# Patient Record
Sex: MALE | Race: WHITE | NOT HISPANIC OR LATINO | Employment: OTHER | URBAN - NONMETROPOLITAN AREA
[De-identification: names, ages, dates, MRNs, and addresses within clinical notes are randomized per-mention and may not be internally consistent; named-entity substitution may affect disease eponyms.]

---

## 2018-07-03 ENCOUNTER — HOSPITAL ENCOUNTER (OUTPATIENT)
Dept: ULTRASOUND IMAGING | Facility: HOSPITAL | Age: 83
Discharge: HOME | End: 2018-07-03
Admitting: NURSE PRACTITIONER

## 2018-07-03 ENCOUNTER — TRANSCRIBE ORDERS (OUTPATIENT)
Dept: GENERAL RADIOLOGY | Facility: HOSPITAL | Age: 83
End: 2018-07-03

## 2018-07-03 DIAGNOSIS — R10.11 RIGHT UPPER QUADRANT PAIN: Primary | ICD-10-CM

## 2018-07-03 DIAGNOSIS — R10.11 RIGHT UPPER QUADRANT PAIN: ICD-10-CM

## 2018-07-03 PROCEDURE — 76705 ECHO EXAM OF ABDOMEN: CPT

## 2018-07-10 ENCOUNTER — TRANSCRIBE ORDERS (OUTPATIENT)
Dept: ADMINISTRATIVE | Facility: HOSPITAL | Age: 83
End: 2018-07-10

## 2018-07-10 ENCOUNTER — TRANSCRIBE ORDERS (OUTPATIENT)
Dept: LAB | Facility: HOSPITAL | Age: 83
End: 2018-07-10

## 2018-07-10 ENCOUNTER — LAB (OUTPATIENT)
Dept: LAB | Facility: HOSPITAL | Age: 83
End: 2018-07-10

## 2018-07-10 DIAGNOSIS — R10.13 EPIGASTRIC PAIN: Primary | ICD-10-CM

## 2018-07-10 DIAGNOSIS — R10.13 EPIGASTRIC PAIN: ICD-10-CM

## 2018-07-10 DIAGNOSIS — R10.13 ABDOMINAL PAIN, EPIGASTRIC: Primary | ICD-10-CM

## 2018-07-10 LAB
ALBUMIN SERPL-MCNC: 4.2 G/DL (ref 3.5–5)
ALBUMIN/GLOB SERPL: 1.2 G/DL (ref 1.1–2.5)
ALP SERPL-CCNC: 37 U/L (ref 24–120)
ALT SERPL W P-5'-P-CCNC: 30 U/L (ref 0–54)
AMYLASE SERPL-CCNC: 85 U/L (ref 30–110)
ANION GAP SERPL CALCULATED.3IONS-SCNC: 10 MMOL/L (ref 4–13)
AST SERPL-CCNC: 34 U/L (ref 7–45)
AUTO MIXED CELLS #: 1.6 10*3/MM3 (ref 0.1–2.6)
AUTO MIXED CELLS %: 16.1 % (ref 0.1–24)
BILIRUB SERPL-MCNC: 0.6 MG/DL (ref 0.1–1)
BILIRUB UR QL STRIP: NEGATIVE
BUN BLD-MCNC: 20 MG/DL (ref 5–21)
BUN/CREAT SERPL: 15
CALCIUM SPEC-SCNC: 9.4 MG/DL (ref 8.4–10.4)
CHLORIDE SERPL-SCNC: 107 MMOL/L (ref 98–110)
CLARITY UR: CLEAR
CO2 SERPL-SCNC: 30 MMOL/L (ref 24–31)
COLOR UR: YELLOW
CREAT BLD-MCNC: 1.33 MG/DL (ref 0.5–1.4)
ERYTHROCYTE [DISTWIDTH] IN BLOOD BY AUTOMATED COUNT: 13.2 % (ref 12–15)
GFR SERPL CREATININE-BSD FRML MDRD: 51 ML/MIN/1.73
GLOBULIN UR ELPH-MCNC: 3.6 GM/DL
GLUCOSE BLD-MCNC: 112 MG/DL (ref 70–100)
GLUCOSE UR STRIP-MCNC: NEGATIVE MG/DL
HCT VFR BLD AUTO: 44.6 % (ref 40–52)
HGB BLD-MCNC: 14.7 G/DL (ref 14–18)
HGB UR QL STRIP.AUTO: NEGATIVE
KETONES UR QL STRIP: NEGATIVE
LEUKOCYTE ESTERASE UR QL STRIP.AUTO: NEGATIVE
LIPASE SERPL-CCNC: 87 U/L (ref 23–203)
LYMPHOCYTES # BLD AUTO: 1.9 10*3/MM3 (ref 0.8–7)
LYMPHOCYTES NFR BLD AUTO: 19 % (ref 15–45)
MCH RBC QN AUTO: 29.5 PG (ref 28–32)
MCHC RBC AUTO-ENTMCNC: 33 G/DL (ref 33–36)
MCV RBC AUTO: 89.6 FL (ref 82–95)
NEUTROPHILS # BLD AUTO: 6.3 10*3/MM3 (ref 1.5–8.3)
NEUTROPHILS NFR BLD AUTO: 64.9 % (ref 39–78)
NITRITE UR QL STRIP: NEGATIVE
PH UR STRIP.AUTO: 6 [PH] (ref 5–8)
PLATELET # BLD AUTO: 337 10*3/MM3 (ref 130–400)
PMV BLD AUTO: 9.1 FL (ref 6–12)
POTASSIUM BLD-SCNC: 5.2 MMOL/L (ref 3.5–5.3)
PROT SERPL-MCNC: 7.8 G/DL (ref 6.3–8.7)
PROT UR QL STRIP: NEGATIVE
RBC # BLD AUTO: 4.98 10*6/MM3 (ref 4.2–5.4)
SODIUM BLD-SCNC: 147 MMOL/L (ref 135–145)
SP GR UR STRIP: 1.01 (ref 1–1.03)
UROBILINOGEN UR QL STRIP: NORMAL
WBC NRBC COR # BLD: 9.8 10*3/MM3 (ref 4.8–10.8)

## 2018-07-10 PROCEDURE — 83690 ASSAY OF LIPASE: CPT

## 2018-07-10 PROCEDURE — 81003 URINALYSIS AUTO W/O SCOPE: CPT

## 2018-07-10 PROCEDURE — 82150 ASSAY OF AMYLASE: CPT

## 2018-07-10 PROCEDURE — 85025 COMPLETE CBC W/AUTO DIFF WBC: CPT

## 2018-07-10 PROCEDURE — 80053 COMPREHEN METABOLIC PANEL: CPT

## 2018-07-10 PROCEDURE — 36415 COLL VENOUS BLD VENIPUNCTURE: CPT

## 2018-07-11 ENCOUNTER — HOSPITAL ENCOUNTER (OUTPATIENT)
Dept: NUCLEAR MEDICINE | Facility: HOSPITAL | Age: 83
Discharge: HOME | End: 2018-07-11

## 2018-07-11 DIAGNOSIS — R10.13 ABDOMINAL PAIN, EPIGASTRIC: ICD-10-CM

## 2018-07-11 PROCEDURE — 78226 HEPATOBILIARY SYSTEM IMAGING: CPT

## 2018-07-11 PROCEDURE — A9537 TC99M MEBROFENIN: HCPCS | Performed by: NURSE PRACTITIONER

## 2018-07-11 PROCEDURE — 0 TECHNETIUM TC 99M MEBROFENIN KIT: Performed by: NURSE PRACTITIONER

## 2018-07-11 RX ORDER — KIT FOR THE PREPARATION OF TECHNETIUM TC 99M MEBROFENIN 45 MG/10ML
1 INJECTION, POWDER, LYOPHILIZED, FOR SOLUTION INTRAVENOUS
Status: COMPLETED | OUTPATIENT
Start: 2018-07-11 | End: 2018-07-11

## 2018-07-11 RX ADMIN — MEBROFENIN 1 DOSE: 45 INJECTION, POWDER, LYOPHILIZED, FOR SOLUTION INTRAVENOUS at 15:30

## 2018-07-25 ENCOUNTER — TRANSCRIBE ORDERS (OUTPATIENT)
Dept: ADMINISTRATIVE | Facility: HOSPITAL | Age: 83
End: 2018-07-25

## 2018-07-25 ENCOUNTER — HOSPITAL ENCOUNTER (OUTPATIENT)
Dept: CT IMAGING | Facility: HOSPITAL | Age: 83
Discharge: HOME | End: 2018-07-25
Admitting: NURSE PRACTITIONER

## 2018-07-25 DIAGNOSIS — R10.10 UPPER ABDOMINAL PAIN: Primary | ICD-10-CM

## 2018-07-25 DIAGNOSIS — R10.10 UPPER ABDOMINAL PAIN: ICD-10-CM

## 2018-07-25 PROCEDURE — 74176 CT ABD & PELVIS W/O CONTRAST: CPT

## 2018-07-27 ENCOUNTER — TRANSCRIBE ORDERS (OUTPATIENT)
Dept: ADMINISTRATIVE | Facility: HOSPITAL | Age: 83
End: 2018-07-27

## 2018-07-27 DIAGNOSIS — I70.0 ATHEROSCLEROSIS OF AORTA (HCC): Primary | ICD-10-CM

## 2018-07-28 ENCOUNTER — APPOINTMENT (OUTPATIENT)
Dept: GENERAL RADIOLOGY | Facility: HOSPITAL | Age: 83
End: 2018-07-28

## 2018-07-30 ENCOUNTER — HOSPITAL ENCOUNTER (OUTPATIENT)
Dept: CT IMAGING | Facility: HOSPITAL | Age: 83
Discharge: HOME | End: 2018-07-30
Admitting: NURSE PRACTITIONER

## 2018-07-30 DIAGNOSIS — I70.0 ATHEROSCLEROSIS OF AORTA (HCC): ICD-10-CM

## 2018-07-30 LAB — CREAT BLDA-MCNC: 1.2 MG/DL (ref 0.6–1.3)

## 2018-07-30 PROCEDURE — 0 IOPAMIDOL PER 1 ML: Performed by: NURSE PRACTITIONER

## 2018-07-30 PROCEDURE — 82565 ASSAY OF CREATININE: CPT

## 2018-07-30 PROCEDURE — 74174 CTA ABD&PLVS W/CONTRAST: CPT

## 2018-07-30 RX ADMIN — IOPAMIDOL 150 ML: 755 INJECTION, SOLUTION INTRAVENOUS at 08:59

## 2018-07-31 ENCOUNTER — TRANSCRIBE ORDERS (OUTPATIENT)
Dept: ADMINISTRATIVE | Facility: HOSPITAL | Age: 83
End: 2018-07-31

## 2018-07-31 DIAGNOSIS — Z01.818 PREOPERATIVE CLEARANCE: ICD-10-CM

## 2018-07-31 DIAGNOSIS — R07.9 CHEST PAIN, UNSPECIFIED TYPE: ICD-10-CM

## 2018-07-31 DIAGNOSIS — I70.1 ATHEROSCLEROSIS OF RENAL ARTERY (HCC): Primary | ICD-10-CM

## 2018-07-31 DIAGNOSIS — R10.9 GASTRIC PAIN: ICD-10-CM

## 2018-08-09 ENCOUNTER — HOSPITAL ENCOUNTER (OUTPATIENT)
Dept: CARDIOLOGY | Facility: HOSPITAL | Age: 83
Discharge: HOME | End: 2018-08-09
Attending: SPECIALIST | Admitting: SPECIALIST

## 2018-08-09 ENCOUNTER — HOSPITAL ENCOUNTER (OUTPATIENT)
Dept: ULTRASOUND IMAGING | Facility: HOSPITAL | Age: 83
Discharge: HOME | End: 2018-08-09
Attending: SPECIALIST

## 2018-08-09 VITALS
SYSTOLIC BLOOD PRESSURE: 141 MMHG | WEIGHT: 175 LBS | DIASTOLIC BLOOD PRESSURE: 99 MMHG | BODY MASS INDEX: 26.52 KG/M2 | HEIGHT: 68 IN | HEART RATE: 69 BPM

## 2018-08-09 DIAGNOSIS — Z01.818 PREOPERATIVE CLEARANCE: ICD-10-CM

## 2018-08-09 DIAGNOSIS — R10.9 GASTRIC PAIN: ICD-10-CM

## 2018-08-09 DIAGNOSIS — R07.9 CHEST PAIN, UNSPECIFIED TYPE: ICD-10-CM

## 2018-08-09 DIAGNOSIS — I70.1 ATHEROSCLEROSIS OF RENAL ARTERY (HCC): ICD-10-CM

## 2018-08-09 PROCEDURE — 93975 VASCULAR STUDY: CPT

## 2018-08-09 PROCEDURE — 76775 US EXAM ABDO BACK WALL LIM: CPT

## 2018-08-09 PROCEDURE — 93018 CV STRESS TEST I&R ONLY: CPT | Performed by: INTERNAL MEDICINE

## 2018-08-09 PROCEDURE — 93017 CV STRESS TEST TRACING ONLY: CPT

## 2018-08-10 LAB
BH CV STRESS BP STAGE 1: NORMAL
BH CV STRESS BP STAGE 2: NORMAL
BH CV STRESS BP STAGE 3: NORMAL
BH CV STRESS DURATION MIN STAGE 1: 3
BH CV STRESS DURATION MIN STAGE 2: 3
BH CV STRESS DURATION MIN STAGE 3: 3
BH CV STRESS DURATION SEC STAGE 1: 0
BH CV STRESS DURATION SEC STAGE 2: 0
BH CV STRESS DURATION SEC STAGE 3: 0
BH CV STRESS GRADE STAGE 1: 0
BH CV STRESS GRADE STAGE 2: 5
BH CV STRESS GRADE STAGE 3: 10
BH CV STRESS HR STAGE 1: 89
BH CV STRESS HR STAGE 2: 100
BH CV STRESS HR STAGE 3: 114
BH CV STRESS METS STAGE 1: 2.3
BH CV STRESS METS STAGE 2: 3.5
BH CV STRESS METS STAGE 3: 4.6
BH CV STRESS PROTOCOL 1: NORMAL
BH CV STRESS RECOVERY BP: NORMAL MMHG
BH CV STRESS RECOVERY HR: 80 BPM
BH CV STRESS SPEED STAGE 1: 1.7
BH CV STRESS SPEED STAGE 2: 1.7
BH CV STRESS SPEED STAGE 3: 1.7
BH CV STRESS STAGE 1: 1
BH CV STRESS STAGE 2: 2
BH CV STRESS STAGE 3: 3
MAXIMAL PREDICTED HEART RATE: 135 BPM
PERCENT MAX PREDICTED HR: 84.44 %
STRESS BASELINE BP: NORMAL MMHG
STRESS BASELINE HR: 85 BPM
STRESS PERCENT HR: 99 %
STRESS POST ESTIMATED WORKLOAD: 4.6 METS
STRESS POST EXERCISE DUR MIN: 9 MIN
STRESS POST EXERCISE DUR SEC: 0 SEC
STRESS POST PEAK BP: NORMAL MMHG
STRESS POST PEAK HR: 114 BPM
STRESS TARGET HR: 115 BPM

## 2018-08-21 NOTE — PROGRESS NOTES
2018      Jose David Pimentel MD  [address] [facility] [place] [address]    Kj Cerrato  1932    Chief Complaint   Patient presents with   • Peripheral Vascular Disease     New referral.History of renal artery stenosis and carotid disease.Denies new stroke symptoms.Residual speech problem from old CVA.Has not followed up with vascular surgeon in recent years.       Dear Jose David Pimentel MD:      HPI  I had the pleasure of seeing your patient Kj Cerrato in the office today.  Thank you kindly for this consultation.  As you recall, Kj Cerrato is a 85 y.o.  male who you are currently following for routine health maintenance.  He is having complaints of epigastric pain that is only relieved when he lays flat.  He did have a CTA of the abdomen and pelvis which I did review in office.  He was referred here for aortic calcifications and stenosis.   He states the pain has resolved.  He denies any uncontrolled high blood pressure.  He denies any claudication to his lower extremities.  He does have a history of stroke that has left him with speech difficulty.  He had a right carotid endarterectomy in .  He did have noninvasive testing which I did review in office.     Past Medical History:   Diagnosis Date   • Atherosclerosis    • BPH (benign prostatic hyperplasia)    • Carotid artery disease (CMS/HCC)    • High cholesterol    • Hypertension    • Kidney cysts    • Kidney stone    • Renal artery stenosis (CMS/HCC)    • Renal artery stenosis (CMS/HCC) 2018   • Renal insufficiency    • Solitary lung nodule    • Stroke (CMS/HCC)     Residual speech problems.       Past Surgical History:   Procedure Laterality Date   • CAROTID ENDARTERECTOMY Right     Per Dr Idris Dotson   • PROSTATE SURGERY         Family History   Problem Relation Age of Onset   • Other Mother          after cranial surgery   • Cancer Father        Social History     Social History   • Marital status:      Spouse name: N/A   •  "Number of children: N/A   • Years of education: N/A     Occupational History   • Not on file.     Social History Main Topics   • Smoking status: Former Smoker     Quit date: 1970   • Smokeless tobacco: Never Used   • Alcohol use No   • Drug use: No   • Sexual activity: Defer     Other Topics Concern   • Not on file     Social History Narrative   • No narrative on file       No Known Allergies    Prior to Admission medications    Medication Sig Start Date End Date Taking? Authorizing Provider   aspirin (ASPIR) 81 MG EC tablet Aspir-81    Mayra Gonzalez MD   atenolol (TENORMIN) 50 MG tablet atenolol 50 mg tablet   Take 1 tablet every day by oral route.    Mayra Gonzalez MD   clopidogrel (PLAVIX) 75 MG tablet clopidogrel 75 mg tablet   Take 1 tablet every day by oral route.    Mayra Gonzalez MD   finasteride (PROSCAR) 5 MG tablet finasteride 5 mg tablet   Take 1 tablet every day by oral route.    Mayra Gonzalez MD   pantoprazole (PROTONIX) 40 MG EC tablet Protonix 40 mg tablet,delayed release   Take 1 tablet every day by oral route at bedtime.    Mayra Gonzalez MD   pravastatin (PRAVACHOL) 40 MG tablet pravastatin 40 mg tablet   Take 1 tablet every day by oral route.    Mayra Gonzalez MD       Review of Systems   Constitutional: Negative.    HENT: Negative.    Eyes: Negative.    Respiratory: Negative.    Cardiovascular: Negative.    Gastrointestinal: Negative.    Endocrine: Negative.    Genitourinary: Negative.    Musculoskeletal: Negative.    Skin: Negative.    Allergic/Immunologic: Negative.    Neurological: Negative.    Hematological: Negative.    Psychiatric/Behavioral: Negative.    All other systems reviewed and are negative.      /82   Pulse 61   Ht 172.7 cm (68\")   Wt 80.7 kg (178 lb)   BMI 27.06 kg/m²   Physical Exam   Constitutional: He is oriented to person, place, and time. He appears well-developed and well-nourished.   HENT:   Head: Normocephalic and " atraumatic.   Eyes: Pupils are equal, round, and reactive to light. No scleral icterus.   Neck: Normal range of motion. Neck supple. No thyromegaly present.   Cardiovascular: Normal rate, regular rhythm, normal heart sounds and intact distal pulses.    Pulmonary/Chest: Effort normal and breath sounds normal.   Abdominal: Soft. Bowel sounds are normal.   Musculoskeletal: Normal range of motion.   Neurological: He is alert and oriented to person, place, and time.   Skin: Skin is warm and dry.   Psychiatric: He has a normal mood and affect. His behavior is normal. Judgment and thought content normal.   Nursing note and vitals reviewed.      Ct Abdomen Pelvis Without Contrast    Result Date: 7/25/2018  Narrative: EXAMINATION:  CT ABDOMEN WO CONTRAST-  7/25/2018 9:53 AM CDT  HISTORY: R10.10-Upper abdominal pain, unspecified  TECHNIQUE: Spiral CT was performed of the abdomen without contrast. Multiplanar images were reconstructed.  DLP: 427 mGy-cm. Automated dosage control was utilized.  COMPARISON: No comparison study.  LUNG BASES: Minimal dependent atelectasis. 3-4 mm subpleural left lower lobe nodule on images 5 and 6.  LIVER AND SPLEEN: Normal unenhanced appearance of the liver. Normal unenhanced appearance of the spleen. No dense gallstones.  PANCREAS: Normal unenhanced appearance of the pancreas.  KIDNEYS AND ADRENALS: The adrenal glands are normal. There are multiple corticomedullary junction renal stones bilaterally. There is a 5 mm hyperdense area in the midpole region of the left kidney anteriorly. This could be a hyperdense cyst. There is a 9 mm low-density lesion in the mid to lower pole left kidney posteriorly that is probably a small cyst. These lesions are not fully characterized without contrast. The renal collecting systems and visualized ureters are nondilated.  BOWEL: Moderate stool in the visualized colon. Visualized small bowel loops are normal caliber. The stomach is unremarkable.  OTHER: There is  dense atheromatous disease of the aortoiliac vessels. There is dense calcification at the origin of both renal arteries. Renal artery stenosis is not excluded. There is also plaque at the origins of the mesenteric vessels.      Impression: 1. There is a 3-4 mm subpleural left lower lobe nodule and images 5 and 6 of series 3. This is likely a small granuloma. Small neoplasm is felt to be much less likely. 2. Multiple corticomedullary junction renal stones bilaterally. Currently no evidence of any obstructive uropathy. 3. There is a 5 mm hyperdense lesion in the midpole left kidney anteriorly. This is probably a hyperdense cyst. There is a 9 mm low-density lesion in the mid to lower pole left kidney posteriorly, likely a small cyst. These lesions are not fully characterized without contrast. 4. Dense atheromatous disease of the aortoiliac vessels with dense calcification at the origins of both renal arteries. Renal artery stenosis cannot be ruled out. There is lesser calcified plaque at the origins of the mesenteric vessels. This report was finalized on 07/25/2018 10:17 by Dr. Sid Francois MD.    Ct Angiogram Abdomen Pelvis With & Without Contrast    Result Date: 7/30/2018  Narrative: EXAM: CT ANGIOGRAM ABDOMEN PELVIS W WO CONTRAST- - 7/30/2018 8:16 AM CDT  HISTORY: ATHEROSCLEROSIS OF AORTA; I70.0-Atherosclerosis of aorta   COMPARISON: 7/25/2018.  DOSE LENGTH PRODUCT: 229 mGy cm. Automated exposure control was also utilized to decrease patient radiation dose.  TECHNIQUE: Enhanced  axial images of the abdomen  obtained after administration of 150 ml of Isovue-370. No reported adverse events. Coronal and sagittal reformats available. 3D postprocessing, including MIPs, performed and images saved to PACS.  FINDINGS: VISUALIZED CHEST: No pleural or pericardial effusion. The previously described left lower lobe 5 mm pulmonary nodule appears hyperdense on this exam, likely calcified granuloma.  Phase of contrast limits  evaluation of solid organs. LIVER: No arterially enhancing liver lesion.  BILIARY: No calcified gallstone. No intrahepatic or extrahepatic bile duct dilation.  PANCREAS: Normal pancreas contour and enhancement.  SPLEEN: Normal size and contour.  ADRENAL: Normal appearance of the bilateral adrenal glands.  GENITOURINARY: No hydronephrosis. Limited exam of renal parenchyma due to arterial phase of imaging. The previously described left subcentimeter renal lesions are actually incompletely evaluated on today's exam due to phase of contrast. Renal vascular calcifications could represent vascular calcifications are urolithiasis. Urinary bladder is within normal limits. Normal size prostate.  PERITONEUM: No free air or ascites.  GI TRACT: Normal configuration of the stomach and duodenum.  No abnormally dilated loops of bowel or bowel wall thickening.  Colonic diverticula. Normal appendix on axial images 139-127.  VESSELS: Aorta at diaphragm: 2.5 cm Abdominal aorta at celiac axis origin: 2.6 cm Abdominal aorta at the renal arteries: 2.2 cm Infrarenal abdominal aorta: 2.6 cm  Aorta with calcified and noncalcified atherosclerosis. Celiac, superior mesenteric, bilateral single renal inferior mesenteric arteries are patent. Mild-to-moderate stenosis at the celiac origin. Mild to moderate right renal artery stenosis for a length of 1.7 cm. Moderate to severe left renal artery stenosis for a length of 1.6 cm.  RETROPERITONEUM: No mass, lymphadenopathy or hemorrhage.  BONES: No acute or aggressive bony lesion.  SOFT TISSUES: Normal appearance of the overlying abdominal soft tissues.        Impression: 1. Aorta with calcified and noncalcified atherosclerosis. Infrarenal aorta measures up to 2.6 cm. 2. Moderate to severe left renal artery stenosis for a length of 1.6 cm. Mild to moderate right renal artery and celiac stenosis. 3. Bilateral renal calcifications, may represent a combination of nephrolithiasis and renal vascular  calcifications. 4. Incompletely evaluated subcentimeter left renal lesions due to phase of contrast.  This examination was initially reviewed and dictated by Dr. Bella Sampson.  Exam additionally reviewed by Dr. Heath Webber. Agree with the stated findings and impression. This report was finalized on 07/30/2018 11:26 by Dr Heath Webber, .    Us Renal Artery Complete    Result Date: 8/9/2018  Narrative: History: 85-year-old with left renal artery stenosis.  Reference: CTA abdomen/pelvis July 30, 2018.  FINDINGS: Grayscale imaging of both kidneys and duplex imaging of the renal arteries.  Incidental fatty liver. Both kidneys normal in size, contour, and cortical echogenicity. Few tiny renal cysts. Nephrolithiasis better demonstrated by CT. No solid renal mass or obstructive uropathy.  PSV of the abdominal aorta at the level of the renal artery origins is 80 cm/s. There is increased PSV of the proximal left renal artery at 186 cm/s (renal artery/abdominal aorta velocity ratio 2.4). This underestimates the known moderate/severe proximal left renal artery stenosis on recent CTA.  Proximal right renal artery  cm/s. Distal PSV in the main right renal artery 53 cm/s.  Mildly elevated resistive indices of the main renal artery origins, 0.8       Impression: This exam underestimates the moderate/severe left renal artery stenosis on recent CTA. This report was finalized on 08/09/2018 09:12 by Dr Maykel Chan, .    Us Renal Bilateral    Result Date: 8/9/2018  Narrative: History: 85-year-old with left renal artery stenosis.  Reference: CTA abdomen/pelvis July 30, 2018.  FINDINGS: Grayscale imaging of both kidneys and duplex imaging of the renal arteries.  Incidental fatty liver. Both kidneys normal in size, contour, and cortical echogenicity. Few tiny renal cysts. Nephrolithiasis better demonstrated by CT. No solid renal mass or obstructive uropathy.  PSV of the abdominal aorta at the level of the renal artery origins is  80 cm/s. There is increased PSV of the proximal left renal artery at 186 cm/s (renal artery/abdominal aorta velocity ratio 2.4). This underestimates the known moderate/severe proximal left renal artery stenosis on recent CTA.  Proximal right renal artery  cm/s. Distal PSV in the main right renal artery 53 cm/s.  Mildly elevated resistive indices of the main renal artery origins, 0.8       Impression: This exam underestimates the moderate/severe left renal artery stenosis on recent CTA. This report was finalized on 08/09/2018 09:12 by Dr Maykel Chan, .      Patient Active Problem List   Diagnosis   • Renal artery stenosis (CMS/HCC)   • Aortoiliac occlusive disease (CMS/HCC)   • Bilateral carotid artery stenosis         ICD-10-CM ICD-9-CM   1. Aortoiliac occlusive disease (CMS/HCC) I74.09 444.09   2. Bilateral carotid artery stenosis I65.23 433.10     433.30   3. Renal artery stenosis (CMS/HCC) I70.1 440.1       Lab Frequency Next Occurrence   NM HIDA Scan With Pharmacological Intervention Once 07/15/2018       Plan: After thoroughly evaluating Kj Cerrato, I believe the best course of action is to remain conservative from a vascular surgery standpoint.  I carefully reviewed the CTA of his abdomen and pelvis which does show aortoiliac occlusive disease but this does not require any intervention at this time.  He does have evidence of renal artery stenosis but they are not preocclusive and he does not have severely uncontrolled hypertension.  I will see him back in 6 months time with a repeat carotid duplex for continued surveillance.  He does have a history of carotid surgery in the past.  The patient can continue taking his current medication regimen as previously planned.  This was all discussed in full with complete understanding.    Thank you for allowing me to participate in the care of your patient.  Please do not hesitate with any questions or concerns.  I will keep you aware of any further encounters with  Kj Cerrato.        Sincerely yours,         Dexter Cross, Suleman Almonte MD

## 2018-08-22 ENCOUNTER — TELEPHONE (OUTPATIENT)
Dept: VASCULAR SURGERY | Facility: CLINIC | Age: 83
End: 2018-08-22

## 2018-08-22 NOTE — TELEPHONE ENCOUNTER
Spoke with Mr Cerrato reminding him of his appointment for Thursday, August 23rd, 2018 at 10 am with Dr Cross. Mr Cerrato confirmed he would be here.

## 2018-08-23 ENCOUNTER — OFFICE VISIT (OUTPATIENT)
Dept: VASCULAR SURGERY | Facility: CLINIC | Age: 83
End: 2018-08-23

## 2018-08-23 VITALS
HEIGHT: 68 IN | DIASTOLIC BLOOD PRESSURE: 82 MMHG | HEART RATE: 61 BPM | WEIGHT: 178 LBS | SYSTOLIC BLOOD PRESSURE: 144 MMHG | BODY MASS INDEX: 26.98 KG/M2

## 2018-08-23 DIAGNOSIS — I74.09 AORTOILIAC OCCLUSIVE DISEASE (HCC): Primary | ICD-10-CM

## 2018-08-23 DIAGNOSIS — I65.23 BILATERAL CAROTID ARTERY STENOSIS: ICD-10-CM

## 2018-08-23 DIAGNOSIS — I70.1 RENAL ARTERY STENOSIS (HCC): ICD-10-CM

## 2018-08-23 PROCEDURE — 99204 OFFICE O/P NEW MOD 45 MIN: CPT | Performed by: SURGERY

## 2018-08-23 RX ORDER — LANOLIN ALCOHOL/MO/W.PET/CERES
1000 CREAM (GRAM) TOPICAL DAILY
Status: ON HOLD | COMMUNITY
End: 2022-02-10

## 2018-08-23 RX ORDER — MELATONIN
1000 DAILY
Status: ON HOLD | COMMUNITY
End: 2022-02-10

## 2018-08-23 RX ORDER — GABAPENTIN 300 MG/1
300 CAPSULE ORAL
Refills: 3 | COMMUNITY
Start: 2018-07-13

## 2018-09-24 ENCOUNTER — TRANSCRIBE ORDERS (OUTPATIENT)
Dept: ADMINISTRATIVE | Facility: HOSPITAL | Age: 83
End: 2018-09-24

## 2018-09-24 ENCOUNTER — APPOINTMENT (OUTPATIENT)
Dept: LAB | Facility: HOSPITAL | Age: 83
End: 2018-09-24
Attending: INTERNAL MEDICINE

## 2018-09-24 DIAGNOSIS — N18.30 CHRONIC KIDNEY DISEASE, STAGE III (MODERATE) (HCC): Primary | ICD-10-CM

## 2018-09-24 LAB
ALBUMIN SERPL-MCNC: 4.2 G/DL (ref 3.5–5)
ANION GAP SERPL CALCULATED.3IONS-SCNC: 12 MMOL/L (ref 4–13)
BACTERIA UR QL AUTO: ABNORMAL /HPF
BILIRUB UR QL STRIP: NEGATIVE
BUN BLD-MCNC: 16 MG/DL (ref 5–21)
BUN/CREAT SERPL: 14.4 (ref 7–25)
CALCIUM SPEC-SCNC: 9.3 MG/DL (ref 8.4–10.4)
CHLORIDE SERPL-SCNC: 104 MMOL/L (ref 98–110)
CLARITY UR: CLEAR
CO2 SERPL-SCNC: 28 MMOL/L (ref 24–31)
COLOR UR: YELLOW
CREAT BLD-MCNC: 1.11 MG/DL (ref 0.5–1.4)
CREAT UR-MCNC: 110.2 MG/DL
DEPRECATED RDW RBC AUTO: 44 FL (ref 40–54)
ERYTHROCYTE [DISTWIDTH] IN BLOOD BY AUTOMATED COUNT: 13.2 % (ref 12–15)
GFR SERPL CREATININE-BSD FRML MDRD: 63 ML/MIN/1.73
GLUCOSE BLD-MCNC: 126 MG/DL (ref 70–100)
GLUCOSE UR STRIP-MCNC: NEGATIVE MG/DL
HCT VFR BLD AUTO: 44.7 % (ref 40–52)
HGB BLD-MCNC: 14.4 G/DL (ref 14–18)
HGB UR QL STRIP.AUTO: NEGATIVE
HYALINE CASTS UR QL AUTO: ABNORMAL /LPF
KETONES UR QL STRIP: NEGATIVE
LEUKOCYTE ESTERASE UR QL STRIP.AUTO: ABNORMAL
MCH RBC QN AUTO: 29.6 PG (ref 28–32)
MCHC RBC AUTO-ENTMCNC: 32.2 G/DL (ref 33–36)
MCV RBC AUTO: 91.8 FL (ref 82–95)
NITRITE UR QL STRIP: NEGATIVE
PH UR STRIP.AUTO: 6 [PH] (ref 5–8)
PHOSPHATE SERPL-MCNC: 3.4 MG/DL (ref 2.5–4.5)
PLATELET # BLD AUTO: 332 10*3/MM3 (ref 130–400)
PMV BLD AUTO: 10 FL (ref 6–12)
POTASSIUM BLD-SCNC: 4.5 MMOL/L (ref 3.5–5.3)
PROT UR QL STRIP: NEGATIVE
PROT UR-MCNC: 8 MG/DL (ref 0–13.5)
PROT/CREAT UR: 72.6 MG/G CREA
PTH-INTACT SERPL-MCNC: 42.2 PG/ML (ref 7.5–53.5)
RBC # BLD AUTO: 4.87 10*6/MM3 (ref 4.8–5.9)
RBC # UR: ABNORMAL /HPF
REF LAB TEST METHOD: ABNORMAL
SODIUM BLD-SCNC: 144 MMOL/L (ref 135–145)
SP GR UR STRIP: 1.02 (ref 1–1.03)
SQUAMOUS #/AREA URNS HPF: ABNORMAL /HPF
URATE SERPL-MCNC: 7.6 MG/DL (ref 3.5–8.5)
UROBILINOGEN UR QL STRIP: ABNORMAL
WBC NRBC COR # BLD: 7.36 10*3/MM3 (ref 4.8–10.8)
WBC UR QL AUTO: ABNORMAL /HPF

## 2018-09-24 PROCEDURE — 84155 ASSAY OF PROTEIN SERUM: CPT | Performed by: INTERNAL MEDICINE

## 2018-09-24 PROCEDURE — 81001 URINALYSIS AUTO W/SCOPE: CPT | Performed by: INTERNAL MEDICINE

## 2018-09-24 PROCEDURE — 82570 ASSAY OF URINE CREATININE: CPT | Performed by: INTERNAL MEDICINE

## 2018-09-24 PROCEDURE — 36415 COLL VENOUS BLD VENIPUNCTURE: CPT | Performed by: INTERNAL MEDICINE

## 2018-09-24 PROCEDURE — 84156 ASSAY OF PROTEIN URINE: CPT | Performed by: INTERNAL MEDICINE

## 2018-09-24 PROCEDURE — 84165 PROTEIN E-PHORESIS SERUM: CPT | Performed by: INTERNAL MEDICINE

## 2018-09-24 PROCEDURE — 85027 COMPLETE CBC AUTOMATED: CPT | Performed by: INTERNAL MEDICINE

## 2018-09-24 PROCEDURE — 83970 ASSAY OF PARATHORMONE: CPT | Performed by: INTERNAL MEDICINE

## 2018-09-24 PROCEDURE — 84550 ASSAY OF BLOOD/URIC ACID: CPT | Performed by: INTERNAL MEDICINE

## 2018-09-24 PROCEDURE — 80069 RENAL FUNCTION PANEL: CPT | Performed by: INTERNAL MEDICINE

## 2018-09-25 LAB
ALBUMIN SERPL-MCNC: 3.5 G/DL (ref 2.9–4.4)
ALBUMIN/GLOB SERPL: 1.1 {RATIO} (ref 0.7–1.7)
ALPHA1 GLOB FLD ELPH-MCNC: 0.2 G/DL (ref 0–0.4)
ALPHA2 GLOB SERPL ELPH-MCNC: 0.8 G/DL (ref 0.4–1)
B-GLOBULIN SERPL ELPH-MCNC: 1.1 G/DL (ref 0.7–1.3)
GAMMA GLOB SERPL ELPH-MCNC: 1.2 G/DL (ref 0.4–1.8)
GLOBULIN SER CALC-MCNC: 3.3 G/DL (ref 2.2–3.9)
Lab: NORMAL
M-SPIKE: NORMAL G/DL
PROT PATTERN SERPL ELPH-IMP: NORMAL
PROT SERPL-MCNC: 6.8 G/DL (ref 6–8.5)

## 2022-02-08 ENCOUNTER — ANESTHESIA (OUTPATIENT)
Dept: PERIOP | Facility: HOSPITAL | Age: 87
End: 2022-02-08

## 2022-02-08 ENCOUNTER — ANESTHESIA EVENT (OUTPATIENT)
Dept: PERIOP | Facility: HOSPITAL | Age: 87
End: 2022-02-08

## 2022-02-08 ENCOUNTER — HOSPITAL ENCOUNTER (INPATIENT)
Facility: HOSPITAL | Age: 87
LOS: 8 days | Discharge: HOME | End: 2022-02-16
Attending: STUDENT IN AN ORGANIZED HEALTH CARE EDUCATION/TRAINING PROGRAM | Admitting: SPECIALIST

## 2022-02-08 ENCOUNTER — APPOINTMENT (OUTPATIENT)
Dept: CT IMAGING | Facility: HOSPITAL | Age: 87
End: 2022-02-08

## 2022-02-08 DIAGNOSIS — R13.10 DYSPHAGIA, UNSPECIFIED TYPE: ICD-10-CM

## 2022-02-08 DIAGNOSIS — K35.80 ACUTE APPENDICITIS, UNSPECIFIED ACUTE APPENDICITIS TYPE: Primary | ICD-10-CM

## 2022-02-08 DIAGNOSIS — K37 APPENDICITIS: ICD-10-CM

## 2022-02-08 DIAGNOSIS — Z74.09 IMPAIRED MOBILITY: ICD-10-CM

## 2022-02-08 LAB
ALBUMIN SERPL-MCNC: 4.3 G/DL (ref 3.5–5.2)
ALBUMIN/GLOB SERPL: 1.2 G/DL
ALP SERPL-CCNC: 39 U/L (ref 39–117)
ALT SERPL W P-5'-P-CCNC: 13 U/L (ref 1–41)
ANION GAP SERPL CALCULATED.3IONS-SCNC: 16 MMOL/L (ref 5–15)
AST SERPL-CCNC: 15 U/L (ref 1–40)
BASOPHILS # BLD AUTO: 0.04 10*3/MM3 (ref 0–0.2)
BASOPHILS NFR BLD AUTO: 0.2 % (ref 0–1.5)
BILIRUB SERPL-MCNC: 1.6 MG/DL (ref 0–1.2)
BUN SERPL-MCNC: 33 MG/DL (ref 8–23)
BUN/CREAT SERPL: 15.1 (ref 7–25)
CALCIUM SPEC-SCNC: 9.8 MG/DL (ref 8.6–10.5)
CHLORIDE SERPL-SCNC: 104 MMOL/L (ref 98–107)
CO2 SERPL-SCNC: 22 MMOL/L (ref 22–29)
CREAT SERPL-MCNC: 2.19 MG/DL (ref 0.76–1.27)
D-LACTATE SERPL-SCNC: 3.2 MMOL/L (ref 0.5–2)
DEPRECATED RDW RBC AUTO: 43.9 FL (ref 37–54)
EOSINOPHIL # BLD AUTO: 0 10*3/MM3 (ref 0–0.4)
EOSINOPHIL NFR BLD AUTO: 0 % (ref 0.3–6.2)
ERYTHROCYTE [DISTWIDTH] IN BLOOD BY AUTOMATED COUNT: 13.5 % (ref 12.3–15.4)
GFR SERPL CREATININE-BSD FRML MDRD: 28 ML/MIN/1.73
GLOBULIN UR ELPH-MCNC: 3.5 GM/DL
GLUCOSE SERPL-MCNC: 135 MG/DL (ref 65–99)
HCT VFR BLD AUTO: 46 % (ref 37.5–51)
HGB BLD-MCNC: 14.4 G/DL (ref 13–17.7)
IMM GRANULOCYTES # BLD AUTO: 0.1 10*3/MM3 (ref 0–0.05)
IMM GRANULOCYTES NFR BLD AUTO: 0.5 % (ref 0–0.5)
LIPASE SERPL-CCNC: 15 U/L (ref 13–60)
LYMPHOCYTES # BLD AUTO: 0.81 10*3/MM3 (ref 0.7–3.1)
LYMPHOCYTES NFR BLD AUTO: 4.3 % (ref 19.6–45.3)
MCH RBC QN AUTO: 27.8 PG (ref 26.6–33)
MCHC RBC AUTO-ENTMCNC: 31.3 G/DL (ref 31.5–35.7)
MCV RBC AUTO: 88.8 FL (ref 79–97)
MONOCYTES # BLD AUTO: 1.35 10*3/MM3 (ref 0.1–0.9)
MONOCYTES NFR BLD AUTO: 7.1 % (ref 5–12)
NEUTROPHILS NFR BLD AUTO: 16.74 10*3/MM3 (ref 1.7–7)
NEUTROPHILS NFR BLD AUTO: 87.9 % (ref 42.7–76)
NRBC BLD AUTO-RTO: 0 /100 WBC (ref 0–0.2)
PLATELET # BLD AUTO: 384 10*3/MM3 (ref 140–450)
PMV BLD AUTO: 9.9 FL (ref 6–12)
POTASSIUM SERPL-SCNC: 4.7 MMOL/L (ref 3.5–5.2)
PROT SERPL-MCNC: 7.8 G/DL (ref 6–8.5)
RBC # BLD AUTO: 5.18 10*6/MM3 (ref 4.14–5.8)
SARS-COV-2 RNA PNL SPEC NAA+PROBE: NOT DETECTED
SODIUM SERPL-SCNC: 142 MMOL/L (ref 136–145)
WBC NRBC COR # BLD: 19.04 10*3/MM3 (ref 3.4–10.8)

## 2022-02-08 PROCEDURE — 99284 EMERGENCY DEPT VISIT MOD MDM: CPT

## 2022-02-08 PROCEDURE — 85025 COMPLETE CBC W/AUTO DIFF WBC: CPT | Performed by: STUDENT IN AN ORGANIZED HEALTH CARE EDUCATION/TRAINING PROGRAM

## 2022-02-08 PROCEDURE — 93005 ELECTROCARDIOGRAM TRACING: CPT | Performed by: STUDENT IN AN ORGANIZED HEALTH CARE EDUCATION/TRAINING PROGRAM

## 2022-02-08 PROCEDURE — C1889 IMPLANT/INSERT DEVICE, NOC: HCPCS | Performed by: SPECIALIST

## 2022-02-08 PROCEDURE — 93010 ELECTROCARDIOGRAM REPORT: CPT | Performed by: INTERNAL MEDICINE

## 2022-02-08 PROCEDURE — 25010000002 ALBUMIN HUMAN 5% PER 50 ML: Performed by: NURSE ANESTHETIST, CERTIFIED REGISTERED

## 2022-02-08 PROCEDURE — 25010000002 ONDANSETRON PER 1 MG: Performed by: STUDENT IN AN ORGANIZED HEALTH CARE EDUCATION/TRAINING PROGRAM

## 2022-02-08 PROCEDURE — 87635 SARS-COV-2 COVID-19 AMP PRB: CPT | Performed by: STUDENT IN AN ORGANIZED HEALTH CARE EDUCATION/TRAINING PROGRAM

## 2022-02-08 PROCEDURE — 0 HYDROMORPHONE 1 MG/ML SOLUTION: Performed by: STUDENT IN AN ORGANIZED HEALTH CARE EDUCATION/TRAINING PROGRAM

## 2022-02-08 PROCEDURE — 25010000002 PROPOFOL 10 MG/ML EMULSION: Performed by: NURSE ANESTHETIST, CERTIFIED REGISTERED

## 2022-02-08 PROCEDURE — 25010000002 PIPERACILLIN SOD-TAZOBACTAM PER 1 G: Performed by: STUDENT IN AN ORGANIZED HEALTH CARE EDUCATION/TRAINING PROGRAM

## 2022-02-08 PROCEDURE — 88304 TISSUE EXAM BY PATHOLOGIST: CPT | Performed by: SPECIALIST

## 2022-02-08 PROCEDURE — 80053 COMPREHEN METABOLIC PANEL: CPT | Performed by: STUDENT IN AN ORGANIZED HEALTH CARE EDUCATION/TRAINING PROGRAM

## 2022-02-08 PROCEDURE — 0DTJ4ZZ RESECTION OF APPENDIX, PERCUTANEOUS ENDOSCOPIC APPROACH: ICD-10-PCS | Performed by: SPECIALIST

## 2022-02-08 PROCEDURE — P9041 ALBUMIN (HUMAN),5%, 50ML: HCPCS | Performed by: NURSE ANESTHETIST, CERTIFIED REGISTERED

## 2022-02-08 PROCEDURE — 36415 COLL VENOUS BLD VENIPUNCTURE: CPT

## 2022-02-08 PROCEDURE — 25010000002 ONDANSETRON PER 1 MG: Performed by: NURSE ANESTHETIST, CERTIFIED REGISTERED

## 2022-02-08 PROCEDURE — 87040 BLOOD CULTURE FOR BACTERIA: CPT | Performed by: STUDENT IN AN ORGANIZED HEALTH CARE EDUCATION/TRAINING PROGRAM

## 2022-02-08 PROCEDURE — 25010000002 FENTANYL CITRATE (PF) 100 MCG/2ML SOLUTION: Performed by: NURSE ANESTHETIST, CERTIFIED REGISTERED

## 2022-02-08 PROCEDURE — 74176 CT ABD & PELVIS W/O CONTRAST: CPT

## 2022-02-08 PROCEDURE — 83690 ASSAY OF LIPASE: CPT | Performed by: STUDENT IN AN ORGANIZED HEALTH CARE EDUCATION/TRAINING PROGRAM

## 2022-02-08 PROCEDURE — 83605 ASSAY OF LACTIC ACID: CPT | Performed by: STUDENT IN AN ORGANIZED HEALTH CARE EDUCATION/TRAINING PROGRAM

## 2022-02-08 DEVICE — ETS45 RELOAD STANDARD 45MM
Type: IMPLANTABLE DEVICE | Site: ABDOMEN | Status: FUNCTIONAL
Brand: ENDOPATH

## 2022-02-08 DEVICE — ENDOPATH ETS45 2.5MM RELOADS (VASCULAR/THIN)
Type: IMPLANTABLE DEVICE | Site: ABDOMEN | Status: FUNCTIONAL
Brand: ENDOPATH

## 2022-02-08 RX ORDER — LIDOCAINE HYDROCHLORIDE 20 MG/ML
INJECTION, SOLUTION EPIDURAL; INFILTRATION; INTRACAUDAL; PERINEURAL AS NEEDED
Status: DISCONTINUED | OUTPATIENT
Start: 2022-02-08 | End: 2022-02-08 | Stop reason: SURG

## 2022-02-08 RX ORDER — PHENYLEPHRINE HCL IN 0.9% NACL 1 MG/10 ML
SYRINGE (ML) INTRAVENOUS AS NEEDED
Status: DISCONTINUED | OUTPATIENT
Start: 2022-02-08 | End: 2022-02-08 | Stop reason: SURG

## 2022-02-08 RX ORDER — DEXTROSE AND SODIUM CHLORIDE 5; .9 G/100ML; G/100ML
100 INJECTION, SOLUTION INTRAVENOUS CONTINUOUS
Status: DISCONTINUED | OUTPATIENT
Start: 2022-02-08 | End: 2022-02-09

## 2022-02-08 RX ORDER — ROCURONIUM BROMIDE 10 MG/ML
INJECTION, SOLUTION INTRAVENOUS AS NEEDED
Status: DISCONTINUED | OUTPATIENT
Start: 2022-02-08 | End: 2022-02-08 | Stop reason: SURG

## 2022-02-08 RX ORDER — FLUMAZENIL 0.1 MG/ML
0.2 INJECTION INTRAVENOUS AS NEEDED
Status: DISCONTINUED | OUTPATIENT
Start: 2022-02-08 | End: 2022-02-09 | Stop reason: HOSPADM

## 2022-02-08 RX ORDER — MORPHINE SULFATE 2 MG/ML
2 INJECTION, SOLUTION INTRAMUSCULAR; INTRAVENOUS
Status: DISPENSED | OUTPATIENT
Start: 2022-02-08 | End: 2022-02-15

## 2022-02-08 RX ORDER — OXYCODONE AND ACETAMINOPHEN 10; 325 MG/1; MG/1
1 TABLET ORAL ONCE AS NEEDED
Status: DISCONTINUED | OUTPATIENT
Start: 2022-02-08 | End: 2022-02-09 | Stop reason: HOSPADM

## 2022-02-08 RX ORDER — MORPHINE SULFATE 2 MG/ML
4 INJECTION, SOLUTION INTRAMUSCULAR; INTRAVENOUS
Status: ACTIVE | OUTPATIENT
Start: 2022-02-08 | End: 2022-02-15

## 2022-02-08 RX ORDER — ONDANSETRON 2 MG/ML
INJECTION INTRAMUSCULAR; INTRAVENOUS AS NEEDED
Status: DISCONTINUED | OUTPATIENT
Start: 2022-02-08 | End: 2022-02-08 | Stop reason: SURG

## 2022-02-08 RX ORDER — SODIUM CHLORIDE 0.9 % (FLUSH) 0.9 %
10 SYRINGE (ML) INJECTION EVERY 12 HOURS SCHEDULED
Status: CANCELLED | OUTPATIENT
Start: 2022-02-08

## 2022-02-08 RX ORDER — LABETALOL HYDROCHLORIDE 5 MG/ML
5 INJECTION, SOLUTION INTRAVENOUS
Status: DISCONTINUED | OUTPATIENT
Start: 2022-02-08 | End: 2022-02-09 | Stop reason: HOSPADM

## 2022-02-08 RX ORDER — PROPOFOL 10 MG/ML
VIAL (ML) INTRAVENOUS AS NEEDED
Status: DISCONTINUED | OUTPATIENT
Start: 2022-02-08 | End: 2022-02-08 | Stop reason: SURG

## 2022-02-08 RX ORDER — FENTANYL CITRATE 50 UG/ML
25 INJECTION, SOLUTION INTRAMUSCULAR; INTRAVENOUS
Status: DISCONTINUED | OUTPATIENT
Start: 2022-02-08 | End: 2022-02-09 | Stop reason: HOSPADM

## 2022-02-08 RX ORDER — SODIUM CHLORIDE 9 MG/ML
100 INJECTION, SOLUTION INTRAVENOUS CONTINUOUS
Status: CANCELLED | OUTPATIENT
Start: 2022-02-08

## 2022-02-08 RX ORDER — ONDANSETRON 2 MG/ML
4 INJECTION INTRAMUSCULAR; INTRAVENOUS ONCE
Status: COMPLETED | OUTPATIENT
Start: 2022-02-08 | End: 2022-02-08

## 2022-02-08 RX ORDER — MAGNESIUM HYDROXIDE 1200 MG/15ML
LIQUID ORAL AS NEEDED
Status: DISCONTINUED | OUTPATIENT
Start: 2022-02-08 | End: 2022-02-08 | Stop reason: HOSPADM

## 2022-02-08 RX ORDER — SODIUM CHLORIDE, SODIUM LACTATE, POTASSIUM CHLORIDE, CALCIUM CHLORIDE 600; 310; 30; 20 MG/100ML; MG/100ML; MG/100ML; MG/100ML
INJECTION, SOLUTION INTRAVENOUS CONTINUOUS PRN
Status: DISCONTINUED | OUTPATIENT
Start: 2022-02-08 | End: 2022-02-08 | Stop reason: SURG

## 2022-02-08 RX ORDER — ALBUMIN, HUMAN INJ 5% 5 %
SOLUTION INTRAVENOUS CONTINUOUS PRN
Status: DISCONTINUED | OUTPATIENT
Start: 2022-02-08 | End: 2022-02-08 | Stop reason: SURG

## 2022-02-08 RX ORDER — HYDROMORPHONE HYDROCHLORIDE 1 MG/ML
0.5 INJECTION, SOLUTION INTRAMUSCULAR; INTRAVENOUS; SUBCUTANEOUS
Status: DISCONTINUED | OUTPATIENT
Start: 2022-02-08 | End: 2022-02-09 | Stop reason: HOSPADM

## 2022-02-08 RX ORDER — ONDANSETRON 2 MG/ML
4 INJECTION INTRAMUSCULAR; INTRAVENOUS AS NEEDED
Status: ACTIVE | OUTPATIENT
Start: 2022-02-08 | End: 2022-02-08

## 2022-02-08 RX ORDER — SODIUM CHLORIDE 0.9 % (FLUSH) 0.9 %
10 SYRINGE (ML) INJECTION AS NEEDED
Status: CANCELLED | OUTPATIENT
Start: 2022-02-08

## 2022-02-08 RX ORDER — NEOSTIGMINE METHYLSULFATE 5 MG/5 ML
SYRINGE (ML) INTRAVENOUS AS NEEDED
Status: DISCONTINUED | OUTPATIENT
Start: 2022-02-08 | End: 2022-02-08 | Stop reason: SURG

## 2022-02-08 RX ORDER — NALOXONE HCL 0.4 MG/ML
0.04 VIAL (ML) INJECTION AS NEEDED
Status: DISCONTINUED | OUTPATIENT
Start: 2022-02-08 | End: 2022-02-09 | Stop reason: HOSPADM

## 2022-02-08 RX ORDER — ONDANSETRON 2 MG/ML
4 INJECTION INTRAMUSCULAR; INTRAVENOUS EVERY 6 HOURS PRN
Status: DISCONTINUED | OUTPATIENT
Start: 2022-02-08 | End: 2022-02-16 | Stop reason: HOSPADM

## 2022-02-08 RX ORDER — FENTANYL CITRATE 50 UG/ML
INJECTION, SOLUTION INTRAMUSCULAR; INTRAVENOUS AS NEEDED
Status: DISCONTINUED | OUTPATIENT
Start: 2022-02-08 | End: 2022-02-08 | Stop reason: SURG

## 2022-02-08 RX ADMIN — FENTANYL CITRATE 100 MCG: 50 INJECTION, SOLUTION INTRAMUSCULAR; INTRAVENOUS at 22:23

## 2022-02-08 RX ADMIN — HYDROMORPHONE HYDROCHLORIDE 0.5 MG: 1 INJECTION, SOLUTION INTRAMUSCULAR; INTRAVENOUS; SUBCUTANEOUS at 20:28

## 2022-02-08 RX ADMIN — VASOPRESSIN 2 ML: 20 INJECTION INTRAVENOUS at 22:31

## 2022-02-08 RX ADMIN — Medication 500 MCG: at 22:33

## 2022-02-08 RX ADMIN — FENTANYL CITRATE 100 MCG: 50 INJECTION, SOLUTION INTRAMUSCULAR; INTRAVENOUS at 22:58

## 2022-02-08 RX ADMIN — ROCURONIUM BROMIDE 30 MG: 10 INJECTION INTRAVENOUS at 22:23

## 2022-02-08 RX ADMIN — SODIUM CHLORIDE, POTASSIUM CHLORIDE, SODIUM LACTATE AND CALCIUM CHLORIDE: 600; 310; 30; 20 INJECTION, SOLUTION INTRAVENOUS at 22:59

## 2022-02-08 RX ADMIN — LIDOCAINE HYDROCHLORIDE 100 MG: 20 INJECTION, SOLUTION EPIDURAL; INFILTRATION; INTRACAUDAL; PERINEURAL at 22:23

## 2022-02-08 RX ADMIN — ALBUMIN HUMAN: 0.05 INJECTION, SOLUTION INTRAVENOUS at 23:45

## 2022-02-08 RX ADMIN — SODIUM CHLORIDE, POTASSIUM CHLORIDE, SODIUM LACTATE AND CALCIUM CHLORIDE 500 ML: 600; 310; 30; 20 INJECTION, SOLUTION INTRAVENOUS at 20:23

## 2022-02-08 RX ADMIN — TAZOBACTAM SODIUM AND PIPERACILLIN SODIUM 3.38 G: 375; 3 INJECTION, SOLUTION INTRAVENOUS at 22:21

## 2022-02-08 RX ADMIN — Medication 3 MG: at 23:04

## 2022-02-08 RX ADMIN — PROPOFOL 100 MG: 10 INJECTION, EMULSION INTRAVENOUS at 22:23

## 2022-02-08 RX ADMIN — GLYCOPYRROLATE 0.4 MG: 0.2 INJECTION, SOLUTION INTRAMUSCULAR; INTRAVENOUS at 23:04

## 2022-02-08 RX ADMIN — ONDANSETRON 4 MG: 2 INJECTION INTRAMUSCULAR; INTRAVENOUS at 23:04

## 2022-02-08 RX ADMIN — SODIUM CHLORIDE, POTASSIUM CHLORIDE, SODIUM LACTATE AND CALCIUM CHLORIDE: 600; 310; 30; 20 INJECTION, SOLUTION INTRAVENOUS at 22:21

## 2022-02-08 RX ADMIN — ONDANSETRON 4 MG: 2 INJECTION INTRAMUSCULAR; INTRAVENOUS at 20:28

## 2022-02-09 ENCOUNTER — APPOINTMENT (OUTPATIENT)
Dept: GENERAL RADIOLOGY | Facility: HOSPITAL | Age: 87
End: 2022-02-09

## 2022-02-09 ENCOUNTER — APPOINTMENT (OUTPATIENT)
Dept: CARDIOLOGY | Facility: HOSPITAL | Age: 87
End: 2022-02-09

## 2022-02-09 PROBLEM — R79.89 ELEVATED SERUM CREATININE: Status: ACTIVE | Noted: 2022-02-09

## 2022-02-09 PROBLEM — D72.829 LEUKOCYTOSIS: Status: ACTIVE | Noted: 2022-02-09

## 2022-02-09 PROBLEM — J96.01 ACUTE RESPIRATORY FAILURE WITH HYPOXIA (HCC): Status: ACTIVE | Noted: 2022-02-09

## 2022-02-09 LAB
ANION GAP SERPL CALCULATED.3IONS-SCNC: 12 MMOL/L (ref 5–15)
ARTERIAL PATENCY WRIST A: POSITIVE
ATMOSPHERIC PRESS: 748 MMHG
BASE EXCESS BLDA CALC-SCNC: -2.1 MMOL/L (ref 0–2)
BDY SITE: ABNORMAL
BH CV ECHO MEAS - AO MAX PG (FULL): 1.9 MMHG
BH CV ECHO MEAS - AO MAX PG: 4.6 MMHG
BH CV ECHO MEAS - AO MEAN PG (FULL): 1 MMHG
BH CV ECHO MEAS - AO MEAN PG: 2 MMHG
BH CV ECHO MEAS - AO ROOT AREA (BSA CORRECTED): 1.7
BH CV ECHO MEAS - AO ROOT AREA: 8.6 CM^2
BH CV ECHO MEAS - AO ROOT DIAM: 3.3 CM
BH CV ECHO MEAS - AO V2 MAX: 107 CM/SEC
BH CV ECHO MEAS - AO V2 MEAN: 66.5 CM/SEC
BH CV ECHO MEAS - AO V2 VTI: 17.4 CM
BH CV ECHO MEAS - AVA(I,A): 2.5 CM^2
BH CV ECHO MEAS - AVA(I,D): 2.5 CM^2
BH CV ECHO MEAS - AVA(V,A): 2.4 CM^2
BH CV ECHO MEAS - AVA(V,D): 2.4 CM^2
BH CV ECHO MEAS - BSA(HAYCOCK): 1.9 M^2
BH CV ECHO MEAS - BSA: 1.9 M^2
BH CV ECHO MEAS - BZI_BMI: 26.6 KILOGRAMS/M^2
BH CV ECHO MEAS - BZI_METRIC_HEIGHT: 170.2 CM
BH CV ECHO MEAS - BZI_METRIC_WEIGHT: 77.1 KG
BH CV ECHO MEAS - EDV(CUBED): 58 ML
BH CV ECHO MEAS - EDV(MOD-SP2): 32.5 ML
BH CV ECHO MEAS - EDV(MOD-SP4): 58 ML
BH CV ECHO MEAS - EDV(TEICH): 64.7 ML
BH CV ECHO MEAS - EF(CUBED): 57.5 %
BH CV ECHO MEAS - EF(MOD-SP2): 24.6 %
BH CV ECHO MEAS - EF(MOD-SP4): 50.9 %
BH CV ECHO MEAS - EF(TEICH): 49.8 %
BH CV ECHO MEAS - ESV(CUBED): 24.6 ML
BH CV ECHO MEAS - ESV(MOD-SP2): 24.5 ML
BH CV ECHO MEAS - ESV(MOD-SP4): 28.5 ML
BH CV ECHO MEAS - ESV(TEICH): 32.5 ML
BH CV ECHO MEAS - FS: 24.8 %
BH CV ECHO MEAS - IVS/LVPW: 1.1
BH CV ECHO MEAS - IVSD: 0.76 CM
BH CV ECHO MEAS - LA DIMENSION: 3 CM
BH CV ECHO MEAS - LA/AO: 0.91
BH CV ECHO MEAS - LAT PEAK E' VEL: 8.1 CM/SEC
BH CV ECHO MEAS - LV DIASTOLIC VOL/BSA (35-75): 30.7 ML/M^2
BH CV ECHO MEAS - LV MASS(C)D: 78.7 GRAMS
BH CV ECHO MEAS - LV MASS(C)DI: 41.7 GRAMS/M^2
BH CV ECHO MEAS - LV MAX PG: 2.6 MMHG
BH CV ECHO MEAS - LV MEAN PG: 1 MMHG
BH CV ECHO MEAS - LV SYSTOLIC VOL/BSA (12-30): 15.1 ML/M^2
BH CV ECHO MEAS - LV V1 MAX: 81.3 CM/SEC
BH CV ECHO MEAS - LV V1 MEAN: 55.9 CM/SEC
BH CV ECHO MEAS - LV V1 VTI: 13.9 CM
BH CV ECHO MEAS - LVIDD: 3.9 CM
BH CV ECHO MEAS - LVIDS: 2.9 CM
BH CV ECHO MEAS - LVLD AP2: 6.1 CM
BH CV ECHO MEAS - LVLD AP4: 7 CM
BH CV ECHO MEAS - LVLS AP2: 6.6 CM
BH CV ECHO MEAS - LVLS AP4: 5.7 CM
BH CV ECHO MEAS - LVOT AREA (M): 3.1 CM^2
BH CV ECHO MEAS - LVOT AREA: 3.1 CM^2
BH CV ECHO MEAS - LVOT DIAM: 2 CM
BH CV ECHO MEAS - LVPWD: 0.7 CM
BH CV ECHO MEAS - MED PEAK E' VEL: 4.35 CM/SEC
BH CV ECHO MEAS - SI(AO): 78.9 ML/M^2
BH CV ECHO MEAS - SI(CUBED): 17.7 ML/M^2
BH CV ECHO MEAS - SI(LVOT): 23.1 ML/M^2
BH CV ECHO MEAS - SI(MOD-SP2): 4.2 ML/M^2
BH CV ECHO MEAS - SI(MOD-SP4): 15.6 ML/M^2
BH CV ECHO MEAS - SI(TEICH): 17.1 ML/M^2
BH CV ECHO MEAS - SV(AO): 148.8 ML
BH CV ECHO MEAS - SV(CUBED): 33.3 ML
BH CV ECHO MEAS - SV(LVOT): 43.7 ML
BH CV ECHO MEAS - SV(MOD-SP2): 8 ML
BH CV ECHO MEAS - SV(MOD-SP4): 29.5 ML
BH CV ECHO MEAS - SV(TEICH): 32.2 ML
BILIRUB UR QL STRIP: NEGATIVE
BODY TEMPERATURE: 37 C
BUN SERPL-MCNC: 39 MG/DL (ref 8–23)
BUN/CREAT SERPL: 17.7 (ref 7–25)
CALCIUM SPEC-SCNC: 8.7 MG/DL (ref 8.6–10.5)
CHLORIDE SERPL-SCNC: 104 MMOL/L (ref 98–107)
CLARITY UR: CLEAR
CLUMPED PLATELETS: PRESENT
CO2 SERPL-SCNC: 24 MMOL/L (ref 22–29)
COLOR UR: YELLOW
CREAT SERPL-MCNC: 2.2 MG/DL (ref 0.76–1.27)
DEPRECATED RDW RBC AUTO: 44.7 FL (ref 37–54)
EOSINOPHIL # BLD MANUAL: 0.05 10*3/MM3 (ref 0–0.4)
EOSINOPHIL NFR BLD MANUAL: 1 % (ref 0.3–6.2)
ERYTHROCYTE [DISTWIDTH] IN BLOOD BY AUTOMATED COUNT: 13.6 % (ref 12.3–15.4)
GAS FLOW AIRWAY: 10 LPM
GFR SERPL CREATININE-BSD FRML MDRD: 28 ML/MIN/1.73
GIANT PLATELETS: ABNORMAL
GLUCOSE SERPL-MCNC: 112 MG/DL (ref 65–99)
GLUCOSE UR STRIP-MCNC: NEGATIVE MG/DL
HCO3 BLDA-SCNC: 23.4 MMOL/L (ref 20–26)
HCT VFR BLD AUTO: 40.5 % (ref 37.5–51)
HGB BLD-MCNC: 12.7 G/DL (ref 13–17.7)
HGB UR QL STRIP.AUTO: NEGATIVE
KETONES UR QL STRIP: NEGATIVE
LEFT ATRIUM VOLUME INDEX: 68.7 ML/M2
LEFT ATRIUM VOLUME: 36.3 CM3
LEUKOCYTE ESTERASE UR QL STRIP.AUTO: NEGATIVE
LYMPHOCYTES # BLD MANUAL: 1.2 10*3/MM3 (ref 0.7–3.1)
LYMPHOCYTES NFR BLD MANUAL: 4 % (ref 5–12)
Lab: ABNORMAL
Lab: ABNORMAL
MAXIMAL PREDICTED HEART RATE: 131 BPM
MCH RBC QN AUTO: 28 PG (ref 26.6–33)
MCHC RBC AUTO-ENTMCNC: 31.4 G/DL (ref 31.5–35.7)
MCV RBC AUTO: 89.2 FL (ref 79–97)
METAMYELOCYTES NFR BLD MANUAL: 5 % (ref 0–0)
MODALITY: ABNORMAL
MONOCYTES # BLD: 0.2 10*3/MM3 (ref 0.1–0.9)
NEUTROPHILS # BLD AUTO: 3.29 10*3/MM3 (ref 1.7–7)
NEUTROPHILS NFR BLD MANUAL: 51 % (ref 42.7–76)
NEUTS BAND NFR BLD MANUAL: 15 % (ref 0–5)
NITRITE UR QL STRIP: NEGATIVE
NOTIFIED BY: ABNORMAL
NOTIFIED WHO: ABNORMAL
NT-PROBNP SERPL-MCNC: 1964 PG/ML (ref 0–1800)
PCO2 BLDA: 41.7 MM HG (ref 35–45)
PCO2 TEMP ADJ BLD: 41.7 MM HG (ref 35–45)
PH BLDA: 7.36 PH UNITS (ref 7.35–7.45)
PH UR STRIP.AUTO: <=5 [PH] (ref 5–8)
PH, TEMP CORRECTED: 7.36 PH UNITS (ref 7.35–7.45)
PLATELET # BLD AUTO: 284 10*3/MM3 (ref 140–450)
PMV BLD AUTO: 9.9 FL (ref 6–12)
PO2 BLDA: 42.5 MM HG (ref 83–108)
PO2 TEMP ADJ BLD: 42.5 MM HG (ref 83–108)
POTASSIUM SERPL-SCNC: 5.1 MMOL/L (ref 3.5–5.2)
PROT UR QL STRIP: NEGATIVE
QT INTERVAL: 344 MS
QTC INTERVAL: 409 MS
RBC # BLD AUTO: 4.54 10*6/MM3 (ref 4.14–5.8)
RBC MORPH BLD: NORMAL
SAO2 % BLDCOA: 76.3 % (ref 94–99)
SODIUM SERPL-SCNC: 140 MMOL/L (ref 136–145)
SP GR UR STRIP: 1.01 (ref 1–1.03)
STRESS TARGET HR: 111 BPM
UROBILINOGEN UR QL STRIP: NORMAL
VARIANT LYMPHS NFR BLD MANUAL: 2 % (ref 0–5)
VARIANT LYMPHS NFR BLD MANUAL: 22 % (ref 19.6–45.3)
VENTILATOR MODE: ABNORMAL
WBC MORPH BLD: NORMAL
WBC NRBC COR # BLD: 4.98 10*3/MM3 (ref 3.4–10.8)

## 2022-02-09 PROCEDURE — 94660 CPAP INITIATION&MGMT: CPT

## 2022-02-09 PROCEDURE — 80048 BASIC METABOLIC PNL TOTAL CA: CPT | Performed by: SPECIALIST

## 2022-02-09 PROCEDURE — 93306 TTE W/DOPPLER COMPLETE: CPT

## 2022-02-09 PROCEDURE — 25010000002 FUROSEMIDE PER 20 MG: Performed by: INTERNAL MEDICINE

## 2022-02-09 PROCEDURE — 25010000002 ONDANSETRON PER 1 MG: Performed by: SPECIALIST

## 2022-02-09 PROCEDURE — 85025 COMPLETE CBC W/AUTO DIFF WBC: CPT | Performed by: SPECIALIST

## 2022-02-09 PROCEDURE — 94799 UNLISTED PULMONARY SVC/PX: CPT

## 2022-02-09 PROCEDURE — 71045 X-RAY EXAM CHEST 1 VIEW: CPT

## 2022-02-09 PROCEDURE — 83880 ASSAY OF NATRIURETIC PEPTIDE: CPT | Performed by: INTERNAL MEDICINE

## 2022-02-09 PROCEDURE — 82803 BLOOD GASES ANY COMBINATION: CPT

## 2022-02-09 PROCEDURE — 36600 WITHDRAWAL OF ARTERIAL BLOOD: CPT

## 2022-02-09 PROCEDURE — 25010000002 ENOXAPARIN PER 10 MG: Performed by: SPECIALIST

## 2022-02-09 PROCEDURE — 25010000002 FUROSEMIDE PER 20 MG: Performed by: ANESTHESIOLOGY

## 2022-02-09 PROCEDURE — 93306 TTE W/DOPPLER COMPLETE: CPT | Performed by: EMERGENCY MEDICINE

## 2022-02-09 PROCEDURE — 85007 BL SMEAR W/DIFF WBC COUNT: CPT | Performed by: SPECIALIST

## 2022-02-09 PROCEDURE — 25010000002 PIPERACILLIN SOD-TAZOBACTAM PER 1 G: Performed by: SPECIALIST

## 2022-02-09 PROCEDURE — 25010000002 PERFLUTREN 6.52 MG/ML SUSPENSION: Performed by: INTERNAL MEDICINE

## 2022-02-09 PROCEDURE — 81003 URINALYSIS AUTO W/O SCOPE: CPT | Performed by: SPECIALIST

## 2022-02-09 RX ORDER — FUROSEMIDE 10 MG/ML
40 INJECTION INTRAMUSCULAR; INTRAVENOUS ONCE
Status: COMPLETED | OUTPATIENT
Start: 2022-02-09 | End: 2022-02-09

## 2022-02-09 RX ORDER — FUROSEMIDE 10 MG/ML
20 INJECTION INTRAMUSCULAR; INTRAVENOUS ONCE
Status: DISCONTINUED | OUTPATIENT
Start: 2022-02-09 | End: 2022-02-09

## 2022-02-09 RX ORDER — IPRATROPIUM BROMIDE AND ALBUTEROL SULFATE 2.5; .5 MG/3ML; MG/3ML
SOLUTION RESPIRATORY (INHALATION)
Status: DISPENSED
Start: 2022-02-09 | End: 2022-02-09

## 2022-02-09 RX ORDER — IPRATROPIUM BROMIDE AND ALBUTEROL SULFATE 2.5; .5 MG/3ML; MG/3ML
3 SOLUTION RESPIRATORY (INHALATION) ONCE
Status: COMPLETED | OUTPATIENT
Start: 2022-02-09 | End: 2022-02-09

## 2022-02-09 RX ORDER — FUROSEMIDE 10 MG/ML
10 INJECTION INTRAMUSCULAR; INTRAVENOUS ONCE
Status: COMPLETED | OUTPATIENT
Start: 2022-02-09 | End: 2022-02-09

## 2022-02-09 RX ADMIN — PERFLUTREN 1.17 MG: 6.52 INJECTION, SUSPENSION INTRAVENOUS at 10:20

## 2022-02-09 RX ADMIN — TAZOBACTAM SODIUM AND PIPERACILLIN SODIUM 2.25 G: 250; 2 INJECTION, SOLUTION INTRAVENOUS at 08:39

## 2022-02-09 RX ADMIN — ONDANSETRON 4 MG: 2 INJECTION INTRAMUSCULAR; INTRAVENOUS at 15:31

## 2022-02-09 RX ADMIN — FUROSEMIDE 10 MG: 10 INJECTION, SOLUTION INTRAMUSCULAR; INTRAVENOUS at 06:45

## 2022-02-09 RX ADMIN — IPRATROPIUM BROMIDE AND ALBUTEROL SULFATE 3 ML: 2.5; .5 SOLUTION RESPIRATORY (INHALATION) at 01:16

## 2022-02-09 RX ADMIN — TAZOBACTAM SODIUM AND PIPERACILLIN SODIUM 2.25 G: 250; 2 INJECTION, SOLUTION INTRAVENOUS at 15:48

## 2022-02-09 RX ADMIN — ENOXAPARIN SODIUM 30 MG: 30 INJECTION SUBCUTANEOUS at 10:32

## 2022-02-09 RX ADMIN — FUROSEMIDE 40 MG: 10 INJECTION, SOLUTION INTRAVENOUS at 10:32

## 2022-02-09 RX ADMIN — TAZOBACTAM SODIUM AND PIPERACILLIN SODIUM 2.25 G: 250; 2 INJECTION, SOLUTION INTRAVENOUS at 23:55

## 2022-02-09 NOTE — ANESTHESIA PROCEDURE NOTES
Airway  Urgency: elective    Date/Time: 2/8/2022 10:25 PM  Airway not difficult    General Information and Staff    Patient location during procedure: OR  CRNA: Ashwin Crockett CRNA    Indications and Patient Condition  Indications for airway management: airway protection    Preoxygenated: yes  Mask difficulty assessment: 1 - vent by mask    Final Airway Details  Final airway type: endotracheal airway      Successful airway: ETT  Cuffed: yes   Successful intubation technique: direct laryngoscopy  Endotracheal tube insertion site: oral  Blade: Ontiveros  Blade size: 2  ETT size (mm): 7.5  Cormack-Lehane Classification: grade I - full view of glottis  Placement verified by: chest auscultation and capnometry   Measured from: teeth  ETT/EBT  to teeth (cm): 23  Number of attempts at approach: 1  Assessment: lips, teeth, and gum same as pre-op and atraumatic intubation

## 2022-02-09 NOTE — ANESTHESIA POSTPROCEDURE EVALUATION
"Patient: Kj Cerrato    Procedure Summary     Date: 02/08/22 Room / Location:  [facility] OR 06 /  [facility] OR    Anesthesia Start: 2221 Anesthesia Stop: 2347    Procedure: APPENDECTOMY LAPAROSCOPIC (N/A Abdomen) Diagnosis:     Surgeons: Milly Valverde MD Provider: Ashwin Crockett CRNA    Anesthesia Type: general ASA Status: 3 - Emergent          Anesthesia Type: general    Vitals  Vitals Value Taken Time   /64 02/09/22 1024   Temp 99.5 °F (37.5 °C) 02/09/22 1024   Pulse 77 02/09/22 1049   Resp 22 02/09/22 1024   SpO2 95 % 02/09/22 1049   Vitals shown include unvalidated device data.        Post Anesthesia Care and Evaluation    Patient location during evaluation: PACU  Patient participation: complete - patient participated  Level of consciousness: awake and alert  Pain management: adequate  Airway patency: patent  Anesthetic complications: No anesthetic complications  PONV Status: none  Cardiovascular status: acceptable and hemodynamically stable  Respiratory status: acceptable  Hydration status: acceptable    Comments: Blood pressure 116/64, pulse 78, temperature 99.5 °F (37.5 °C), temperature source Temporal, resp. rate 22, height 170.2 cm (67\"), weight 77.1 kg (170 lb), SpO2 94 %.    I was called to see patient first thing this AM. I reviewed CXR and labs. Xray c/w pulmonary edema. I ordered cpap, low dose lasix (10mg iv). He continued to progress, I updated Dr Barber and decision made to obtain ICU bed for patient. Hospitalist consulted.       "

## 2022-02-09 NOTE — OP NOTE
Milly Valverde MD Operative Note    Kj Cerrato  2/8/2022    Pre-op Diagnosis:   * No pre-op diagnosis entered *    Post-op Diagnosis:     same    Procedure/CPT® Codes:      Procedure(s):  APPENDECTOMY LAPAROSCOPIC    Surgeon(s):  Milly Valverde MD    Anesthesia: General    Staff:   Circulator: Ivonne Baires RN; Sandeep Yoo RN  Scrub Person: Gabi Carbajal; Lauren Ruth; Jennifer Pagan; Claire Palmer    Estimated Blood Loss: minimal    Specimens:                Specimens     ID Source Type Tests Collected By Collected At Frozen?    A Large Intestine, Appendix Tissue · TISSUE PATHOLOGY EXAM   Milly Valverde MD 2/8/22 4276 No    This specimen was not marked as sent.            Drains:   Closed/Suction Drain Superior Abdomen Bulb 15 Fr. (Active)       Urethral Catheter Double-lumen 16 Fr. (Active)       Indications: Appendicitis    Findings: Ruptured appendicitis with peritonitis.  A lot of fibrinous exudate in the right lower quadrant and purulent ascites in the right lower quadrant and pelvis.  Also some cloudy peritoneal fluid up subhepatic    Complications: none    Procedure: The patient was brought to the operating room and placed in the supine position.  After induction of general anesthesia and infusion of IV antibiotics, the patient was prepped and draped in the usual sterile fashion.  Veress needle technique was used through a supraumbilical 12 mm incision and the abdomen was insufflated to a pressure of 15 mmHg.  2 other 5 mm ports were placed in the lower abdomen.  The abdomen was inspected.  There was a lot of fibrinous exudate and edema in the right lower quadrant.  Breaking up these inflammatory adhesions revealed a appendix that had ruptured with several areas of necrosis.  A lot of purulent ascitic fluid in the right lower quadrant that was suctioned in the right paracolic gutter and down in the pelvis.  This was all suctioned and irrigated.  The appendix was very edematous.  It  was friable and poor while grasping.  The mesoappendix was very edematous.  I freed it at the base.  One of the areas of perforation was very close to the base of the cecum.  We freed the window in the mesoappendix with cautery and then divided the mesoappendix with the stapling device.  I then lifted up the appendix to try to get down as far down onto the base the cecum is possible.  I then divided with the CAIO.  The staple line appeared intact but there was a lot of edema and fibrinous exudate around the base of the cecum.  The appendix was then placed into a bag removed through the umbilical port along with the appendicolith.  Copious irrigation with 2 L was performed around the liver and the right paracolic gutter appendiceal fossa and pelvis.  There is also intraloop irrigation performed as well.  I placed the drain through the suprapubic port site and laid into the operative bed and sutured in place with 2-0 silk.  The abdomen was desufflated ports were removed.  Fascia was closed with 0 Vicryl.  Wound closed with 4-0 Vicryl subcuticular.  Dressings placed patient awakened and transferred to the cover room in guarded condition having tolerated procedure well.  He did have some hypotension along with probably prerenal dehydration.  Sánchez catheter was left in place.    Milly Valverde MD     Date: 2/8/2022  Time: 23:29 CST

## 2022-02-09 NOTE — H&P
Milly Valverde MD  H&P    Patient Care Team:  Philippe Oliveira DO as PCP - General (Family Medicine)    Chief complaint appendicitis    Subjective     Kj Cerrato  is a 89 y.o. male with right lower quadrant abdominal pain for about 24 hours associated with some nausea and poor appetite.  No fevers no vomiting no diarrhea no constipation no urinary symptoms..      Review of Systems   Pertinent items are noted in HPI, all other systems reviewed and negative    History  Past Medical History:   Diagnosis Date   • Atherosclerosis    • BPH (benign prostatic hyperplasia)    • Carotid artery disease (HCC)    • High cholesterol    • Hypertension    • Kidney cysts    • Kidney stone    • Renal artery stenosis (HCC)    • Renal artery stenosis (HCC) 2018   • Renal insufficiency    • Solitary lung nodule    • Stroke (HCC)     Residual speech problems.     Past Surgical History:   Procedure Laterality Date   • CAROTID ENDARTERECTOMY Right     Per Dr Idris Dotson   • PROSTATE SURGERY       Family History   Problem Relation Age of Onset   • Other Mother          after cranial surgery   • Cancer Father      Social History     Tobacco Use   • Smoking status: Former Smoker     Quit date: 1970     Years since quittin.1   • Smokeless tobacco: Never Used   Substance Use Topics   • Alcohol use: No   • Drug use: No     Medications Prior to Admission   Medication Sig Dispense Refill Last Dose   • aspirin (ASPIR) 81 MG EC tablet Aspir-81      • atenolol (TENORMIN) 50 MG tablet atenolol 50 mg tablet   Take 1 tablet every day by oral route.      • cholecalciferol (VITAMIN D3) 1000 units tablet Take 1,000 Units by mouth Daily.      • clopidogrel (PLAVIX) 75 MG tablet clopidogrel 75 mg tablet   Take 1 tablet every day by oral route.      • finasteride (PROSCAR) 5 MG tablet finasteride 5 mg tablet   Take 1 tablet every day by oral route.      • gabapentin (NEURONTIN) 300 MG capsule TK 1 C PO BID  3    • pantoprazole  (PROTONIX) 40 MG EC tablet Protonix 40 mg tablet,delayed release   Take 1 tablet every day by oral route at bedtime.      • pravastatin (PRAVACHOL) 40 MG tablet pravastatin 40 mg tablet   Take 1 tablet every day by oral route.      • vitamin B-12 (CYANOCOBALAMIN) 1000 MCG tablet Take 1,000 mcg by mouth Daily.        Allergies:  Patient has no known allergies.    Objective     Vital Signs  Temp:  [98.6 °F (37 °C)] 98.6 °F (37 °C)  Heart Rate:  [] 80  Resp:  [18] 18  BP: ()/(56-65) 92/56    Physical Exam:      General Appearance:    Alert, cooperative, in no acute distress   Head:    Normocephalic, without obvious abnormality, atraumatic   Eyes:            Lids and lashes normal, conjunctivae and sclerae normal, no   icterus, no pallor, corneas clear, PERRLA   Ears:    Ears appear intact with no abnormalities noted   Neck:   No adenopathy, supple, trachea midline   Back:     No kyphosis present, no scoliosis present, no skin lesions,      erythema or scars, no tenderness to percussion or                   palpation,   range of motion normal   Lungs:     Clear to auscultation,respirations regular, even and                  unlabored    Heart:    Regular rhythm and normal rate, normal S1 and S2, no            murmur, no gallop, no rub, no click   Chest Wall:    No abnormalities observed   Abdomen:    Tender right lower quadrant with voluntary guarding   Rectal:     Deferred   Extremities:   Moves all extremities well, no edema, no cyanosis, no             redness   Pulses:   Pulses palpable and equal bilaterally   Skin:   No bleeding, bruising or rash   Lymph nodes:   No palpable adenopathy   Neurologic:   No focal deficits       Results Review:      Lab Results (last 72 hours)     Procedure Component Value Units Date/Time    Blood Culture - Blood, Arm, Right [[id number]] Collected: 02/08/22 2145    Specimen: Blood from Arm, Right Updated: 02/08/22 2204    Blood Culture - Blood, Arm, Right [[id number]]  Collected: 02/08/22 2155    Specimen: Blood from Arm, Right Updated: 02/08/22 2204    Lactic Acid, Plasma [[id number]] Collected: 02/08/22 2157    Specimen: Blood Updated: 02/08/22 2202    COVID PRE-OP / PRE-PROCEDURE SCREENING ORDER (NO ISOLATION) - Swab, Nasal Cavity [[id number]]  (Normal) Collected: 02/08/22 1956    Specimen: Swab from Nasal Cavity Updated: 02/08/22 2048    Narrative:      The following orders were created for panel order COVID PRE-OP / PRE-PROCEDURE SCREENING ORDER (NO ISOLATION) - Swab, Nasal Cavity.  Procedure                               Abnormality         Status                     ---------                               -----------         ------                     COVID-19,[facility]...[[id number]]  Normal              Final result                 Please view results for these tests on the individual orders.    COVID-19,[facility]-HOUSE,Nasal Swab No Transport Media 3-4 HR TAT - Swab, Nasal Cavity [[id number]]  (Normal) Collected: 02/08/22 1956    Specimen: Swab from Nasal Cavity Updated: 02/08/22 2048     COVID19 Not Detected    Narrative:      Fact sheet for providers: https://www.fda.gov/media/646705/download     Fact sheet for patients: https://www.fda.gov/media/118444/download    Test performed by PCR.    Consider negative results in combination with clinical observations, patient history, and epidemiological information.    Comprehensive Metabolic Panel [[id number]]  (Abnormal) Collected: 02/08/22 1955    Specimen: Blood Updated: 02/08/22 2022     Glucose 135 mg/dL      BUN 33 mg/dL      Creatinine 2.19 mg/dL      Sodium 142 mmol/L      Potassium 4.7 mmol/L      Chloride 104 mmol/L      CO2 22.0 mmol/L      Calcium 9.8 mg/dL      Total Protein 7.8 g/dL      Albumin 4.30 g/dL      ALT (SGPT) 13 U/L      AST (SGOT) 15 U/L      Alkaline Phosphatase 39 U/L      Total Bilirubin 1.6 mg/dL      eGFR Non African Amer 28 mL/min/1.73      Globulin 3.5 gm/dL      A/G Ratio 1.2 g/dL       BUN/Creatinine Ratio 15.1     Anion Gap 16.0 mmol/L     Narrative:      GFR Normal >60  Chronic Kidney Disease <60  Kidney Failure <15      Lipase [[id number]]  (Normal) Collected: 02/08/22 1955    Specimen: Blood Updated: 02/08/22 2022     Lipase 15 U/L     CBC & Differential [[id number]]  (Abnormal) Collected: 02/08/22 1956    Specimen: Blood Updated: 02/08/22 2004    Narrative:      The following orders were created for panel order CBC & Differential.  Procedure                               Abnormality         Status                     ---------                               -----------         ------                     CBC Auto Differential[[id number]]        Abnormal            Final result                 Please view results for these tests on the individual orders.    CBC Auto Differential [[id number]]  (Abnormal) Collected: 02/08/22 1956    Specimen: Blood Updated: 02/08/22 2004     WBC 19.04 10*3/mm3      RBC 5.18 10*6/mm3      Hemoglobin 14.4 g/dL      Hematocrit 46.0 %      MCV 88.8 fL      MCH 27.8 pg      MCHC 31.3 g/dL      RDW 13.5 %      RDW-SD 43.9 fl      MPV 9.9 fL      Platelets 384 10*3/mm3      Neutrophil % 87.9 %      Lymphocyte % 4.3 %      Monocyte % 7.1 %      Eosinophil % 0.0 %      Basophil % 0.2 %      Immature Grans % 0.5 %      Neutrophils, Absolute 16.74 10*3/mm3      Lymphocytes, Absolute 0.81 10*3/mm3      Monocytes, Absolute 1.35 10*3/mm3      Eosinophils, Absolute 0.00 10*3/mm3      Basophils, Absolute 0.04 10*3/mm3      Immature Grans, Absolute 0.10 10*3/mm3      nRBC 0.0 /100 WBC         Imaging Results (Last 72 Hours)     Procedure Component Value Units Date/Time    CT Abdomen Pelvis Without Contrast [[id number]] Collected: 02/08/22 2121     Updated: 02/08/22 2129    Narrative:      EXAMINATION:   CT ABDOMEN PELVIS WO CONTRAST-  2/8/2022 9:21 PM CST     HISTORY: CT ABDOMEN PELVIS WO CONTRAST- 2/8/2022 9:04 PM CST     HISTORY: abdominal pain      COMPARISON: None      DOSE LENGTH  PRODUCT: 253 mGy cm. Automated exposure control was also  utilized to decrease patient radiation dose.     TECHNIQUE: Noncontrast enhanced images of the abdomen and pelvis  obtained without oral contrast. Multiplanar reformatted images were  provided for review.      FINDINGS:   The lung bases and base of the heart are unremarkable.      LIVER: No focal liver lesion.      BILIARY SYSTEM: The gallbladder is unremarkable. No intrahepatic or  extrahepatic ductal dilatation.      PANCREAS: No focal pancreatic lesion.      SPLEEN: Unremarkable.      KIDNEYS AND ADRENALS: Adrenal glands are visualized. Right renal contour  is unremarkable. Nonobstructing calculi are present lower pole of the  left kidney..  The ureters are decompressed and normal in appearance.     RETROPERITONEUM: No mass, lymphadenopathy or hemorrhage.      GI TRACT: No evidence of obstruction or bowel wall thickening. The  appendix is visualized. The appendix is enlarged. The appendix is  edematous. An appendicolith is present. Consistent with acute  appendicitis. The appendix is approximately 2 cm in diameter.  Diverticulosis is present.     .     OTHER: There is no mesenteric mass, lymphadenopathy or fluid collection.  The osseous structures and soft tissues demonstrate no worrisome  lesions. Vascular calcifications present abdominal aorta and iliac  arteries.      PELVIS: No mass lesion, fluid collection or significant lymphadenopathy  is seen in the pelvis. The urinary bladder is normal in appearance. A  prominent calcifications present possibly associated with the prostate.       Impression:      1. Acute appendicitis.        2. Diverticulosis.        3. Vascular calcifications present abdominal aorta.  4. Left nephrolithiasis     These results were called to Dr. JACKSON 9:25 PM.         This report was finalized on 02/08/2022 21:25 by Dr. Yang Chaudhary MD.          Assessment/Plan       * No active hospital problems. *      We will proceed with  laparoscopic appendectomy.  Risks of bleeding infection injury as well as risks in general 49-year-old undergoing emergency surgery all were discussed with the patient and his daughter who is at the bedside.  In addition he is have some acute kidney injury with a creatinine of 2.1.  He has not been drinking today so most likely the combination of chronic kidney disease and prerenal dehydration.  We will hydrate      Milly Valverde MD  02/08/22  22:21 CST

## 2022-02-09 NOTE — ANESTHESIA PREPROCEDURE EVALUATION
Anesthesia Evaluation     Patient summary reviewed and Nursing notes reviewed   NPO Solid Status: > 8 hours  NPO Liquid Status: > 6 hours           Airway   Mallampati: II  TM distance: >3 FB  Neck ROM: full  No difficulty expected  Dental    (+) partials and implants    Pulmonary    (+) a smoker Former,   Cardiovascular   Exercise tolerance: good (4-7 METS)    ECG reviewed  Patient on routine beta blocker and Beta blocker given within 24 hours of surgery    (+) hypertension well controlled 2 medications or greater, hyperlipidemia,  carotid artery disease carotid bilateral      Neuro/Psych  (+) CVA,    GI/Hepatic/Renal/Endo    (+)   renal disease stones,     Musculoskeletal (-) negative ROS    Abdominal    Substance History - negative use     OB/GYN          Other - negative ROS                       Anesthesia Plan    ASA 3 - emergent     general     intravenous induction     Anesthetic plan, all risks, benefits, and alternatives have been provided, discussed and informed consent has been obtained with: patient.  Use of blood products discussed with patient  Consented to blood products.       CODE STATUS:

## 2022-02-09 NOTE — ED PROVIDER NOTES
Subjective   Patient states that he has been having abdominal pain in his lower abdomen for the past day.  States that he was told by his doctor to come in for further evaluation.  He states that he is not having any vomiting, dysuria, hematuria, hematochezia, melena.  States that he has pain whenever he pushes on it.  Denies any recent falls.  Denies any new movements.          Review of Systems   All other systems reviewed and are negative.      Past Medical History:   Diagnosis Date   • Atherosclerosis    • BPH (benign prostatic hyperplasia)    • Carotid artery disease (HCC)    • High cholesterol    • Hypertension    • Kidney cysts    • Kidney stone    • Renal artery stenosis (HCC)    • Renal artery stenosis (HCC) 2018   • Renal insufficiency    • Solitary lung nodule    • Stroke (HCC)     Residual speech problems.       No Known Allergies    Past Surgical History:   Procedure Laterality Date   • APPENDECTOMY N/A 2022    Procedure: APPENDECTOMY LAPAROSCOPIC;  Surgeon: Milly Valverde MD;  Location: [facility];  Service: General;  Laterality: N/A;   • CAROTID ENDARTERECTOMY Right     Per Dr Idris Dotson   • PROSTATE SURGERY         Family History   Problem Relation Age of Onset   • Other Mother          after cranial surgery   • Cancer Father        Social History     Socioeconomic History   • Marital status:    Tobacco Use   • Smoking status: Former Smoker     Quit date: 1970     Years since quittin.1   • Smokeless tobacco: Never Used   Substance and Sexual Activity   • Alcohol use: No   • Drug use: No   • Sexual activity: Defer           Objective   Physical Exam  Vitals and nursing note reviewed.   Constitutional:       General: He is not in acute distress.     Appearance: He is not toxic-appearing or diaphoretic.   HENT:      Head: Normocephalic and atraumatic.      Nose: Nose normal.   Eyes:      General:         Right eye: No discharge.         Left eye: No discharge.       Extraocular Movements: Extraocular movements intact.      Conjunctiva/sclera: Conjunctivae normal.   Cardiovascular:      Rate and Rhythm: Normal rate.   Pulmonary:      Effort: Pulmonary effort is normal. No respiratory distress.      Breath sounds: No stridor. No rhonchi.   Abdominal:      General: Abdomen is flat.      Tenderness: There is abdominal tenderness (LLQ, suprapubic, LUQ). There is no guarding or rebound.   Musculoskeletal:         General: Normal range of motion.      Cervical back: No rigidity.   Skin:     General: Skin is warm.   Neurological:      General: No focal deficit present.      Mental Status: He is alert and oriented to person, place, and time.      Cranial Nerves: No cranial nerve deficit.      Sensory: No sensory deficit.      Motor: No weakness.   Psychiatric:         Mood and Affect: Mood normal.         Behavior: Behavior normal.         Thought Content: Thought content normal.         Judgment: Judgment normal.         Procedures           ED Course  ED Course as of 02/12/22 0007   Tue Feb 08, 2022   2146 Patient CT scan shows evidence of appendicitis. [NP]      ED Course User Index  [NP] Karen Lowe MD                                                 MDM  Number of Diagnoses or Management Options  Acute appendicitis, unspecified acute appendicitis type  Diagnosis management comments: This is a 89yoM presenting with abdominal pain. Patient arrived hemodynamically stable and was afebrile. Patient was placed on the monitor and IV access was established.     Differentials include, but are not limited to GERD, constipation, vascular catastrophe, appendicitis, torsion, cholecystitis, pancreatitis.     Plan to obtain cbc, cmp, ua, CT abdomen/pelvis, control pain, and reassess.     Presentation not consistent with other acute, emergent causes of abdominal pain at this time. Low suspicion for dissection there is no widened mediastinum, hypotension, pulse deficits, and no pain tearing  through to the back. Low suspicion for tamponade as there is no JVD, muffled heart sounds, electrical alternans on EKG, and no hypotension. Low suspicion for nephrolithiasis without flank pain radiating to groin, hematuria, or any history of kidney stones. Low suspicion for cholecystitis with negative murphys sign, no history of gall stones, and no recent pale stools or pain after eating. Low suspicion for ulcerative disease as pain is not consistent with foods and is not relieved when patient refrains from eating. Low suspicion for gastroenteritis without evidence of diarrhea, fevers, or recent uncooked food exposure.     The workup was reviewed and found to have evidence of acute appendicitis. Broad spectrum antibiotics initiated. Made NPO. Discussed with Dr. Valverde, who will admit for operative intervention. I reassessed the patient and discussed the findings of the work up so far. I explained my impression of the workup and findings to him and addressed all of his questions regarding the emergency department evaluation, diagnosis, and treatment plan in plain and simple language that he was able to understand. I told him that the next step in treatment would be admission to the hospital for further workup and care. He voiced agreement with the plan of care so far and had no further questions. I told him that there is always some diagnostic uncertainty in the ER and that his work up, current physical exam, and even his current presentation may not always reveal other underlying conditions. I also went over the fact that his condition may change or worsen while being in the hospital. I told him that they may even find more things that require treatment or follow-up. He expressed understanding and agreed that there are reasonable limitations with the practice of emergency medicine.                Amount and/or Complexity of Data Reviewed  Clinical lab tests: reviewed and ordered  Tests in the radiology section of  CPT®: reviewed and ordered  Decide to obtain previous medical records or to obtain history from someone other than the patient: yes        Final diagnoses:   Acute appendicitis, unspecified acute appendicitis type       ED Disposition  ED Disposition     ED Disposition Condition Comment    Send to OR            No follow-up provider specified.       Medication List      No changes were made to your prescriptions during this visit.          Karen Lowe MD  02/12/22 0007

## 2022-02-09 NOTE — CONSULTS
[facility] Medicine Consult  Inpatient Hospitalist Consult  Consult performed by: Idris Sandhu DO  Consult ordered by: Milly Valverde MD          Date of Admission: 2/8/2022  Date of Consult: 02/09/22    Primary Care Physician: Philippe Oliveira DO  Referring Physician: Dr. Valverde  Chief Complaint/Reason for Consultation: Acute respiratory failure with hypoxia    Subjective   History of Present Illness  Patient is an 89-year-old male seen in recovery room 1 in the PACU.  Has a history of carotid disease and hyperlipidemia.  Denies any history of renal disease although he has renal insufficiency noted in his chart prior.  He was admitted to the hospital last night with abdominal pain and subsequent CT showing acute sinusitis.  He is brought to the OR last night and did well but post OR became increasingly hypoxic.  Now on BiPAP 12 L or 80% FiO2.  For this reason we were consulted.  Patient looked comfortable when seen.  Said he felt better than prior.  States prior to his initial abdominal pain symptoms that brought him to the hospital he been feeling well at home.  Denies any recent increase in fatigue, chest pain, cough, phlegm.  No focal numbness, tingling, weakness.  Abdominal symptoms as per HPI on arrival.  Chest x-ray performed and personally reviewed with what appears to be bilateral patchy infiltrates which are consistent with pulmonary edema.    Review of Systems   Otherwise complete ROS is negative except as mentioned above.    Past Medical History:   Past Medical History:   Diagnosis Date   • Atherosclerosis    • BPH (benign prostatic hyperplasia)    • Carotid artery disease (HCC)    • High cholesterol    • Hypertension    • Kidney cysts    • Kidney stone    • Renal artery stenosis (HCC)    • Renal artery stenosis (HCC) 8/23/2018   • Renal insufficiency    • Solitary lung nodule    • Stroke (HCC)     Residual speech problems.     Past Surgical  History:  Past Surgical History:   Procedure Laterality Date   • CAROTID ENDARTERECTOMY Right 2009    Per Dr Idris Dotson   • PROSTATE SURGERY       Social History:  reports that he quit smoking about 52 years ago. He has never used smokeless tobacco. He reports that he does not drink alcohol and does not use drugs.    Family History: family history includes Cancer in his father; Other in his mother.    Allergies: No Known Allergies  Medications: Scheduled Meds:ipratropium-albuterol, , ,   piperacillin-tazobactam, 2.25 g, Intravenous, Q8H      Continuous Infusions:   PRN Meds:.•  atropine  •  fentanyl  •  flumazenil  •  HYDROmorphone  •  labetalol  •  Morphine  •  Morphine  •  naloxone  •  ondansetron  •  oxyCODONE-acetaminophen    Objective   Objective    Physical Exam:   Temp:  [97.9 °F (36.6 °C)-98.6 °F (37 °C)] 98.2 °F (36.8 °C)  Heart Rate:  [] 80  Resp:  [15-30] 28  BP: ()/(40-69) 102/67  Physical Exam  GEN: Awake, alert, interactive, in NAD  HEENT: PERRLA, EOMI, Anicteric, Trachea midline, +BIpap  Lungs: Very faint basilar rales but fairly clear.  No wheezing.  Heart: RRR, +S1/s2, no rub  ABD: Sluggish bowel sluggish, abdominal bandage in place, drain with serosanguineous fluid.  Extremities: atraumatic, no cyanosis, no pitting edema  Skin: no rashes or petechiae  Neuro: AAOx3, no focal deficits  Psych: normal mood & affect      Results Reviewed:  I have personally reviewed current lab, radiology, and data and agree with results.  Lab Results (last 24 hours)     Procedure Component Value Units Date/Time    BNP [[id number]] Collected: 02/09/22 0830    Specimen: Blood from Hand, Left Updated: 02/09/22 0920    Manual Differential [[id number]]  (Abnormal) Collected: 02/09/22 0830    Specimen: Blood from Hand, Left Updated: 02/09/22 0917     Neutrophil % 51.0 %      Lymphocyte % 22.0 %      Monocyte % 4.0 %      Eosinophil % 1.0 %      Bands %  15.0 %      Metamyelocyte % 5.0 %      Atypical Lymphocyte  % 2.0 %      Neutrophils Absolute 3.29 10*3/mm3      Lymphocytes Absolute 1.20 10*3/mm3      Monocytes Absolute 0.20 10*3/mm3      Eosinophils Absolute 0.05 10*3/mm3      RBC Morphology Normal     WBC Morphology Normal     Clumped Platelets Present     Giant Platelets Large/3+    Basic Metabolic Panel [[id number]]  (Abnormal) Collected: 02/09/22 0830    Specimen: Blood from Hand, Left Updated: 02/09/22 0915     Glucose 112 mg/dL      BUN 39 mg/dL      Creatinine 2.20 mg/dL      Sodium 140 mmol/L      Potassium 5.1 mmol/L      Chloride 104 mmol/L      CO2 24.0 mmol/L      Calcium 8.7 mg/dL      eGFR Non African Amer 28 mL/min/1.73      BUN/Creatinine Ratio 17.7     Anion Gap 12.0 mmol/L     Narrative:      GFR Normal >60  Chronic Kidney Disease <60  Kidney Failure <15      CBC & Differential [[id number]]  (Abnormal) Collected: 02/09/22 0830    Specimen: Blood from Hand, Left Updated: 02/09/22 0905    Narrative:      The following orders were created for panel order CBC & Differential.  Procedure                               Abnormality         Status                     ---------                               -----------         ------                     CBC Auto Differential[[id number]]        Abnormal            Final result                 Please view results for these tests on the individual orders.    CBC Auto Differential [[id number]]  (Abnormal) Collected: 02/09/22 0830    Specimen: Blood from Hand, Left Updated: 02/09/22 0905     WBC 4.98 10*3/mm3      RBC 4.54 10*6/mm3      Hemoglobin 12.7 g/dL      Hematocrit 40.5 %      MCV 89.2 fL      MCH 28.0 pg      MCHC 31.4 g/dL      RDW 13.6 %      RDW-SD 44.7 fl      MPV 9.9 fL      Platelets 284 10*3/mm3     Tissue Pathology Exam [[id number]] Collected: 02/08/22 2253    Specimen: Tissue from Large Intestine, Appendix Updated: 02/09/22 0705    Blood Gas, Arterial - [[id number]]  (Abnormal) Collected: 02/09/22 0200    Specimen: Arterial Blood Updated: 02/09/22 0220      Site Right Radial     Navi's Test Positive     pH, Arterial 7.358 pH units      pCO2, Arterial 41.7 mm Hg      pO2, Arterial 42.5 mm Hg      Comment: 85 Value below critical limit        HCO3, Arterial 23.4 mmol/L      Base Excess, Arterial -2.1 mmol/L      Comment: 84 Value below reference range        O2 Saturation, Arterial 76.3 %      Comment: 84 Value below reference range        Temperature 37.0 C      Barometric Pressure for Blood Gas 748 mmHg      Modality Simple Mask     Flow Rate 10.0 lpm      Ventilator Mode NA     Notified Who LYNSEY LOBATO RN     Notified By [id number]     Notified Time 02/09/2022 02:07     Collected by [id number]     Comment: Meter: [id number]     :  [id number]        pCO2, Temperature Corrected 41.7 mm Hg      pH, Temp Corrected 7.358 pH Units      pO2, Temperature Corrected 42.5 mm Hg     Lactic Acid, Plasma [[id number]]  (Abnormal) Collected: 02/08/22 2157    Specimen: Blood Updated: 02/08/22 2226     Lactate 3.2 mmol/L     Blood Culture - Blood, Arm, Right [[id number]] Collected: 02/08/22 2145    Specimen: Blood from Arm, Right Updated: 02/08/22 2204    Blood Culture - Blood, Arm, Right [[id number]] Collected: 02/08/22 2155    Specimen: Blood from Arm, Right Updated: 02/08/22 2204    COVID PRE-OP / PRE-PROCEDURE SCREENING ORDER (NO ISOLATION) - Swab, Nasal Cavity [[id number]]  (Normal) Collected: 02/08/22 1956    Specimen: Swab from Nasal Cavity Updated: 02/08/22 2048    Narrative:      The following orders were created for panel order COVID PRE-OP / PRE-PROCEDURE SCREENING ORDER (NO ISOLATION) - Swab, Nasal Cavity.  Procedure                               Abnormality         Status                     ---------                               -----------         ------                     COVID-19,[facility] [employer] [facility]...[[id number]]  Normal              Final result                 Please view results for these tests on the individual orders.    COVID-19,[facility] [employer] IN-HOUSE,Nasal Swab No  [employer] 3-4 HR TAT - Swab, Nasal Cavity [[id number]]  (Normal) Collected: 02/08/22 1956    Specimen: Swab from Nasal Cavity Updated: 02/08/22 2048     COVID19 Not Detected    Narrative:      Fact sheet for providers: https://www.fda.gov/media/165504/download     Fact sheet for patients: https://www.fda.gov/media/536946/download    Test performed by PCR.    Consider negative results in combination with clinical observations, patient history, and epidemiological information.    Comprehensive Metabolic Panel [[id number]]  (Abnormal) Collected: 02/08/22 1955    Specimen: Blood Updated: 02/08/22 2022     Glucose 135 mg/dL      BUN 33 mg/dL      Creatinine 2.19 mg/dL      Sodium 142 mmol/L      Potassium 4.7 mmol/L      Chloride 104 mmol/L      CO2 22.0 mmol/L      Calcium 9.8 mg/dL      Total Protein 7.8 g/dL      Albumin 4.30 g/dL      ALT (SGPT) 13 U/L      AST (SGOT) 15 U/L      Alkaline Phosphatase 39 U/L      Total Bilirubin 1.6 mg/dL      eGFR Non African Amer 28 mL/min/1.73      Globulin 3.5 gm/dL      A/G Ratio 1.2 g/dL      BUN/Creatinine Ratio 15.1     Anion Gap 16.0 mmol/L     Narrative:      GFR Normal >60  Chronic Kidney Disease <60  Kidney Failure <15      Lipase [[id number]]  (Normal) Collected: 02/08/22 1955    Specimen: Blood Updated: 02/08/22 2022     Lipase 15 U/L     CBC & Differential [[id number]]  (Abnormal) Collected: 02/08/22 1956    Specimen: Blood Updated: 02/08/22 2004    Narrative:      The following orders were created for panel order CBC & Differential.  Procedure                               Abnormality         Status                     ---------                               -----------         ------                     CBC Auto Differential[[id number]]        Abnormal            Final result                 Please view results for these tests on the individual orders.    CBC Auto Differential [[id number]]  (Abnormal) Collected: 02/08/22 1956    Specimen: Blood Updated: 02/08/22 2004      WBC 19.04 10*3/mm3      RBC 5.18 10*6/mm3      Hemoglobin 14.4 g/dL      Hematocrit 46.0 %      MCV 88.8 fL      MCH 27.8 pg      MCHC 31.3 g/dL      RDW 13.5 %      RDW-SD 43.9 fl      MPV 9.9 fL      Platelets 384 10*3/mm3      Neutrophil % 87.9 %      Lymphocyte % 4.3 %      Monocyte % 7.1 %      Eosinophil % 0.0 %      Basophil % 0.2 %      Immature Grans % 0.5 %      Neutrophils, Absolute 16.74 10*3/mm3      Lymphocytes, Absolute 0.81 10*3/mm3      Monocytes, Absolute 1.35 10*3/mm3      Eosinophils, Absolute 0.00 10*3/mm3      Basophils, Absolute 0.04 10*3/mm3      Immature Grans, Absolute 0.10 10*3/mm3      nRBC 0.0 /100 WBC         Imaging Results (Last 24 Hours)     Procedure Component Value Units Date/Time    XR Chest 1 View [[id number]] Collected: 02/09/22 0746     Updated: 02/09/22 0751    Narrative:      EXAMINATION: XR CHEST 1 VW-     2/9/2022 2:59 AM CST     HISTORY: persistent low Spo2 though pt fully AAOx3; denies pain, SOB,  c/o; K35.80-Unspecified acute appendicitis; K37-Unspecified appendicitis     1 view chest x-ray.     Comparison is made with an abdomen/pelvis CT performed yesterday at 9:08  PM.     Magnified heart size.     Patchy bibasilar infiltrate.     No pneumothorax.     Summary:  1. Patchy bibasilar infiltrate or atelectasis has developed since the CT  performed last night.  This report was finalized on 02/09/2022 07:48 by Dr. Alfredo Muhammad MD.    CT Abdomen Pelvis Without Contrast [[id number]] Collected: 02/08/22 2121     Updated: 02/08/22 2129    Narrative:      EXAMINATION:   CT ABDOMEN PELVIS WO CONTRAST-  2/8/2022 9:21 PM CST     HISTORY: CT ABDOMEN PELVIS WO CONTRAST- 2/8/2022 9:04 PM CST     HISTORY: abdominal pain      COMPARISON: None      DOSE LENGTH PRODUCT: 253 mGy cm. Automated exposure control was also  utilized to decrease patient radiation dose.     TECHNIQUE: Noncontrast enhanced images of the abdomen and pelvis  obtained without oral contrast. Multiplanar reformatted  images were  provided for review.      FINDINGS:   The lung bases and base of the heart are unremarkable.      LIVER: No focal liver lesion.      BILIARY SYSTEM: The gallbladder is unremarkable. No intrahepatic or  extrahepatic ductal dilatation.      PANCREAS: No focal pancreatic lesion.      SPLEEN: Unremarkable.      KIDNEYS AND ADRENALS: Adrenal glands are visualized. Right renal contour  is unremarkable. Nonobstructing calculi are present lower pole of the  left kidney..  The ureters are decompressed and normal in appearance.     RETROPERITONEUM: No mass, lymphadenopathy or hemorrhage.      GI TRACT: No evidence of obstruction or bowel wall thickening. The  appendix is visualized. The appendix is enlarged. The appendix is  edematous. An appendicolith is present. Consistent with acute  appendicitis. The appendix is approximately 2 cm in diameter.  Diverticulosis is present.     .     OTHER: There is no mesenteric mass, lymphadenopathy or fluid collection.  The osseous structures and soft tissues demonstrate no worrisome  lesions. Vascular calcifications present abdominal aorta and iliac  arteries.      PELVIS: No mass lesion, fluid collection or significant lymphadenopathy  is seen in the pelvis. The urinary bladder is normal in appearance. A  prominent calcifications present possibly associated with the prostate.       Impression:      1. Acute appendicitis.        2. Diverticulosis.        3. Vascular calcifications present abdominal aorta.  4. Left nephrolithiasis     These results were called to Dr. JACKSON 9:25 PM.         This report was finalized on 02/08/2022 21:25 by Dr. Yang Chaudhary MD.          Assessment / Plan   Assessment:     Active Hospital Problems    Diagnosis    • **Acute appendicitis    • Acute respiratory failure with hypoxia (HCC)    • Elevated serum creatinine    • Leukocytosis             Plan:   #1 acute appendicitis -status post or last night.  Continue care per surgery.  On Zosyn.    #2  acute respiratory failure hypoxia -suspect pulmonary edema.  Will give Lasix 40 now.  Continue CPAP and wean off.  Check a 2D echo.  Check a BNP.    #3 elevated creatinine -unclear baseline.  Patient has a history of renal insufficiency but he has.  Could be LOY on CKD as well.  He is having no abdominal pain.  Will need to be careful with diuretics.  Not hypotensive.  Follow urine output and daily weights.  Repeat BMP in the morning.  May need renal ultrasound will try to defer a day given recent abdominal surgery.    #4 leukocytosis -possibly early sepsis/inflammation from appendicitis.  Already resolved overnight post surgery.  On Zosyn.  Follow-up blood culture.    I discussed the patient's findings and my recommendations with the patient and his nurse directly at bedside.    Advanced care planning/code status: Full code  The patient's surrogate decision maker is his daughter.     Patient seen and examined by me on 2/9/2022 at 9:35 AM.    Electronically signed by Idris Sandhu DO, 02/09/22, 16:12 CST.

## 2022-02-09 NOTE — PROGRESS NOTES
Milly Valverde MD Progress Note     LOS: 1 day   Patient Care Team:  Philippe Oliveira DO as PCP - General (Family Medicine)        Subjective     Operative findings explained.  Patient's had a bit of a juan jose early postoperative course with initial hypotension requiring some gentle hydration and then some pulmonary edema responding to CPAP and Lasix gently.    Objective     Vital Signs  Temp:  [97.9 °F (36.6 °C)-98.6 °F (37 °C)] 98.2 °F (36.8 °C)  Heart Rate:  [] 79  Resp:  [15-30] 26  BP: ()/(40-69) 97/55    Intake & Output (last 3 days)       02/06 0701 02/07 0700 02/07 0701 02/08 0700 02/08 0701 02/09 0700 02/09 0701  02/10 0700    I.V. (mL/kg)   1000 (13) 300 (3.9)    IV Piggyback   250 50    Total Intake(mL/kg)   1250 (16.2) 350 (4.5)    Urine (mL/kg/hr)   150 175 (0.7)    Drains   150 15    Total Output   300 190    Net   +950 +160                  Physical Exam:     General Appearance:    Alert, cooperative, in no acute distress   Lungs:     respirations regular, even and unlabored    Heart:    Regular rhythm and normal rate, normal S1 and S2, no            murmur, no gallop, no rub   Chest Wall:    No abnormalities observed   Abdomen:      Soft KANG serosanguineous   Extremities: No edema,    Results Review:     I reviewed the patient's new clinical results.    Lab Results (last 72 hours)     Procedure Component Value Units Date/Time    BNP [[id number]]  (Abnormal) Collected: 02/09/22 0830    Specimen: Blood from Hand, Left Updated: 02/09/22 0935     proBNP 1,964.0 pg/mL     Narrative:      Among patients with dyspnea, NT-proBNP is highly sensitive for the detection of acute congestive heart failure. In addition NT-proBNP of <300 pg/ml effectively rules out acute congestive heart failure with 99% negative predictive value.    Results may be falsely decreased if patient taking Biotin.      Manual Differential [[id number]]  (Abnormal) Collected: 02/09/22 0830    Specimen: Blood from Hand, Left  Updated: 02/09/22 0917     Neutrophil % 51.0 %      Lymphocyte % 22.0 %      Monocyte % 4.0 %      Eosinophil % 1.0 %      Bands %  15.0 %      Metamyelocyte % 5.0 %      Atypical Lymphocyte % 2.0 %      Neutrophils Absolute 3.29 10*3/mm3      Lymphocytes Absolute 1.20 10*3/mm3      Monocytes Absolute 0.20 10*3/mm3      Eosinophils Absolute 0.05 10*3/mm3      RBC Morphology Normal     WBC Morphology Normal     Clumped Platelets Present     Giant Platelets Large/3+    Basic Metabolic Panel [[id number]]  (Abnormal) Collected: 02/09/22 0830    Specimen: Blood from Hand, Left Updated: 02/09/22 0915     Glucose 112 mg/dL      BUN 39 mg/dL      Creatinine 2.20 mg/dL      Sodium 140 mmol/L      Potassium 5.1 mmol/L      Chloride 104 mmol/L      CO2 24.0 mmol/L      Calcium 8.7 mg/dL      eGFR Non African Amer 28 mL/min/1.73      BUN/Creatinine Ratio 17.7     Anion Gap 12.0 mmol/L     Narrative:      GFR Normal >60  Chronic Kidney Disease <60  Kidney Failure <15      CBC & Differential [[id number]]  (Abnormal) Collected: 02/09/22 0830    Specimen: Blood from Hand, Left Updated: 02/09/22 0905    Narrative:      The following orders were created for panel order CBC & Differential.  Procedure                               Abnormality         Status                     ---------                               -----------         ------                     CBC Auto Differential[[id number]]        Abnormal            Final result                 Please view results for these tests on the individual orders.    CBC Auto Differential [[id number]]  (Abnormal) Collected: 02/09/22 0830    Specimen: Blood from Hand, Left Updated: 02/09/22 0905     WBC 4.98 10*3/mm3      RBC 4.54 10*6/mm3      Hemoglobin 12.7 g/dL      Hematocrit 40.5 %      MCV 89.2 fL      MCH 28.0 pg      MCHC 31.4 g/dL      RDW 13.6 %      RDW-SD 44.7 fl      MPV 9.9 fL      Platelets 284 10*3/mm3     Tissue Pathology Exam [[id number]] Collected: 02/08/22 7317    Specimen:  Tissue from Large Intestine, Appendix Updated: 02/09/22 0705    Blood Gas, Arterial - [[id number]]  (Abnormal) Collected: 02/09/22 0200    Specimen: Arterial Blood Updated: 02/09/22 0220     Site Right Radial     Navi's Test Positive     pH, Arterial 7.358 pH units      pCO2, Arterial 41.7 mm Hg      pO2, Arterial 42.5 mm Hg      Comment: 85 Value below critical limit        HCO3, Arterial 23.4 mmol/L      Base Excess, Arterial -2.1 mmol/L      Comment: 84 Value below reference range        O2 Saturation, Arterial 76.3 %      Comment: 84 Value below reference range        Temperature 37.0 C      Barometric Pressure for Blood Gas 748 mmHg      Modality Simple Mask     Flow Rate 10.0 lpm      Ventilator Mode NA     Notified Who LYNSEY LOBATO RN     Notified By [id number]     Notified Time 02/09/2022 02:07     Collected by [id number]     Comment: Meter: [id number]     :  [id number]        pCO2, Temperature Corrected 41.7 mm Hg      pH, Temp Corrected 7.358 pH Units      pO2, Temperature Corrected 42.5 mm Hg     Lactic Acid, Plasma [[id number]]  (Abnormal) Collected: 02/08/22 2157    Specimen: Blood Updated: 02/08/22 2226     Lactate 3.2 mmol/L     Blood Culture - Blood, Arm, Right [[id number]] Collected: 02/08/22 2145    Specimen: Blood from Arm, Right Updated: 02/08/22 2204    Blood Culture - Blood, Arm, Right [[id number]] Collected: 02/08/22 2155    Specimen: Blood from Arm, Right Updated: 02/08/22 2204    COVID PRE-OP / PRE-PROCEDURE SCREENING ORDER (NO ISOLATION) - Swab, Nasal Cavity [[id number]]  (Normal) Collected: 02/08/22 1956    Specimen: Swab from Nasal Cavity Updated: 02/08/22 2048    Narrative:      The following orders were created for panel order COVID PRE-OP / PRE-PROCEDURE SCREENING ORDER (NO ISOLATION) - Swab, Nasal Cavity.  Procedure                               Abnormality         Status                     ---------                               -----------         ------                      COVID-19,[facility]...[[id number]]  Normal              Final result                 Please view results for these tests on the individual orders.    COVID-19,[facility] IN-HOUSE,Nasal Swab No Transport Media 3-4 HR TAT - Swab, Nasal Cavity [[id number]]  (Normal) Collected: 02/08/22 1956    Specimen: Swab from Nasal Cavity Updated: 02/08/22 2048     COVID19 Not Detected    Narrative:      Fact sheet for providers: https://www.fda.gov/media/700370/download     Fact sheet for patients: https://www.fda.gov/media/074285/download    Test performed by PCR.    Consider negative results in combination with clinical observations, patient history, and epidemiological information.    Comprehensive Metabolic Panel [[id number]]  (Abnormal) Collected: 02/08/22 1955    Specimen: Blood Updated: 02/08/22 2022     Glucose 135 mg/dL      BUN 33 mg/dL      Creatinine 2.19 mg/dL      Sodium 142 mmol/L      Potassium 4.7 mmol/L      Chloride 104 mmol/L      CO2 22.0 mmol/L      Calcium 9.8 mg/dL      Total Protein 7.8 g/dL      Albumin 4.30 g/dL      ALT (SGPT) 13 U/L      AST (SGOT) 15 U/L      Alkaline Phosphatase 39 U/L      Total Bilirubin 1.6 mg/dL      eGFR Non African Amer 28 mL/min/1.73      Globulin 3.5 gm/dL      A/G Ratio 1.2 g/dL      BUN/Creatinine Ratio 15.1     Anion Gap 16.0 mmol/L     Narrative:      GFR Normal >60  Chronic Kidney Disease <60  Kidney Failure <15      Lipase [[id number]]  (Normal) Collected: 02/08/22 1955    Specimen: Blood Updated: 02/08/22 2022     Lipase 15 U/L     CBC & Differential [[id number]]  (Abnormal) Collected: 02/08/22 1956    Specimen: Blood Updated: 02/08/22 2004    Narrative:      The following orders were created for panel order CBC & Differential.  Procedure                               Abnormality         Status                     ---------                               -----------         ------                     CBC Auto Differential[[id number]]        Abnormal            Final  result                 Please view results for these tests on the individual orders.    CBC Auto Differential [[id number]]  (Abnormal) Collected: 02/08/22 1956    Specimen: Blood Updated: 02/08/22 2004     WBC 19.04 10*3/mm3      RBC 5.18 10*6/mm3      Hemoglobin 14.4 g/dL      Hematocrit 46.0 %      MCV 88.8 fL      MCH 27.8 pg      MCHC 31.3 g/dL      RDW 13.5 %      RDW-SD 43.9 fl      MPV 9.9 fL      Platelets 384 10*3/mm3      Neutrophil % 87.9 %      Lymphocyte % 4.3 %      Monocyte % 7.1 %      Eosinophil % 0.0 %      Basophil % 0.2 %      Immature Grans % 0.5 %      Neutrophils, Absolute 16.74 10*3/mm3      Lymphocytes, Absolute 0.81 10*3/mm3      Monocytes, Absolute 1.35 10*3/mm3      Eosinophils, Absolute 0.00 10*3/mm3      Basophils, Absolute 0.04 10*3/mm3      Immature Grans, Absolute 0.10 10*3/mm3      nRBC 0.0 /100 WBC         Imaging Results (Last 72 Hours)     Procedure Component Value Units Date/Time    XR Chest 1 View [[id number]] Collected: 02/09/22 0746     Updated: 02/09/22 0751    Narrative:      EXAMINATION: XR CHEST 1 VW-     2/9/2022 2:59 AM CST     HISTORY: persistent low Spo2 though pt fully AAOx3; denies pain, SOB,  c/o; K35.80-Unspecified acute appendicitis; K37-Unspecified appendicitis     1 view chest x-ray.     Comparison is made with an abdomen/pelvis CT performed yesterday at 9:08  PM.     Magnified heart size.     Patchy bibasilar infiltrate.     No pneumothorax.     Summary:  1. Patchy bibasilar infiltrate or atelectasis has developed since the CT  performed last night.  This report was finalized on 02/09/2022 07:48 by Dr. Alfredo Muhammad MD.    CT Abdomen Pelvis Without Contrast [[id number]] Collected: 02/08/22 2121     Updated: 02/08/22 2129    Narrative:      EXAMINATION:   CT ABDOMEN PELVIS WO CONTRAST-  2/8/2022 9:21 PM CST     HISTORY: CT ABDOMEN PELVIS WO CONTRAST- 2/8/2022 9:04 PM CST     HISTORY: abdominal pain      COMPARISON: None      DOSE LENGTH PRODUCT: 253 mGy cm.  Automated exposure control was also  utilized to decrease patient radiation dose.     TECHNIQUE: Noncontrast enhanced images of the abdomen and pelvis  obtained without oral contrast. Multiplanar reformatted images were  provided for review.      FINDINGS:   The lung bases and base of the heart are unremarkable.      LIVER: No focal liver lesion.      BILIARY SYSTEM: The gallbladder is unremarkable. No intrahepatic or  extrahepatic ductal dilatation.      PANCREAS: No focal pancreatic lesion.      SPLEEN: Unremarkable.      KIDNEYS AND ADRENALS: Adrenal glands are visualized. Right renal contour  is unremarkable. Nonobstructing calculi are present lower pole of the  left kidney..  The ureters are decompressed and normal in appearance.     RETROPERITONEUM: No mass, lymphadenopathy or hemorrhage.      GI TRACT: No evidence of obstruction or bowel wall thickening. The  appendix is visualized. The appendix is enlarged. The appendix is  edematous. An appendicolith is present. Consistent with acute  appendicitis. The appendix is approximately 2 cm in diameter.  Diverticulosis is present.     .     OTHER: There is no mesenteric mass, lymphadenopathy or fluid collection.  The osseous structures and soft tissues demonstrate no worrisome  lesions. Vascular calcifications present abdominal aorta and iliac  arteries.      PELVIS: No mass lesion, fluid collection or significant lymphadenopathy  is seen in the pelvis. The urinary bladder is normal in appearance. A  prominent calcifications present possibly associated with the prostate.       Impression:      1. Acute appendicitis.        2. Diverticulosis.        3. Vascular calcifications present abdominal aorta.  4. Left nephrolithiasis     These results were called to Dr. JACKSON 9:25 PM.         This report was finalized on 02/08/2022 21:25 by Dr. Yang Chaudhary MD.              Assessment/Plan       Acute appendicitis    Acute respiratory failure with hypoxia (HCC)    Elevated  serum creatinine    Leukocytosis      We will keep Sánchez in for today to monitor urine output.  He is responding decently to the Lasix.  Will consult medicine.  He is still in recovery room now awaiting a unit bed      Milly Valverde MD  02/09/22  10:16 CST

## 2022-02-09 NOTE — PAYOR COMM NOTE
"[facility]  [place],   [phone]  OR  FAX   [phone]      Fabiola Reynolds (89 y.o. Male)             Date of Birth Social Security Number Address Home Phone MRN    09/17/1932  [address] [place] [address]  [id number]    [employer] Marital Status             None        Admission Date Admission Type Admitting Provider Attending Provider Department, Room/Bed    2/8/22 Emergency Milly Valverde MD Tyrrell, Dana R, MD [facility], C012/1    Discharge Date Discharge Disposition Discharge Destination                         Attending Provider: Milly Valverde MD    Allergies: No Known Allergies    Isolation: None   Infection: None   Code Status: Not on file   Advance Care Planning Activity    Ht: 170.2 cm (67\")   Wt: 77.1 kg (170 lb)    Admission Cmt: None   Principal Problem: Acute appendicitis [K35.80]                 Active Insurance as of 2/8/2022     Primary Coverage     Payor Plan Insurance Group Employer/Plan Group    AETNA MEDICARE REPLACEMENT AETNA MEDICARE REPLACEMENT [id number]     Payor Plan Address Payor Plan Phone Number Payor Plan Fax Number Effective Dates    [address] [phone]  1/1/2018 - None Entered    [place] [address]       Subscriber Name Subscriber Birth Date Member ID       FABIOLA REYNOLDS 9/17/1932 [id number]                 Emergency Contacts      (Rel.) Home Phone Work Phone Mobile Phone    rosario escobar (Daughter) -- -- [phone]               History & Physical      Milly Valverde MD at 02/08/22 2221            Milly Valverde MD  H&P    Patient Care Team:  Philippe Oliveira DO as PCP - General (Family Medicine)    Chief complaint appendicitis    Subjective     Fabiola Reynolds  is a 89 y.o. male with right lower quadrant abdominal pain for about 24 hours associated with some nausea and poor appetite.  No fevers no vomiting no diarrhea no constipation no urinary symptoms..      Review of Systems   Pertinent " items are noted in HPI, all other systems reviewed and negative    History  Past Medical History:   Diagnosis Date   • Atherosclerosis    • BPH (benign prostatic hyperplasia)    • Carotid artery disease (HCC)    • High cholesterol    • Hypertension    • Kidney cysts    • Kidney stone    • Renal artery stenosis (HCC)    • Renal artery stenosis (HCC) 2018   • Renal insufficiency    • Solitary lung nodule    • Stroke (HCC)     Residual speech problems.     Past Surgical History:   Procedure Laterality Date   • CAROTID ENDARTERECTOMY Right     Per Dr Idris Dotson   • PROSTATE SURGERY       Family History   Problem Relation Age of Onset   • Other Mother          after cranial surgery   • Cancer Father      Social History     Tobacco Use   • Smoking status: Former Smoker     Quit date: 1970     Years since quittin.1   • Smokeless tobacco: Never Used   Substance Use Topics   • Alcohol use: No   • Drug use: No     Medications Prior to Admission   Medication Sig Dispense Refill Last Dose   • aspirin (ASPIR) 81 MG EC tablet Aspir-81      • atenolol (TENORMIN) 50 MG tablet atenolol 50 mg tablet   Take 1 tablet every day by oral route.      • cholecalciferol (VITAMIN D3) 1000 units tablet Take 1,000 Units by mouth Daily.      • clopidogrel (PLAVIX) 75 MG tablet clopidogrel 75 mg tablet   Take 1 tablet every day by oral route.      • finasteride (PROSCAR) 5 MG tablet finasteride 5 mg tablet   Take 1 tablet every day by oral route.      • gabapentin (NEURONTIN) 300 MG capsule TK 1 C PO BID  3    • pantoprazole (PROTONIX) 40 MG EC tablet Protonix 40 mg tablet,delayed release   Take 1 tablet every day by oral route at bedtime.      • pravastatin (PRAVACHOL) 40 MG tablet pravastatin 40 mg tablet   Take 1 tablet every day by oral route.      • vitamin B-12 (CYANOCOBALAMIN) 1000 MCG tablet Take 1,000 mcg by mouth Daily.        Allergies:  Patient has no known allergies.    Objective     Vital Signs  Temp:  [98.6 °F  (37 °C)] 98.6 °F (37 °C)  Heart Rate:  [] 80  Resp:  [18] 18  BP: ()/(56-65) 92/56    Physical Exam:      General Appearance:    Alert, cooperative, in no acute distress   Head:    Normocephalic, without obvious abnormality, atraumatic   Eyes:            Lids and lashes normal, conjunctivae and sclerae normal, no   icterus, no pallor, corneas clear, PERRLA   Ears:    Ears appear intact with no abnormalities noted   Neck:   No adenopathy, supple, trachea midline   Back:     No kyphosis present, no scoliosis present, no skin lesions,      erythema or scars, no tenderness to percussion or                   palpation,   range of motion normal   Lungs:     Clear to auscultation,respirations regular, even and                  unlabored    Heart:    Regular rhythm and normal rate, normal S1 and S2, no            murmur, no gallop, no rub, no click   Chest Wall:    No abnormalities observed   Abdomen:    Tender right lower quadrant with voluntary guarding   Rectal:     Deferred   Extremities:   Moves all extremities well, no edema, no cyanosis, no             redness   Pulses:   Pulses palpable and equal bilaterally   Skin:   No bleeding, bruising or rash   Lymph nodes:   No palpable adenopathy   Neurologic:   No focal deficits       Results Review:      Lab Results (last 72 hours)     Procedure Component Value Units Date/Time    Blood Culture - Blood, Arm, Right [[id number]] Collected: 02/08/22 2145    Specimen: Blood from Arm, Right Updated: 02/08/22 2204    Blood Culture - Blood, Arm, Right [[id number]] Collected: 02/08/22 2155    Specimen: Blood from Arm, Right Updated: 02/08/22 2204    Lactic Acid, Plasma [[id number]] Collected: 02/08/22 2157    Specimen: Blood Updated: 02/08/22 2202    COVID PRE-OP / PRE-PROCEDURE SCREENING ORDER (NO ISOLATION) - Swab, Nasal Cavity [[id number]]  (Normal) Collected: 02/08/22 1956    Specimen: Swab from Nasal Cavity Updated: 02/08/22 2048    Narrative:      The following orders  were created for panel order COVID PRE-OP / PRE-PROCEDURE SCREENING ORDER (NO ISOLATION) - Swab, Nasal Cavity.  Procedure                               Abnormality         Status                     ---------                               -----------         ------                     COVID-19,[facility]...[[id number]]  Normal              Final result                 Please view results for these tests on the individual orders.    COVID-19,[facility] IN-HOUSE,Nasal Swab No Transport Media 3-4 HR TAT - Swab, Nasal Cavity [[id number]]  (Normal) Collected: 02/08/22 1956    Specimen: Swab from Nasal Cavity Updated: 02/08/22 2048     COVID19 Not Detected    Narrative:      Fact sheet for providers: https://www.fda.gov/media/716113/download     Fact sheet for patients: https://www.fda.gov/media/684203/download    Test performed by PCR.    Consider negative results in combination with clinical observations, patient history, and epidemiological information.    Comprehensive Metabolic Panel [[id number]]  (Abnormal) Collected: 02/08/22 1955    Specimen: Blood Updated: 02/08/22 2022     Glucose 135 mg/dL      BUN 33 mg/dL      Creatinine 2.19 mg/dL      Sodium 142 mmol/L      Potassium 4.7 mmol/L      Chloride 104 mmol/L      CO2 22.0 mmol/L      Calcium 9.8 mg/dL      Total Protein 7.8 g/dL      Albumin 4.30 g/dL      ALT (SGPT) 13 U/L      AST (SGOT) 15 U/L      Alkaline Phosphatase 39 U/L      Total Bilirubin 1.6 mg/dL      eGFR Non African Amer 28 mL/min/1.73      Globulin 3.5 gm/dL      A/G Ratio 1.2 g/dL      BUN/Creatinine Ratio 15.1     Anion Gap 16.0 mmol/L     Narrative:      GFR Normal >60  Chronic Kidney Disease <60  Kidney Failure <15      Lipase [[id number]]  (Normal) Collected: 02/08/22 1955    Specimen: Blood Updated: 02/08/22 2022     Lipase 15 U/L     CBC & Differential [[id number]]  (Abnormal) Collected: 02/08/22 1956    Specimen: Blood Updated: 02/08/22 2004    Narrative:      The following orders were  created for panel order CBC & Differential.  Procedure                               Abnormality         Status                     ---------                               -----------         ------                     CBC Auto Differential[[id number]]        Abnormal            Final result                 Please view results for these tests on the individual orders.    CBC Auto Differential [[id number]]  (Abnormal) Collected: 02/08/22 1956    Specimen: Blood Updated: 02/08/22 2004     WBC 19.04 10*3/mm3      RBC 5.18 10*6/mm3      Hemoglobin 14.4 g/dL      Hematocrit 46.0 %      MCV 88.8 fL      MCH 27.8 pg      MCHC 31.3 g/dL      RDW 13.5 %      RDW-SD 43.9 fl      MPV 9.9 fL      Platelets 384 10*3/mm3      Neutrophil % 87.9 %      Lymphocyte % 4.3 %      Monocyte % 7.1 %      Eosinophil % 0.0 %      Basophil % 0.2 %      Immature Grans % 0.5 %      Neutrophils, Absolute 16.74 10*3/mm3      Lymphocytes, Absolute 0.81 10*3/mm3      Monocytes, Absolute 1.35 10*3/mm3      Eosinophils, Absolute 0.00 10*3/mm3      Basophils, Absolute 0.04 10*3/mm3      Immature Grans, Absolute 0.10 10*3/mm3      nRBC 0.0 /100 WBC         Imaging Results (Last 72 Hours)     Procedure Component Value Units Date/Time    CT Abdomen Pelvis Without Contrast [[id number]] Collected: 02/08/22 2121     Updated: 02/08/22 2129    Narrative:      EXAMINATION:   CT ABDOMEN PELVIS WO CONTRAST-  2/8/2022 9:21 PM CST     HISTORY: CT ABDOMEN PELVIS WO CONTRAST- 2/8/2022 9:04 PM CST     HISTORY: abdominal pain      COMPARISON: None      DOSE LENGTH PRODUCT: 253 mGy cm. Automated exposure control was also  utilized to decrease patient radiation dose.     TECHNIQUE: Noncontrast enhanced images of the abdomen and pelvis  obtained without oral contrast. Multiplanar reformatted images were  provided for review.      FINDINGS:   The lung bases and base of the heart are unremarkable.      LIVER: No focal liver lesion.      BILIARY SYSTEM: The gallbladder is  unremarkable. No intrahepatic or  extrahepatic ductal dilatation.      PANCREAS: No focal pancreatic lesion.      SPLEEN: Unremarkable.      KIDNEYS AND ADRENALS: Adrenal glands are visualized. Right renal contour  is unremarkable. Nonobstructing calculi are present lower pole of the  left kidney..  The ureters are decompressed and normal in appearance.     RETROPERITONEUM: No mass, lymphadenopathy or hemorrhage.      GI TRACT: No evidence of obstruction or bowel wall thickening. The  appendix is visualized. The appendix is enlarged. The appendix is  edematous. An appendicolith is present. Consistent with acute  appendicitis. The appendix is approximately 2 cm in diameter.  Diverticulosis is present.     .     OTHER: There is no mesenteric mass, lymphadenopathy or fluid collection.  The osseous structures and soft tissues demonstrate no worrisome  lesions. Vascular calcifications present abdominal aorta and iliac  arteries.      PELVIS: No mass lesion, fluid collection or significant lymphadenopathy  is seen in the pelvis. The urinary bladder is normal in appearance. A  prominent calcifications present possibly associated with the prostate.       Impression:      1. Acute appendicitis.        2. Diverticulosis.        3. Vascular calcifications present abdominal aorta.  4. Left nephrolithiasis     These results were called to Dr. JACKOSN 9:25 PM.         This report was finalized on 02/08/2022 21:25 by Dr. Yang Chaudhary MD.          Assessment/Plan       * No active hospital problems. *      We will proceed with laparoscopic appendectomy.  Risks of bleeding infection injury as well as risks in general 49-year-old undergoing emergency surgery all were discussed with the patient and his daughter who is at the bedside.  In addition he is have some acute kidney injury with a creatinine of 2.1.  He has not been drinking today so most likely the combination of chronic kidney disease and prerenal dehydration.  We will  tavon Valverde MD  22  22:21 CST          Electronically signed by Milly Valverde MD at 22 2227         Facility-Administered Medications as of 2022   Medication Dose Route Frequency Provider Last Rate Last Admin   • enoxaparin (LOVENOX) syringe 30 mg  30 mg Subcutaneous Daily Milly Valverde MD   30 mg at 22 1032   • [COMPLETED] furosemide (LASIX) injection 10 mg  10 mg Intravenous Once Magda Craft MD   10 mg at 22 0645   • [COMPLETED] furosemide (LASIX) injection 40 mg  40 mg Intravenous Once Idris Sandhu DO   40 mg at 22 1032   • [COMPLETED] HYDROmorphone (DILAUDID) injection 0.5 mg  0.5 mg Intravenous Once Karen Lowe MD   0.5 mg at 22   • [] ipratropium-albuterol (DUO-NEB) 0.5-2.5 mg/3 ml nebulizer solution  - ADS Override Pull            • [COMPLETED] ipratropium-albuterol (DUO-NEB) nebulizer solution 3 mL  3 mL Nebulization Once Ashwin Crockett CRNA   3 mL at 22 0116   • [COMPLETED] lactated ringers bolus 500 mL  500 mL Intravenous Once Karen Lowe MD 1,000 mL/hr at 22 500 mL at 22   • morphine injection 4 mg  4 mg Intravenous Q3H PRN Milly Valverde MD       • Morphine sulfate (PF) injection 2 mg  2 mg Intravenous Q3H PRN Milly Valverde MD       • [COMPLETED] ondansetron (ZOFRAN) injection 4 mg  4 mg Intravenous Once Karen Lowe MD   4 mg at 22   • [] ondansetron (ZOFRAN) injection 4 mg  4 mg Intravenous PRN Ashwin Crockett CRNA       • ondansetron (ZOFRAN) injection 4 mg  4 mg Intravenous Q6H PRN Milly Valverde MD       • [COMPLETED] perflutren (DEFINITY) lipid microspheres injection 1.1736 mg  1.1736 mg Intravenous Once Idris Sandhu DO   1.1736 mg at 22 1020   • [COMPLETED] piperacillin-tazobactam (ZOSYN) 3.375 g in iso-osmotic dextrose 50 ml (premix)  3.375 g Intravenous Once Karen Lowe MD   3.375 g at 22 2221   •  piperacillin-tazobactam (ZOSYN) in iso-osmotic dextrose IVPB 2.25 g (premix)  2.25 g Intravenous Q8H Milly Valverde MD   2.25 g at 02/09/22 0839       Orders (last 48 hrs)      Start     Ordered    02/09/22 1022  perflutren (DEFINITY) lipid microspheres injection 1.1736 mg  Once         02/09/22 1020    02/09/22 1006  enoxaparin (LOVENOX) syringe 30 mg  Daily         02/09/22 1004    02/09/22 0954  furosemide (LASIX) injection 40 mg  Once         02/09/22 0952    02/09/22 0919  Adult Transthoracic Echo Complete W/ Cont if Necessary Per Protocol  Once         02/09/22 0918    02/09/22 0917  BNP  Once         02/09/22 0918    02/09/22 0904  Inpatient Hospitalist Consult  Once        Specialty:  Hospitalist  Provider:  Idris Sandhu DO    02/09/22 0904    02/09/22 0840  Manual Differential  Once         02/09/22 0839    02/09/22 0812  Transfer Patient  Once         02/09/22 0812    02/09/22 0749  piperacillin-tazobactam (ZOSYN) in iso-osmotic dextrose IVPB 2.25 g (premix)  Every 8 Hours         02/09/22 0747    02/09/22 0650  NIPPV (CPAP or BIPAP)  Until Discontinued        Comments: RT to manage    02/09/22 0650    02/09/22 0641  furosemide (LASIX) injection 10 mg  Once         02/09/22 0639    02/09/22 0638  furosemide (LASIX) injection 20 mg  Once,   Status:  Discontinued         02/09/22 0636    02/09/22 0600  Basic Metabolic Panel  Daily       02/08/22 2327    02/09/22 0600  CBC & Differential  Daily       02/08/22 2327    02/09/22 0600  CBC Auto Differential  PROCEDURE ONCE         02/08/22 2327    02/09/22 0248  XR Chest 1 View  1 Time Imaging         02/09/22 0244    02/09/22 0243  XR Chest 2 View  1 Time Imaging,   Status:  Canceled         02/09/22 0244    02/09/22 0221  Blood Gas, Arterial -  PROCEDURE ONCE         02/09/22 0200    02/09/22 0202  Blood Gas, Arterial -  Once         02/09/22 0220    02/09/22 0116  ipratropium-albuterol (DUO-NEB) nebulizer solution 3 mL  Once         02/09/22 0112     02/09/22 0115  ipratropium-albuterol (DUO-NEB) 0.5-2.5 mg/3 ml nebulizer solution  - ADS Override Pull        Note to Pharmacy: Created by cabinet override    02/09/22 0115    02/09/22 0057  STAT Lactic Acid, Reflex  PROCEDURE ONCE         02/08/22 2226 02/08/22 2331  Vital signs every 5 minutes for 15 minutes, every 15 minutes thereafter.  Once,   Status:  Canceled         02/08/22 2330 02/08/22 2331  Call Anesthesiologist for additional IV Fluid bolus for Hypotension/Tachycardia  Continuous,   Status:  Canceled         02/08/22 2330 02/08/22 2331  Notify Anesthesia of Any Acute Changes in Patient Condition  Until Discontinued,   Status:  Canceled         02/08/22 2330 02/08/22 2331  Notify Anesthesia for Unrelieved Pain  Until Discontinued,   Status:  Canceled         02/08/22 2330 02/08/22 2331  POC Glucose STAT  STAT,   Status:  Canceled        Comments: Post op Glucose Check on All Diabetic Patients, Notify Anesthesia if Blood Sugar is Less Than 80 mg/dL or Greater Than 250mg/dL      02/08/22 2330 02/08/22 2331  Once DC criteria to floor met, follow surgeon's orders.  Until Discontinued,   Status:  Canceled         02/08/22 2330 02/08/22 2331  Discharge patient from PACU when discharge criteria is met.  Until Discontinued,   Status:  Canceled         02/08/22 2330 02/08/22 2330  Apply warming blanket  As Needed,   Status:  Canceled      Comments: For a recorded temp of <36.9 C    02/08/22 2330 02/08/22 2330  oxyCODONE-acetaminophen (PERCOCET)  MG per tablet 1 tablet  Once As Needed,   Status:  Discontinued         02/08/22 2330 02/08/22 2330  HYDROmorphone (DILAUDID) injection 0.5 mg  Every 10 Minutes PRN,   Status:  Discontinued         02/08/22 2330 02/08/22 2330  fentaNYL citrate (PF) (SUBLIMAZE) injection 25 mcg  Every 5 Minutes PRN,   Status:  Discontinued         02/08/22 2330 02/08/22 2330  labetalol (NORMODYNE,TRANDATE) injection 5 mg  Every 5 Minutes PRN,    "Status:  Discontinued         02/08/22 2330 02/08/22 2330  atropine sulfate injection 0.5 mg  Once As Needed,   Status:  Discontinued         02/08/22 2330 02/08/22 2330  naloxone (NARCAN) injection 0.04 mg  As Needed,   Status:  Discontinued         02/08/22 2330 02/08/22 2330  flumazenil (ROMAZICON) injection 0.2 mg  As Needed,   Status:  Discontinued         02/08/22 2330 02/08/22 2330  ondansetron (ZOFRAN) injection 4 mg  As Needed         02/08/22 2330 02/08/22 2329  dextrose 5 % and sodium chloride 0.9 % infusion  Continuous,   Status:  Discontinued         02/08/22 2327 02/08/22 2326  Morphine sulfate (PF) injection 2 mg  Every 3 Hours PRN         02/08/22 2327 02/08/22 2326  morphine injection 4 mg  Every 3 Hours PRN         02/08/22 2327 02/08/22 2326  ondansetron (ZOFRAN) injection 4 mg  Every 6 Hours PRN         02/08/22 2327    02/08/22 2326  Place Sequential Compression Device  Once         02/08/22 2327 02/08/22 2326  Maintain Sequential Compression Device  Continuous         02/08/22 2327 02/08/22 2326  Continue Indwelling Urinary Catheter  Once        \"And\" Linked Group Details    02/08/22 2327 02/08/22 2326  Assess Need for Indwelling Urinary Catheter - Follow Removal Protocol  Continuous        Comments: Indwelling Urinary Catheter Removal Criteria  Discontinue Indwelling Urinary Catheter Unless One of the Following is Present:  Urinary Retention or Obstruction  Chronic Urinary Catheter Use  End of Life  Critical Illness with Strict I/O   Tract or Abdominal Surgery  Stage 3/4 Sacral / Perineal Wound  Required Activity Restriction: Trauma  Required Activity Restriction: Spine Surgery  If Patient is Being Followed by Urology Contact Them PRIOR to Removal  Do Not Remove Indwelling Urinary Catheter Order is Present with a CLINICAL REASON to Maintain the Catheter. Provider is Required to Include a Clinical Reason to Maintain a Urinary Catheter    Patient Admitted With " "Indwelling Urinary Catheter (Not Placed at [facility])  Assess for Continued Need & Document Medical Necessity  If Infection is Suspected, Contact the Provider       \"And\" Linked Group Details    02/08/22 2327 02/08/22 2326  Urinary Catheter Care  Every Shift      \"And\" Linked Group Details    02/08/22 2327 02/08/22 2325  Inpatient Admission  Once         02/08/22 2327 02/08/22 2258  sodium chloride (NS) irrigation solution  As Needed,   Status:  Discontinued         02/08/22 2258 02/08/22 2255  Tissue Pathology Exam  RELEASE UPON ORDERING         02/08/22 2255 02/08/22 2244  Insert Indwelling Urinary Catheter  Once,   Status:  Canceled         02/08/22 2243 02/08/22 2139  piperacillin-tazobactam (ZOSYN) 3.375 g in iso-osmotic dextrose 50 ml (premix)  Once         02/08/22 2138 02/08/22 2139  Lactic Acid, Plasma  Once         02/08/22 2138 02/08/22 2139  Blood Culture - Blood, Arm, Right  Once        \"And\" Linked Group Details    02/08/22 2138 02/08/22 2139  Blood Culture - Blood, Arm, Right  Once        \"And\" Linked Group Details    02/08/22 2138 02/08/22 2139  NPO Diet  Diet Effective Now         02/08/22 2138 02/08/22 2104  CT Abdomen Pelvis Without Contrast  1 Time Imaging         02/08/22 1919 02/08/22 1921  lactated ringers bolus 500 mL  Once         02/08/22 1919 02/08/22 1921  HYDROmorphone (DILAUDID) injection 0.5 mg  Once         02/08/22 1919 02/08/22 1921  ondansetron (ZOFRAN) injection 4 mg  Once         02/08/22 1919 02/08/22 1920  Urinalysis With Microscopic If Indicated (No Culture) - Urine, Clean Catch  Once         02/08/22 1919 02/08/22 1920  Lipase  Once         02/08/22 1919 02/08/22 1920  CT Abdomen Pelvis With Contrast  1 Time Imaging,   Status:  Canceled         02/08/22 1919    02/08/22 1920  ECG 12 Lead  Once         02/08/22 1919 02/08/22 1919  CBC & Differential  Once         02/08/22 1919 02/08/22 1919  Comprehensive " Metabolic Panel  Once         02/08/22 1919 02/08/22 1919  COVID PRE-OP / PRE-PROCEDURE SCREENING ORDER (NO ISOLATION) - Swab, Nasal Cavity  Once         02/08/22 1919 02/08/22 1919  CBC Auto Differential  PROCEDURE ONCE         02/08/22 1919 02/08/22 1919  COVID-19,[facility] IN-HOUSE,Nasal Swab No Transport Media 3-4 HR TAT - Swab, Nasal Cavity  PROCEDURE ONCE         02/08/22 1919    Signed and Held  Vital Signs - Per Anesthesia Protocol  As Needed         Signed and Held    Signed and Held  Pulse Oximetry, Continuous  Continuous         Signed and Held    Signed and Held  Insert Peripheral IV  Once         Signed and Held    Signed and Held  Saline Lock & Maintain IV Access  Continuous         Signed and Held    Signed and Held  sodium chloride 0.9 % flush 10 mL  Every 12 Hours Scheduled         Signed and Held    Signed and Held  sodium chloride 0.9 % flush 10 mL  As Needed         Signed and Held    Signed and Held  sodium chloride 0.9 % infusion  Continuous         Signed and Held    Signed and Held  Oxygen Therapy- Blow by - Humidified; Titrate for SPO2: equal to or greater than, per policy, 92%  Continuous         Signed and Held    Signed and Held  Pulse Oximetry, Continuous  Continuous         Signed and Held    Signed and Held  POC Glucose PRN  As Needed        Comments: Only on Diabetic patients      Signed and Held    Signed and Held  Assess Need for Indwelling Urinary Catheter - Follow Removal Protocol  Continuous        Comments: Indwelling Urinary Catheter Removal Criteria  Discontinue Indwelling Urinary Catheter Unless One of the Following is Present:  Urinary Retention or Obstruction  Chronic Urinary Catheter Use  End of Life  Critical Illness with Strict I/O   Tract or Abdominal Surgery  Stage 3/4 Sacral / Perineal Wound  Required Activity Restriction: Trauma  Required Activity Restriction: Spine Surgery  If Patient is Being Followed by Urology Contact Them PRIOR to Removal  Do Not  Remove Indwelling Urinary Catheter Order is Present with a CLINICAL REASON to Maintain the Catheter. Provider is Required to Include a Clinical Reason to Maintain a Urinary Catheter    Patient Admitted With Indwelling Urinary Catheter (Not Placed at [facility])  Assess for Continued Need & Document Medical Necessity  If Infection is Suspected, Contact the Provider        Signed and Held    Signed and Held  Urinary Catheter Care  Every Shift       Signed and Held    Signed and Held  Diet Clear Liquid  Diet Effective Now         Signed and Held    --  SCANNED EKG         02/08/22 0000                 Operative/Procedure Notes (last 48 hours)      Milly Valverde MD at 02/08/22 2235          Milly Valverde MD Operative Note    Kj Cerrato  2/8/2022    Pre-op Diagnosis:   * No pre-op diagnosis entered *    Post-op Diagnosis:     same    Procedure/CPT® Codes:      Procedure(s):  APPENDECTOMY LAPAROSCOPIC    Surgeon(s):  Milly Valverde MD    Anesthesia: General    Staff:   Circulator: Ivonne Baires RN; Sandeep Yoo RN  Scrub Person: Gabi Carbajal; Lauren Ruth; Jennifer Pagan; Claire Palmer    Estimated Blood Loss: minimal    Specimens:                Specimens     ID Source Type Tests Collected By Collected At Frozen?    A Large Intestine, Appendix Tissue · TISSUE PATHOLOGY EXAM   Milly Valverde MD 2/8/22 3058 No    This specimen was not marked as sent.            Drains:   Closed/Suction Drain Superior Abdomen Bulb 15 Fr. (Active)       Urethral Catheter Double-lumen 16 Fr. (Active)       Indications: Appendicitis    Findings: Ruptured appendicitis with peritonitis.  A lot of fibrinous exudate in the right lower quadrant and purulent ascites in the right lower quadrant and pelvis.  Also some cloudy peritoneal fluid up subhepatic    Complications: none    Procedure: The patient was brought to the operating room and placed in the supine position.  After induction of general anesthesia and  infusion of IV antibiotics, the patient was prepped and draped in the usual sterile fashion.  Veress needle technique was used through a supraumbilical 12 mm incision and the abdomen was insufflated to a pressure of 15 mmHg.  2 other 5 mm ports were placed in the lower abdomen.  The abdomen was inspected.  There was a lot of fibrinous exudate and edema in the right lower quadrant.  Breaking up these inflammatory adhesions revealed a appendix that had ruptured with several areas of necrosis.  A lot of purulent ascitic fluid in the right lower quadrant that was suctioned in the right paracolic gutter and down in the pelvis.  This was all suctioned and irrigated.  The appendix was very edematous.  It was friable and poor while grasping.  The mesoappendix was very edematous.  I freed it at the base.  One of the areas of perforation was very close to the base of the cecum.  We freed the window in the mesoappendix with cautery and then divided the mesoappendix with the stapling device.  I then lifted up the appendix to try to get down as far down onto the base the cecum is possible.  I then divided with the CAIO.  The staple line appeared intact but there was a lot of edema and fibrinous exudate around the base of the cecum.  The appendix was then placed into a bag removed through the umbilical port along with the appendicolith.  Copious irrigation with 2 L was performed around the liver and the right paracolic gutter appendiceal fossa and pelvis.  There is also intraloop irrigation performed as well.  I placed the drain through the suprapubic port site and laid into the operative bed and sutured in place with 2-0 silk.  The abdomen was desufflated ports were removed.  Fascia was closed with 0 Vicryl.  Wound closed with 4-0 Vicryl subcuticular.  Dressings placed patient awakened and transferred to the cover room in guarded condition having tolerated procedure well.  He did have some hypotension along with probably prerenal  dehydration.  Sánchez catheter was left in place.    Milly Valverde MD     Date: 2/8/2022  Time: 23:29 CST      Electronically signed by Milly Valverde MD at 02/08/22 2337          Physician Progress Notes (last 24 hours)      Milly Valverde MD at 02/09/22 1016          Milly Valverde MD Progress Note     LOS: 1 day   Patient Care Team:  Philippe Oliveira DO as PCP - General (Family Medicine)        Subjective     Operative findings explained.  Patient's had a bit of a juan jose early postoperative course with initial hypotension requiring some gentle hydration and then some pulmonary edema responding to CPAP and Lasix gently.    Objective     Vital Signs  Temp:  [97.9 °F (36.6 °C)-98.6 °F (37 °C)] 98.2 °F (36.8 °C)  Heart Rate:  [] 79  Resp:  [15-30] 26  BP: ()/(40-69) 97/55    Intake & Output (last 3 days)       02/06 0701  02/07 0700 02/07 0701  02/08 0700 02/08 0701  02/09 0700 02/09 0701  02/10 0700    I.V. (mL/kg)   1000 (13) 300 (3.9)    IV Piggyback   250 50    Total Intake(mL/kg)   1250 (16.2) 350 (4.5)    Urine (mL/kg/hr)   150 175 (0.7)    Drains   150 15    Total Output   300 190    Net   +950 +160                  Physical Exam:     General Appearance:    Alert, cooperative, in no acute distress   Lungs:     respirations regular, even and unlabored    Heart:    Regular rhythm and normal rate, normal S1 and S2, no            murmur, no gallop, no rub   Chest Wall:    No abnormalities observed   Abdomen:      Soft KANG serosanguineous   Extremities: No edema,    Results Review:     I reviewed the patient's new clinical results.    Lab Results (last 72 hours)     Procedure Component Value Units Date/Time    BNP [[id number]]  (Abnormal) Collected: 02/09/22 0830    Specimen: Blood from Hand, Left Updated: 02/09/22 0935     proBNP 1,964.0 pg/mL     Narrative:      Among patients with dyspnea, NT-proBNP is highly sensitive for the detection of acute congestive heart failure. In addition NT-proBNP  of <300 pg/ml effectively rules out acute congestive heart failure with 99% negative predictive value.    Results may be falsely decreased if patient taking Biotin.      Manual Differential [[id number]]  (Abnormal) Collected: 02/09/22 0830    Specimen: Blood from Hand, Left Updated: 02/09/22 0917     Neutrophil % 51.0 %      Lymphocyte % 22.0 %      Monocyte % 4.0 %      Eosinophil % 1.0 %      Bands %  15.0 %      Metamyelocyte % 5.0 %      Atypical Lymphocyte % 2.0 %      Neutrophils Absolute 3.29 10*3/mm3      Lymphocytes Absolute 1.20 10*3/mm3      Monocytes Absolute 0.20 10*3/mm3      Eosinophils Absolute 0.05 10*3/mm3      RBC Morphology Normal     WBC Morphology Normal     Clumped Platelets Present     Giant Platelets Large/3+    Basic Metabolic Panel [[id number]]  (Abnormal) Collected: 02/09/22 0830    Specimen: Blood from Hand, Left Updated: 02/09/22 0915     Glucose 112 mg/dL      BUN 39 mg/dL      Creatinine 2.20 mg/dL      Sodium 140 mmol/L      Potassium 5.1 mmol/L      Chloride 104 mmol/L      CO2 24.0 mmol/L      Calcium 8.7 mg/dL      eGFR Non African Amer 28 mL/min/1.73      BUN/Creatinine Ratio 17.7     Anion Gap 12.0 mmol/L     Narrative:      GFR Normal >60  Chronic Kidney Disease <60  Kidney Failure <15      CBC & Differential [[id number]]  (Abnormal) Collected: 02/09/22 0830    Specimen: Blood from Hand, Left Updated: 02/09/22 0905    Narrative:      The following orders were created for panel order CBC & Differential.  Procedure                               Abnormality         Status                     ---------                               -----------         ------                     CBC Auto Differential[[id number]]        Abnormal            Final result                 Please view results for these tests on the individual orders.    CBC Auto Differential [[id number]]  (Abnormal) Collected: 02/09/22 0830    Specimen: Blood from Hand, Left Updated: 02/09/22 0905     WBC 4.98 10*3/mm3       RBC 4.54 10*6/mm3      Hemoglobin 12.7 g/dL      Hematocrit 40.5 %      MCV 89.2 fL      MCH 28.0 pg      MCHC 31.4 g/dL      RDW 13.6 %      RDW-SD 44.7 fl      MPV 9.9 fL      Platelets 284 10*3/mm3     Tissue Pathology Exam [[id number]] Collected: 02/08/22 2253    Specimen: Tissue from Large Intestine, Appendix Updated: 02/09/22 0705    Blood Gas, Arterial - [[id number]]  (Abnormal) Collected: 02/09/22 0200    Specimen: Arterial Blood Updated: 02/09/22 0220     Site Right Radial     Navi's Test Positive     pH, Arterial 7.358 pH units      pCO2, Arterial 41.7 mm Hg      pO2, Arterial 42.5 mm Hg      Comment: 85 Value below critical limit        HCO3, Arterial 23.4 mmol/L      Base Excess, Arterial -2.1 mmol/L      Comment: 84 Value below reference range        O2 Saturation, Arterial 76.3 %      Comment: 84 Value below reference range        Temperature 37.0 C      Barometric Pressure for Blood Gas 748 mmHg      Modality Simple Mask     Flow Rate 10.0 lpm      Ventilator Mode NA     Notified Who LYNSEY LOBATO RN     Notified By [id number]     Notified Time 02/09/2022 02:07     Collected by [id number]     Comment: Meter: [id number]     :  [id number]        pCO2, Temperature Corrected 41.7 mm Hg      pH, Temp Corrected 7.358 pH Units      pO2, Temperature Corrected 42.5 mm Hg     Lactic Acid, Plasma [[id number]]  (Abnormal) Collected: 02/08/22 2157    Specimen: Blood Updated: 02/08/22 2226     Lactate 3.2 mmol/L     Blood Culture - Blood, Arm, Right [[id number]] Collected: 02/08/22 2145    Specimen: Blood from Arm, Right Updated: 02/08/22 2204    Blood Culture - Blood, Arm, Right [[id number]] Collected: 02/08/22 2155    Specimen: Blood from Arm, Right Updated: 02/08/22 2204    COVID PRE-OP / PRE-PROCEDURE SCREENING ORDER (NO ISOLATION) - Swab, Nasal Cavity [[id number]]  (Normal) Collected: 02/08/22 1956    Specimen: Swab from Nasal Cavity Updated: 02/08/22 2048    Narrative:      The following orders were created for  panel order COVID PRE-OP / PRE-PROCEDURE SCREENING ORDER (NO ISOLATION) - Swab, Nasal Cavity.  Procedure                               Abnormality         Status                     ---------                               -----------         ------                     COVID-19,[facility]...[[id number]]  Normal              Final result                 Please view results for these tests on the individual orders.    COVID-19,[facility] [employer] IN-HOUSE,Nasal Swab No Transport Media 3-4 HR TAT - Swab, Nasal Cavity [[id number]]  (Normal) Collected: 02/08/22 1956    Specimen: Swab from Nasal Cavity Updated: 02/08/22 2048     COVID19 Not Detected    Narrative:      Fact sheet for providers: https://www.fda.gov/media/498872/download     Fact sheet for patients: https://www.fda.gov/media/661837/download    Test performed by PCR.    Consider negative results in combination with clinical observations, patient history, and epidemiological information.    Comprehensive Metabolic Panel [[id number]]  (Abnormal) Collected: 02/08/22 1955    Specimen: Blood Updated: 02/08/22 2022     Glucose 135 mg/dL      BUN 33 mg/dL      Creatinine 2.19 mg/dL      Sodium 142 mmol/L      Potassium 4.7 mmol/L      Chloride 104 mmol/L      CO2 22.0 mmol/L      Calcium 9.8 mg/dL      Total Protein 7.8 g/dL      Albumin 4.30 g/dL      ALT (SGPT) 13 U/L      AST (SGOT) 15 U/L      Alkaline Phosphatase 39 U/L      Total Bilirubin 1.6 mg/dL      eGFR Non African Amer 28 mL/min/1.73      Globulin 3.5 gm/dL      A/G Ratio 1.2 g/dL      BUN/Creatinine Ratio 15.1     Anion Gap 16.0 mmol/L     Narrative:      GFR Normal >60  Chronic Kidney Disease <60  Kidney Failure <15      Lipase [[id number]]  (Normal) Collected: 02/08/22 1955    Specimen: Blood Updated: 02/08/22 2022     Lipase 15 U/L     CBC & Differential [[id number]]  (Abnormal) Collected: 02/08/22 1956    Specimen: Blood Updated: 02/08/22 2004    Narrative:      The following orders were created for panel  order CBC & Differential.  Procedure                               Abnormality         Status                     ---------                               -----------         ------                     CBC Auto Differential[[id number]]        Abnormal            Final result                 Please view results for these tests on the individual orders.    CBC Auto Differential [[id number]]  (Abnormal) Collected: 02/08/22 1956    Specimen: Blood Updated: 02/08/22 2004     WBC 19.04 10*3/mm3      RBC 5.18 10*6/mm3      Hemoglobin 14.4 g/dL      Hematocrit 46.0 %      MCV 88.8 fL      MCH 27.8 pg      MCHC 31.3 g/dL      RDW 13.5 %      RDW-SD 43.9 fl      MPV 9.9 fL      Platelets 384 10*3/mm3      Neutrophil % 87.9 %      Lymphocyte % 4.3 %      Monocyte % 7.1 %      Eosinophil % 0.0 %      Basophil % 0.2 %      Immature Grans % 0.5 %      Neutrophils, Absolute 16.74 10*3/mm3      Lymphocytes, Absolute 0.81 10*3/mm3      Monocytes, Absolute 1.35 10*3/mm3      Eosinophils, Absolute 0.00 10*3/mm3      Basophils, Absolute 0.04 10*3/mm3      Immature Grans, Absolute 0.10 10*3/mm3      nRBC 0.0 /100 WBC         Imaging Results (Last 72 Hours)     Procedure Component Value Units Date/Time    XR Chest 1 View [[id number]] Collected: 02/09/22 0746     Updated: 02/09/22 0751    Narrative:      EXAMINATION: XR CHEST 1 VW-     2/9/2022 2:59 AM CST     HISTORY: persistent low Spo2 though pt fully AAOx3; denies pain, SOB,  c/o; K35.80-Unspecified acute appendicitis; K37-Unspecified appendicitis     1 view chest x-ray.     Comparison is made with an abdomen/pelvis CT performed yesterday at 9:08  PM.     Magnified heart size.     Patchy bibasilar infiltrate.     No pneumothorax.     Summary:  1. Patchy bibasilar infiltrate or atelectasis has developed since the CT  performed last night.  This report was finalized on 02/09/2022 07:48 by Dr. Alfredo Muhammad MD.    CT Abdomen Pelvis Without Contrast [[id number]] Collected: 02/08/22 2121      Updated: 02/08/22 2129    Narrative:      EXAMINATION:   CT ABDOMEN PELVIS WO CONTRAST-  2/8/2022 9:21 PM CST     HISTORY: CT ABDOMEN PELVIS WO CONTRAST- 2/8/2022 9:04 PM CST     HISTORY: abdominal pain      COMPARISON: None      DOSE LENGTH PRODUCT: 253 mGy cm. Automated exposure control was also  utilized to decrease patient radiation dose.     TECHNIQUE: Noncontrast enhanced images of the abdomen and pelvis  obtained without oral contrast. Multiplanar reformatted images were  provided for review.      FINDINGS:   The lung bases and base of the heart are unremarkable.      LIVER: No focal liver lesion.      BILIARY SYSTEM: The gallbladder is unremarkable. No intrahepatic or  extrahepatic ductal dilatation.      PANCREAS: No focal pancreatic lesion.      SPLEEN: Unremarkable.      KIDNEYS AND ADRENALS: Adrenal glands are visualized. Right renal contour  is unremarkable. Nonobstructing calculi are present lower pole of the  left kidney..  The ureters are decompressed and normal in appearance.     RETROPERITONEUM: No mass, lymphadenopathy or hemorrhage.      GI TRACT: No evidence of obstruction or bowel wall thickening. The  appendix is visualized. The appendix is enlarged. The appendix is  edematous. An appendicolith is present. Consistent with acute  appendicitis. The appendix is approximately 2 cm in diameter.  Diverticulosis is present.     .     OTHER: There is no mesenteric mass, lymphadenopathy or fluid collection.  The osseous structures and soft tissues demonstrate no worrisome  lesions. Vascular calcifications present abdominal aorta and iliac  arteries.      PELVIS: No mass lesion, fluid collection or significant lymphadenopathy  is seen in the pelvis. The urinary bladder is normal in appearance. A  prominent calcifications present possibly associated with the prostate.       Impression:      1. Acute appendicitis.        2. Diverticulosis.        3. Vascular calcifications present abdominal aorta.  4.  Left nephrolithiasis     These results were called to Dr. JACKSON 9:25 PM.         This report was finalized on 02/08/2022 21:25 by Dr. Yang Chaudhary MD.              Assessment/Plan       Acute appendicitis    Acute respiratory failure with hypoxia (HCC)    Elevated serum creatinine    Leukocytosis      We will keep Sánchez in for today to monitor urine output.  He is responding decently to the Lasix.  Will consult medicine.  He is still in recovery room now awaiting a unit bed      Milly Valverde MD  02/09/22  10:16 CST        Electronically signed by Milly Valverde MD at 02/09/22 1013

## 2022-02-10 ENCOUNTER — APPOINTMENT (OUTPATIENT)
Dept: GENERAL RADIOLOGY | Facility: HOSPITAL | Age: 87
End: 2022-02-10

## 2022-02-10 LAB
ANION GAP SERPL CALCULATED.3IONS-SCNC: 12 MMOL/L (ref 5–15)
BUN SERPL-MCNC: 50 MG/DL (ref 8–23)
BUN/CREAT SERPL: 21 (ref 7–25)
CALCIUM SPEC-SCNC: 8.9 MG/DL (ref 8.6–10.5)
CHLORIDE SERPL-SCNC: 101 MMOL/L (ref 98–107)
CO2 SERPL-SCNC: 29 MMOL/L (ref 22–29)
CREAT SERPL-MCNC: 2.38 MG/DL (ref 0.76–1.27)
CYTO UR: NORMAL
DEPRECATED RDW RBC AUTO: 45.7 FL (ref 37–54)
ERYTHROCYTE [DISTWIDTH] IN BLOOD BY AUTOMATED COUNT: 13.3 % (ref 12.3–15.4)
GFR SERPL CREATININE-BSD FRML MDRD: 26 ML/MIN/1.73
GLUCOSE SERPL-MCNC: 99 MG/DL (ref 65–99)
HCT VFR BLD AUTO: 37.6 % (ref 37.5–51)
HGB BLD-MCNC: 12.1 G/DL (ref 13–17.7)
LAB AP CASE REPORT: NORMAL
LYMPHOCYTES # BLD MANUAL: 0.85 10*3/MM3 (ref 0.7–3.1)
LYMPHOCYTES NFR BLD MANUAL: 1 % (ref 5–12)
MAGNESIUM SERPL-MCNC: 2 MG/DL (ref 1.6–2.4)
MCH RBC QN AUTO: 29.7 PG (ref 26.6–33)
MCHC RBC AUTO-ENTMCNC: 32.2 G/DL (ref 31.5–35.7)
MCV RBC AUTO: 92.4 FL (ref 79–97)
METAMYELOCYTES NFR BLD MANUAL: 2 % (ref 0–0)
MONOCYTES # BLD: 0.08 10*3/MM3 (ref 0.1–0.9)
NEUTROPHILS # BLD AUTO: 7.38 10*3/MM3 (ref 1.7–7)
NEUTROPHILS NFR BLD MANUAL: 64 % (ref 42.7–76)
NEUTS BAND NFR BLD MANUAL: 23 % (ref 0–5)
PATH REPORT.FINAL DX SPEC: NORMAL
PATH REPORT.GROSS SPEC: NORMAL
PLAT MORPH BLD: NORMAL
PLATELET # BLD AUTO: 289 10*3/MM3 (ref 140–450)
PMV BLD AUTO: 10.5 FL (ref 6–12)
POTASSIUM SERPL-SCNC: 4.8 MMOL/L (ref 3.5–5.2)
RBC # BLD AUTO: 4.07 10*6/MM3 (ref 4.14–5.8)
RBC MORPH BLD: NORMAL
SODIUM SERPL-SCNC: 142 MMOL/L (ref 136–145)
VARIANT LYMPHS NFR BLD MANUAL: 10 % (ref 19.6–45.3)
WBC MORPH BLD: NORMAL
WBC NRBC COR # BLD: 8.48 10*3/MM3 (ref 3.4–10.8)

## 2022-02-10 PROCEDURE — 25010000002 ENOXAPARIN PER 10 MG: Performed by: SPECIALIST

## 2022-02-10 PROCEDURE — 71045 X-RAY EXAM CHEST 1 VIEW: CPT

## 2022-02-10 PROCEDURE — 83735 ASSAY OF MAGNESIUM: CPT | Performed by: INTERNAL MEDICINE

## 2022-02-10 PROCEDURE — 85007 BL SMEAR W/DIFF WBC COUNT: CPT | Performed by: SPECIALIST

## 2022-02-10 PROCEDURE — 80048 BASIC METABOLIC PNL TOTAL CA: CPT | Performed by: SPECIALIST

## 2022-02-10 PROCEDURE — 94660 CPAP INITIATION&MGMT: CPT

## 2022-02-10 PROCEDURE — 94799 UNLISTED PULMONARY SVC/PX: CPT

## 2022-02-10 PROCEDURE — 92610 EVALUATE SWALLOWING FUNCTION: CPT | Performed by: SPEECH-LANGUAGE PATHOLOGIST

## 2022-02-10 PROCEDURE — 25010000002 PIPERACILLIN SOD-TAZOBACTAM PER 1 G: Performed by: SPECIALIST

## 2022-02-10 PROCEDURE — 25010000002 MORPHINE SULFATE (PF) 2 MG/ML SOLUTION: Performed by: SPECIALIST

## 2022-02-10 PROCEDURE — 85025 COMPLETE CBC W/AUTO DIFF WBC: CPT | Performed by: SPECIALIST

## 2022-02-10 RX ORDER — LISINOPRIL 10 MG/1
10 TABLET ORAL DAILY
COMMUNITY

## 2022-02-10 RX ADMIN — TAZOBACTAM SODIUM AND PIPERACILLIN SODIUM 2.25 G: 250; 2 INJECTION, SOLUTION INTRAVENOUS at 16:26

## 2022-02-10 RX ADMIN — MORPHINE SULFATE 2 MG: 2 INJECTION, SOLUTION INTRAMUSCULAR; INTRAVENOUS at 22:37

## 2022-02-10 RX ADMIN — ENOXAPARIN SODIUM 30 MG: 30 INJECTION SUBCUTANEOUS at 08:45

## 2022-02-10 RX ADMIN — TAZOBACTAM SODIUM AND PIPERACILLIN SODIUM 2.25 G: 250; 2 INJECTION, SOLUTION INTRAVENOUS at 07:36

## 2022-02-10 RX ADMIN — MORPHINE SULFATE 2 MG: 2 INJECTION, SOLUTION INTRAMUSCULAR; INTRAVENOUS at 01:59

## 2022-02-10 NOTE — PROGRESS NOTES
Milly Valverde MD Progress Note     LOS: 2 days   Patient Care Team:  Philippe Oliveira, DO as PCP - General (Family Medicine)        Subjective     Doing better.  Tolerating clear liquids.  Some belching but no vomiting.  No flatus or bowel movements    Objective     Vital Signs  Temp:  [97.3 °F (36.3 °C)-99.5 °F (37.5 °C)] 98.5 °F (36.9 °C)  Heart Rate:  [69-89] 73  Resp:  [17-26] 20  BP: ()/(50-73) 119/61    Intake & Output (last 3 days)       02/07 0701  02/08 0700 02/08 0701 02/09 0700 02/09 0701  02/10 0700 02/10 0701 02/11 0700    I.V. (mL/kg)  1000 (13) 300 (3.7)     IV Piggyback  250 150     Total Intake(mL/kg)  1250 (16.2) 450 (5.6)     Urine (mL/kg/hr)  150 1870 (1) 410 (0.7)    Drains  150 45 10    Total Output  300 1915 420    Net  +950 -1465 -420                  Physical Exam:     General Appearance:    Alert, cooperative, in no acute distress   Lungs:     respirations regular, even and unlabored    Heart:    Regular rhythm and normal rate, normal S1 and S2, no            murmur, no gallop, no rub   Chest Wall:    No abnormalities observed   Abdomen:      Soft full hypoactive bowel sounds   Extremities: No edema,    Results Review:     I reviewed the patient's new clinical results.    Lab Results (last 72 hours)     Procedure Component Value Units Date/Time    Tissue Pathology Exam [[id number]] Collected: 02/08/22 2253    Specimen: Tissue from Large Intestine, Appendix Updated: 02/10/22 0946     Case Report --     Surgical Pathology Report                         Case: [id number]                                  Authorizing Provider:  Milly Valverde MD        Collected:           02/08/2022 10:53 PM          Ordering Location:     [facility] OR  Received:            02/09/2022 07:05 AM          Pathologist:           Raji Pena MD                                                        Specimen:    Large Intestine, Appendix, Appendix                                                          Final Diagnosis --     Appendix, appendectomy:  Acute appendicitis with periappendicitis.       Gross Description --     Submitted in formalin in a container labeled with the patient's name and date of birth and designated appendix is a vermiform appendix which has a length of 5 cm and a diameter of 1.2 cm.  A moderate amount of attached mesoappendiceal fat is present.  The appendix is partially transected and the proximal and is open.  The serosal surface is roughened and reddish-tan and adherent clotted blood is present present.  The distal tip demonstrates surface fibrinopurulent exudate.  Separately submitted in the container is a 2.2 x 1 x 0.7 cm yellowish-tan fecalith.  The proximal surgical margin is painted with black ink in the department of pathology.  A section from the proximal surgical margin and a representative section of the midportion of the vermiform appendix is submitted in block 1A.  A longitudinal section through the distal tip of the vermiform appendix is submitted in block 1B.  A 1 cm fecalith is identified in the distal vermiform appendix.       Microscopic Description --     Sections demonstrate benign appendiceal wall with extensive acute inflammation.      Basic Metabolic Panel [[id number]]  (Abnormal) Collected: 02/10/22 0351    Specimen: Blood Updated: 02/10/22 0519     Glucose 99 mg/dL      BUN 50 mg/dL      Creatinine 2.38 mg/dL      Sodium 142 mmol/L      Potassium 4.8 mmol/L      Chloride 101 mmol/L      CO2 29.0 mmol/L      Calcium 8.9 mg/dL      eGFR Non African Amer 26 mL/min/1.73      BUN/Creatinine Ratio 21.0     Anion Gap 12.0 mmol/L     Narrative:      GFR Normal >60  Chronic Kidney Disease <60  Kidney Failure <15      Magnesium [[id number]]  (Normal) Collected: 02/10/22 0351    Specimen: Blood Updated: 02/10/22 0519     Magnesium 2.0 mg/dL     Manual Differential [[id number]]  (Abnormal) Collected: 02/10/22 0351    Specimen: Blood Updated: 02/10/22 0510      Neutrophil % 64.0 %      Lymphocyte % 10.0 %      Monocyte % 1.0 %      Bands %  23.0 %      Metamyelocyte % 2.0 %      Neutrophils Absolute 7.38 10*3/mm3      Lymphocytes Absolute 0.85 10*3/mm3      Monocytes Absolute 0.08 10*3/mm3      RBC Morphology Normal     WBC Morphology Normal     Platelet Morphology Normal    CBC & Differential [[id number]]  (Abnormal) Collected: 02/10/22 0351    Specimen: Blood Updated: 02/10/22 0510    Narrative:      The following orders were created for panel order CBC & Differential.  Procedure                               Abnormality         Status                     ---------                               -----------         ------                     CBC Auto Differential[[id number]]        Abnormal            Final result                 Please view results for these tests on the individual orders.    CBC Auto Differential [[id number]]  (Abnormal) Collected: 02/10/22 0351    Specimen: Blood Updated: 02/10/22 0510     WBC 8.48 10*3/mm3      RBC 4.07 10*6/mm3      Hemoglobin 12.1 g/dL      Hematocrit 37.6 %      MCV 92.4 fL      MCH 29.7 pg      MCHC 32.2 g/dL      RDW 13.3 %      RDW-SD 45.7 fl      MPV 10.5 fL      Platelets 289 10*3/mm3     Narrative:      The previously reported component NRBC is no longer being reported. Previous result was 0.0 /100 WBC (Reference Range: 0.0-0.2 /100 WBC) on 2/10/2022 at 0450 CST.    Blood Culture - Blood, Arm, Right [[id number]]  (Normal) Collected: 02/08/22 2145    Specimen: Blood from Arm, Right Updated: 02/09/22 2215     Blood Culture No growth at 24 hours    Blood Culture - Blood, Arm, Right [[id number]]  (Normal) Collected: 02/08/22 2155    Specimen: Blood from Arm, Right Updated: 02/09/22 2215     Blood Culture No growth at 24 hours    Urinalysis With Microscopic If Indicated (No Culture) - Urine, Clean Catch [[id number]]  (Normal) Collected: 02/09/22 1049    Specimen: Urine, Clean Catch Updated: 02/09/22 1130     Color, UA Yellow      Appearance, UA Clear     pH, UA <=5.0     Specific Gravity, UA 1.010     Glucose, UA Negative     Ketones, UA Negative     Bilirubin, UA Negative     Blood, UA Negative     Protein, UA Negative     Leuk Esterase, UA Negative     Nitrite, UA Negative     Urobilinogen, UA 0.2 E.U./dL    Narrative:      Urine microscopic not indicated.    BNP [[id number]]  (Abnormal) Collected: 02/09/22 0830    Specimen: Blood from Hand, Left Updated: 02/09/22 0935     proBNP 1,964.0 pg/mL     Narrative:      Among patients with dyspnea, NT-proBNP is highly sensitive for the detection of acute congestive heart failure. In addition NT-proBNP of <300 pg/ml effectively rules out acute congestive heart failure with 99% negative predictive value.    Results may be falsely decreased if patient taking Biotin.      Manual Differential [[id number]]  (Abnormal) Collected: 02/09/22 0830    Specimen: Blood from Hand, Left Updated: 02/09/22 0917     Neutrophil % 51.0 %      Lymphocyte % 22.0 %      Monocyte % 4.0 %      Eosinophil % 1.0 %      Bands %  15.0 %      Metamyelocyte % 5.0 %      Atypical Lymphocyte % 2.0 %      Neutrophils Absolute 3.29 10*3/mm3      Lymphocytes Absolute 1.20 10*3/mm3      Monocytes Absolute 0.20 10*3/mm3      Eosinophils Absolute 0.05 10*3/mm3      RBC Morphology Normal     WBC Morphology Normal     Clumped Platelets Present     Giant Platelets Large/3+    Basic Metabolic Panel [[id number]]  (Abnormal) Collected: 02/09/22 0830    Specimen: Blood from Hand, Left Updated: 02/09/22 0915     Glucose 112 mg/dL      BUN 39 mg/dL      Creatinine 2.20 mg/dL      Sodium 140 mmol/L      Potassium 5.1 mmol/L      Chloride 104 mmol/L      CO2 24.0 mmol/L      Calcium 8.7 mg/dL      eGFR Non African Amer 28 mL/min/1.73      BUN/Creatinine Ratio 17.7     Anion Gap 12.0 mmol/L     Narrative:      GFR Normal >60  Chronic Kidney Disease <60  Kidney Failure <15      CBC & Differential [[id number]]  (Abnormal) Collected: 02/09/22 0830     Specimen: Blood from Hand, Left Updated: 02/09/22 0905    Narrative:      The following orders were created for panel order CBC & Differential.  Procedure                               Abnormality         Status                     ---------                               -----------         ------                     CBC Auto Differential[[id number]]        Abnormal            Final result                 Please view results for these tests on the individual orders.    CBC Auto Differential [[id number]]  (Abnormal) Collected: 02/09/22 0830    Specimen: Blood from Hand, Left Updated: 02/09/22 0905     WBC 4.98 10*3/mm3      RBC 4.54 10*6/mm3      Hemoglobin 12.7 g/dL      Hematocrit 40.5 %      MCV 89.2 fL      MCH 28.0 pg      MCHC 31.4 g/dL      RDW 13.6 %      RDW-SD 44.7 fl      MPV 9.9 fL      Platelets 284 10*3/mm3     Blood Gas, Arterial - [[id number]]  (Abnormal) Collected: 02/09/22 0200    Specimen: Arterial Blood Updated: 02/09/22 0220     Site Right Radial     Navi's Test Positive     pH, Arterial 7.358 pH units      pCO2, Arterial 41.7 mm Hg      pO2, Arterial 42.5 mm Hg      Comment: 85 Value below critical limit        HCO3, Arterial 23.4 mmol/L      Base Excess, Arterial -2.1 mmol/L      Comment: 84 Value below reference range        O2 Saturation, Arterial 76.3 %      Comment: 84 Value below reference range        Temperature 37.0 C      Barometric Pressure for Blood Gas 748 mmHg      Modality Simple Mask     Flow Rate 10.0 lpm      Ventilator Mode NA     Notified Who LYNSEY LOBATO RN     Notified By [id number]     Notified Time 02/09/2022 02:07     Collected by [id number]     Comment: Meter: [id number]     :  [id number]        pCO2, Temperature Corrected 41.7 mm Hg      pH, Temp Corrected 7.358 pH Units      pO2, Temperature Corrected 42.5 mm Hg     Lactic Acid, Plasma [[id number]]  (Abnormal) Collected: 02/08/22 2157    Specimen: Blood Updated: 02/08/22 2226     Lactate 3.2 mmol/L     COVID PRE-OP /  PRE-PROCEDURE SCREENING ORDER (NO ISOLATION) - Swab, Nasal Cavity [[id number]]  (Normal) Collected: 02/08/22 1956    Specimen: Swab from Nasal Cavity Updated: 02/08/22 2048    Narrative:      The following orders were created for panel order COVID PRE-OP / PRE-PROCEDURE SCREENING ORDER (NO ISOLATION) - Swab, Nasal Cavity.  Procedure                               Abnormality         Status                     ---------                               -----------         ------                     COVID-19,[employer] [facility]...[[id number]]  Normal              Final result                 Please view results for these tests on the individual orders.    COVID-19,[employer] IN-HOUSE,Nasal Swab No Transport Media 3-4 HR TAT - Swab, Nasal Cavity [[id number]]  (Normal) Collected: 02/08/22 1956    Specimen: Swab from Nasal Cavity Updated: 02/08/22 2048     COVID19 Not Detected    Narrative:      Fact sheet for providers: https://www.fda.gov/media/321228/download     Fact sheet for patients: https://www.fda.gov/media/377401/download    Test performed by PCR.    Consider negative results in combination with clinical observations, patient history, and epidemiological information.    Comprehensive Metabolic Panel [[id number]]  (Abnormal) Collected: 02/08/22 1955    Specimen: Blood Updated: 02/08/22 2022     Glucose 135 mg/dL      BUN 33 mg/dL      Creatinine 2.19 mg/dL      Sodium 142 mmol/L      Potassium 4.7 mmol/L      Chloride 104 mmol/L      CO2 22.0 mmol/L      Calcium 9.8 mg/dL      Total Protein 7.8 g/dL      Albumin 4.30 g/dL      ALT (SGPT) 13 U/L      AST (SGOT) 15 U/L      Alkaline Phosphatase 39 U/L      Total Bilirubin 1.6 mg/dL      eGFR Non African Amer 28 mL/min/1.73      Globulin 3.5 gm/dL      A/G Ratio 1.2 g/dL      BUN/Creatinine Ratio 15.1     Anion Gap 16.0 mmol/L     Narrative:      GFR Normal >60  Chronic Kidney Disease <60  Kidney Failure <15      Lipase [[id number]]  (Normal) Collected: 02/08/22 1955    Specimen:  Blood Updated: 02/08/22 2022     Lipase 15 U/L     CBC & Differential [[id number]]  (Abnormal) Collected: 02/08/22 1956    Specimen: Blood Updated: 02/08/22 2004    Narrative:      The following orders were created for panel order CBC & Differential.  Procedure                               Abnormality         Status                     ---------                               -----------         ------                     CBC Auto Differential[[id number]]        Abnormal            Final result                 Please view results for these tests on the individual orders.    CBC Auto Differential [[id number]]  (Abnormal) Collected: 02/08/22 1956    Specimen: Blood Updated: 02/08/22 2004     WBC 19.04 10*3/mm3      RBC 5.18 10*6/mm3      Hemoglobin 14.4 g/dL      Hematocrit 46.0 %      MCV 88.8 fL      MCH 27.8 pg      MCHC 31.3 g/dL      RDW 13.5 %      RDW-SD 43.9 fl      MPV 9.9 fL      Platelets 384 10*3/mm3      Neutrophil % 87.9 %      Lymphocyte % 4.3 %      Monocyte % 7.1 %      Eosinophil % 0.0 %      Basophil % 0.2 %      Immature Grans % 0.5 %      Neutrophils, Absolute 16.74 10*3/mm3      Lymphocytes, Absolute 0.81 10*3/mm3      Monocytes, Absolute 1.35 10*3/mm3      Eosinophils, Absolute 0.00 10*3/mm3      Basophils, Absolute 0.04 10*3/mm3      Immature Grans, Absolute 0.10 10*3/mm3      nRBC 0.0 /100 WBC         Imaging Results (Last 72 Hours)     Procedure Component Value Units Date/Time    XR Chest 1 View [[id number]] Collected: 02/10/22 0945     Updated: 02/10/22 0950    Narrative:      EXAMINATION: XR CHEST 1 VW- 2/10/2022 9:45 AM CST     HISTORY: f/u cxr, hypoxia, ? pulm edema; K35.80-Unspecified acute  appendicitis; K37-Unspecified appendicitis; R13.10-Dysphagia,  unspecified.     REPORT: A frontal view of the chest was obtained.     COMPARISON: Chest x-rays 2/19/2022.     The lungs are moderately hypoaerated, there are patchy bibasilar  infiltrates as before without lobar consolidation. Infiltrates  appear  slightly improved. No pneumothorax or pleural effusion is identified.  Heart size is normal. There is ectasia of the thoracic aorta. No acute  osseous abnormality is identified.       Impression:      Continued hypoaeration of the lungs with mild improvement in  basilar infiltrates, which are greater on the left. No other change.  This report was finalized on 02/10/2022 09:47 by Dr. Tomy Villanueva MD.    XR Chest 1 View [[id number]] Collected: 02/09/22 0746     Updated: 02/09/22 0751    Narrative:      EXAMINATION: XR CHEST 1 VW-     2/9/2022 2:59 AM CST     HISTORY: persistent low Spo2 though pt fully AAOx3; denies pain, SOB,  c/o; K35.80-Unspecified acute appendicitis; K37-Unspecified appendicitis     1 view chest x-ray.     Comparison is made with an abdomen/pelvis CT performed yesterday at 9:08  PM.     Magnified heart size.     Patchy bibasilar infiltrate.     No pneumothorax.     Summary:  1. Patchy bibasilar infiltrate or atelectasis has developed since the CT  performed last night.  This report was finalized on 02/09/2022 07:48 by Dr. Alfredo Muhammad MD.    CT Abdomen Pelvis Without Contrast [[id number]] Collected: 02/08/22 2121     Updated: 02/08/22 2129    Narrative:      EXAMINATION:   CT ABDOMEN PELVIS WO CONTRAST-  2/8/2022 9:21 PM CST     HISTORY: CT ABDOMEN PELVIS WO CONTRAST- 2/8/2022 9:04 PM CST     HISTORY: abdominal pain      COMPARISON: None      DOSE LENGTH PRODUCT: 253 mGy cm. Automated exposure control was also  utilized to decrease patient radiation dose.     TECHNIQUE: Noncontrast enhanced images of the abdomen and pelvis  obtained without oral contrast. Multiplanar reformatted images were  provided for review.      FINDINGS:   The lung bases and base of the heart are unremarkable.      LIVER: No focal liver lesion.      BILIARY SYSTEM: The gallbladder is unremarkable. No intrahepatic or  extrahepatic ductal dilatation.      PANCREAS: No focal pancreatic lesion.      SPLEEN:  Unremarkable.      KIDNEYS AND ADRENALS: Adrenal glands are visualized. Right renal contour  is unremarkable. Nonobstructing calculi are present lower pole of the  left kidney..  The ureters are decompressed and normal in appearance.     RETROPERITONEUM: No mass, lymphadenopathy or hemorrhage.      GI TRACT: No evidence of obstruction or bowel wall thickening. The  appendix is visualized. The appendix is enlarged. The appendix is  edematous. An appendicolith is present. Consistent with acute  appendicitis. The appendix is approximately 2 cm in diameter.  Diverticulosis is present.     .     OTHER: There is no mesenteric mass, lymphadenopathy or fluid collection.  The osseous structures and soft tissues demonstrate no worrisome  lesions. Vascular calcifications present abdominal aorta and iliac  arteries.      PELVIS: No mass lesion, fluid collection or significant lymphadenopathy  is seen in the pelvis. The urinary bladder is normal in appearance. A  prominent calcifications present possibly associated with the prostate.       Impression:      1. Acute appendicitis.        2. Diverticulosis.        3. Vascular calcifications present abdominal aorta.  4. Left nephrolithiasis     These results were called to Dr. JACKSON 9:25 PM.         This report was finalized on 02/08/2022 21:25 by Dr. Yang Chaudhary MD.              Assessment/Plan       Acute appendicitis    Acute respiratory failure with hypoxia (HCC)    Elevated serum creatinine    Leukocytosis      Okay to transfer to floor.  Patient wants DNR status without reintubation or chest compressions      Milly Valverde MD  02/10/22  14:01 CST

## 2022-02-10 NOTE — PROGRESS NOTES
[facility] Medicine Services  INPATIENT PROGRESS NOTE    Patient Name: Kj Cerrato  Date of Admission: 2/8/2022  Today's Date: 02/10/22  Length of Stay: 2  Primary Care Physician: Philippe Oliveira DO    Subjective   Chief Complaint: f/u resp failure    HPI   Patient is looking better this morning.  He is off CPAP on 13 L high flow when I saw him.  His breathing looks comfortable.  He does not feel short of breath.  No chest pain.  No dizziness.  No nausea.  Had a fairly good night.  Afebrile.        Review of Systems   All pertinent negatives and positives are as above. All other systems have been reviewed and are negative unless otherwise stated.     Objective    Temp:  [97.3 °F (36.3 °C)-99.8 °F (37.7 °C)] 97.3 °F (36.3 °C)  Heart Rate:  [69-85] 73  Resp:  [17-28] 17  BP: ()/(50-73) 113/59  Physical Exam  GEN: Awake, alert, interactive, in NAD  HEENT: PERRLA, EOMI, Anicteric, Trachea midline  Lungs:  Today.  No overt wheezes or rales.  Heart: RRR, +S1/s2, no rub   ABD:  Bowel sounds heard but somewhat slow, abdominal bandage in place, drain with serosanguineous fluid.  Extremities: atraumatic, no cyanosis, no pitting edema  Skin: no rashes or petechiae  Neuro: AAOx3, no focal deficits  Psych: normal mood & affect      Results Review:  I have reviewed the labs, radiology results, and diagnostic studies.    Laboratory Data:   Results from last 7 days   Lab Units 02/10/22  0351 02/09/22  0830 02/08/22  1956   WBC 10*3/mm3 8.48 4.98 19.04*   HEMOGLOBIN g/dL 12.1* 12.7* 14.4   HEMATOCRIT % 37.6 40.5 46.0   PLATELETS 10*3/mm3 289 284 384        Results from last 7 days   Lab Units 02/10/22  0351 02/09/22  0830 02/08/22  1955   SODIUM mmol/L 142 140 142   POTASSIUM mmol/L 4.8 5.1 4.7   CHLORIDE mmol/L 101 104 104   CO2 mmol/L 29.0 24.0 22.0   BUN mg/dL 50* 39* 33*   CREATININE mg/dL 2.38* 2.20* 2.19*   CALCIUM mg/dL 8.9 8.7 9.8   BILIRUBIN mg/dL  --   --  1.6*   ALK PHOS  U/L  --   --  39   ALT (SGPT) U/L  --   --  13   AST (SGOT) U/L  --   --  15   GLUCOSE mg/dL 99 112* 135*       Culture Data:   Blood Culture   Date Value Ref Range Status   02/08/2022 No growth at 24 hours  Preliminary   02/08/2022 No growth at 24 hours  Preliminary       Radiology Data:   Imaging Results (Last 24 Hours)     Procedure Component Value Units Date/Time    XR Chest 1 View [[id number]] Collected: 02/10/22 0945     Updated: 02/10/22 0950    Narrative:      EXAMINATION: XR CHEST 1 VW- 2/10/2022 9:45 AM CST     HISTORY: f/u cxr, hypoxia, ? pulm edema; K35.80-Unspecified acute  appendicitis; K37-Unspecified appendicitis; R13.10-Dysphagia,  unspecified.     REPORT: A frontal view of the chest was obtained.     COMPARISON: Chest x-rays 2/19/2022.     The lungs are moderately hypoaerated, there are patchy bibasilar  infiltrates as before without lobar consolidation. Infiltrates appear  slightly improved. No pneumothorax or pleural effusion is identified.  Heart size is normal. There is ectasia of the thoracic aorta. No acute  osseous abnormality is identified.       Impression:      Continued hypoaeration of the lungs with mild improvement in  basilar infiltrates, which are greater on the left. No other change.  This report was finalized on 02/10/2022 09:47 by Dr. Tomy Villanueva MD.          I have reviewed the patient's current medications.     Assessment/Plan     Active Hospital Problems    Diagnosis    • **Acute appendicitis    • Acute respiratory failure with hypoxia (HCC)    • Elevated serum creatinine    • Leukocytosis        Plan:  #1 acute appendicitis -post IR drain in place.  Antibiotics postop care per surgery.    #2 acute respiratory failure hypoxia -suspect some flash pulmonary edema intraoperatively.  Did well with Lasix yesterday with good urine output nearly 2 L and 1-1/2 L negative for the day.  2D echo with preserved EF no overt mention of diastolic dysfunction.  He is now off CPAP and looks  comfortable.  Repeat chest x-ray ordered earlier and personally reviewed.  Looks improved.  Will hold off on further diuretic today and monitor ongoing response.  Continue wean O2 as able.    #3 renal sufficiency patient denies any renal dysfunction history but has previous history reported by some provider of renal insufficiency entered in the chart.  Prior creatinines available from 2018 were around 1.2 but no follow-up labs available to me from then till now.  He was 2.2 preop and postop.  2.4 today after diuretic.  As above we will hold off on further diuretic monitor closely.  Will likely need renal ultrasound some point but deferring until okay with surgery given recent abdominal surgery.  He is urinating without issue.  No blood in urine currently.  Ultrasound is not emergent.    #4 leukocytosis -resolved.  Does not appear septic.    Patient looks a lot better today than yesterday.  From medical standpoint okay to go to the floor when okay with surgery.  Medicine team will continue to follow.    Electronically signed by Idris Sandhu DO, 02/10/22, 08:42 CST.

## 2022-02-10 NOTE — PLAN OF CARE
Goal Outcome Evaluation:  Plan of Care Reviewed With: patient  Pt had no problems with his surgical sites through the shift. KANG drain output became less as the shift progressed 20 ml total). He is not having pain except when he starts coughing. No abdominal pain to palpation. He complained of nausea for the early part of the shift, but has not had a problem with this since around 0000.  His BP's are soft when sleeping. Urine output via barclay was 850 ml (400, 250, 200 = decreased as shift went on). He is still NPO except for ice chips. No fluids running. He only has one peripheral IV. It may not be adequate if he continues to have/need multiple things running. He has struggled to maintain his O2 sats all night. He does not want to wear the CPAP, and will take it off at times because he feels nauseated and has a dry mouth. We attempted to use high flow humidified nasal cannula for his comfort/preference. It will maintain him around 90-92 for a little while, but then it becomes inadequate. We put the CPAP back on around 0600 at the direction of RT. His BUN and creat were more elevated today than yesterday.

## 2022-02-10 NOTE — CASE MANAGEMENT/SOCIAL WORK
Discharge Planning Assessment  [facility]     Patient Name: Kj Cerrato  MRN: [id number]  Today's Date: 2/10/2022    Admit Date: 2/8/2022     Discharge Needs Assessment     Row Name 02/10/22 1039       Living Environment    Lives With alone    Current Living Arrangements home/apartment/condo    Primary Care Provided by self    Provides Primary Care For no one    Family Caregiver if Needed child(daisy), adult    Family Caregiver Names Daughter - Arinaa Rodriguez    Quality of Family Relationships supportive; helpful; involved    Able to Return to Prior Arrangements yes       Resource/Environmental Concerns    Resource/Environmental Concerns none       Transition Planning    Patient/Family Anticipates Transition to home    Patient/Family Anticipated Services at Transition none    Transportation Anticipated family or friend will provide       Discharge Needs Assessment    Readmission Within the Last 30 Days no previous admission in last 30 days    Equipment Currently Used at Home none    Concerns to be Addressed discharge planning    Current Discharge Risk lives alone    Discharge Coordination/Progress SW spoke with patient's daughter, Ariana Rodriguez, regarding discharge plan/needs.  Patient resides alone and functions independently, drives etc.  Daughter anticipates patient returning home.  SW will follow for needs.               Discharge Plan    No documentation.               Continued Care and Services - Admitted Since 2/8/2022    Coordination has not been started for this encounter.          Demographic Summary    No documentation.                Functional Status    No documentation.                Psychosocial    No documentation.                Abuse/Neglect    No documentation.                Legal    No documentation.                Substance Abuse    No documentation.                Patient Forms    No documentation.                   LANNY Mendoza

## 2022-02-10 NOTE — PLAN OF CARE
Goal Outcome Evaluation:      Clinical bedside swallow evaluation completed. Pt upright in bed and alert. HX: CVA, dysphagia s/p CVA, appendectomy, hypertension, and lung nodule. Oral mechanism exam completed. No abnormal structural concerns, facial drooping, or facial weakness noted. PO trials consist of puree, honey thick, nectar thick, and thin. 1x coughing observed on thin liquids; however, pt was self feeding and had too much intake. Pt instructed to take smaller sips and no coughing observed. Pt did not present with any other overt s/s of aspiration with other PO trial consistencies. Current diet clear liquids due to appendectomy, pt OK for thin liquids. Meds administered as tolerated. It is recommended that pt takes general aspiration precautions and takes smaller sips of thin liquids. Proper oral care is also recommended. SLP will continue to follow for diet tolerance.     Randi Cano, Speech Therapy Student  09:48 CST  02/10/22       Progress: (P) improving

## 2022-02-10 NOTE — PLAN OF CARE
Goal Outcome Evaluation:  Plan of Care Reviewed With: patient        Progress: no change  Outcome Summary: Initial nutrition assessment. Pt is s/p appendectomy secondary to acute appendicitis. He is ordered a clear liquid diet. ST completed CBSE and has OK'd thin liquids. Noted hx of CVA with dysphagia. ST to cont to follow. No po intake recorded yet. Reports usually has a good appetite, has been poor recently to current illness. Cont to follow.

## 2022-02-10 NOTE — PLAN OF CARE
Goal Outcome Evaluation:              Outcome Summary: VSS. Pt on CPAP with sats in the mid 90s. Oxygen decreased from 70% to 60%. Pt c/o of CPAP mask discomfort and nausea associated with it. Zofran given x1. Sites cdi.

## 2022-02-10 NOTE — THERAPY EVALUATION
Acute Care - Speech Language Pathology   Swallow Initial Evaluation [facility]     Patient Name: Kj Cerrato  : 1932  MRN: [id number]  Today's Date: 2/10/2022               Admit Date: 2022    Goal Outcome Evaluation:      Clinical bedside swallow evaluation completed. Pt upright in bed and alert. HX: CVA, dysphagia s/p CVA, appendectomy, hypertension, and lung nodule. Oral mechanism exam completed. No abnormal structural concerns, facial drooping, or facial weakness noted. PO trials consist of puree, honey thick, nectar thick, and thin. 1x coughing observed on thin liquids; however, pt was self feeding and had too much intake. Pt instructed to take smaller sips and no coughing observed. Pt did not present with any other overt s/s of aspiration with other PO trial consistencies. Current diet clear liquids due to appendectomy, pt OK for thin liquids. Meds administered as tolerated. It is recommended that pt takes general aspiration precautions and takes smaller sips of thin liquids. Proper oral care is also recommended. SLP will continue to follow for diet tolerance.     Randi Cano, Speech Therapy Student  09:48 CST  02/10/22       Progress: (P) improving       Visit Dx:     ICD-10-CM ICD-9-CM   1. Acute appendicitis, unspecified acute appendicitis type  K35.80 540.9   2. Appendicitis  K37 541   3. Dysphagia, unspecified type  R13.10 787.20     Patient Active Problem List   Diagnosis    Renal artery stenosis (HCC)    Aortoiliac occlusive disease (HCC)    Bilateral carotid artery stenosis    Acute appendicitis    Acute respiratory failure with hypoxia (HCC)    Elevated serum creatinine    Leukocytosis     Past Medical History:   Diagnosis Date    Atherosclerosis     BPH (benign prostatic hyperplasia)     Carotid artery disease (HCC)     High cholesterol     Hypertension     Kidney cysts     Kidney stone     Renal artery stenosis (HCC)     Renal artery stenosis (HCC) 2018    Renal insufficiency      Solitary lung nodule     Stroke (HCC)     Residual speech problems.     Past Surgical History:   Procedure Laterality Date    APPENDECTOMY N/A 2/8/2022    Procedure: APPENDECTOMY LAPAROSCOPIC;  Surgeon: Milly Valverde MD;  Location:  [facility] OR;  Service: General;  Laterality: N/A;    CAROTID ENDARTERECTOMY Right 2009    Per Dr Idris Dotson    PROSTATE SURGERY         SLP Recommendation and Plan                                                                             SWALLOW EVALUATION (last 72 hours)       SLP Adult Swallow Evaluation       Row Name 02/10/22 0850                   Rehab Evaluation    Document Type evaluation  -MM (r) ER (t) MM (c)        Subjective Information no complaints  -MM (r) ER (t) MM (c)        Patient Observations alert; cooperative; agree to therapy  -MM (r) ER (t) MM (c)        Patient/Family/Caregiver Comments/Observations no family present  -MM (r) ER (t) MM (c)        Patient Effort good  -MM (r) ER (t) MM (c)                  General Information      Patient Profile Reviewed yes  -MM (r) ER (t) MM (c)        Pertinent History Of Current Problem Hx: stroke, CAD, hypertension, lung nodule, dyphagia s/p CVA, abdominal pain  -MM (r) ER (t) MM (c)        Current Method of Nutrition clear liquids  -MM (r) ER (t) MM (c)        Precautions/Limitations, Vision WFL; for purposes of eval  -MM (r) ER (t) MM (c)        Precautions/Limitations, Hearing WFL; for purposes of eval  -MM (r) ER (t) MM (c)        Prior Level of Function-Communication motor speech impairment  slurred speech from prior stroke  -MM        Prior Level of Function-Swallowing unknown  some difficulty from prior stroke but reg/thin at home  -MM        Plans/Goals Discussed with patient  -MM (r) ER (t) MM (c)        Barriers to Rehab none identified  -MM (r) ER (t) MM (c)                  Pain      Additional Documentation Pain Scale: FACES Pre/Post-Treatment (Group)  -MM (r) ER (t) MM (c)                  Pain Scale:  FACES Pre/Post-Treatment      Pain: FACES Scale, Pretreatment 0-->no hurt  -MM (r) ER (t) MM (c)        Posttreatment Pain Rating 0-->no hurt  -MM (r) ER (t) MM (c)                  Oral Motor Structure and Function      Oral Lesions or Structural Abnormalities and/or variants WNL  -MM (r) ER (t) MM (c)        Dentition Assessment teeth are in poor condition  -MM (r) ER (t) MM (c)        Secretion Management wet vocal quality  -MM (r) ER (t) MM (c)        Volitional Swallow WFL  -MM (r) ER (t) MM (c)                  Oral Musculature and Cranial Nerve Assessment      Oral Motor General Assessment WFL  -MM (r) ER (t) MM (c)                  General Eating/Swallowing Observations      Respiratory Support Currently in Use room air  -MM (r) ER (t) MM (c)        Eating/Swallowing Skills self-fed; fed by SLP  -MM (r) ER (t) MM (c)        Positioning During Eating upright in bed  -MM (r) ER (t) MM (c)        Utensils Used spoon; straw  -MM (r) ER (t) MM (c)        Consistencies Trialed pudding thick; honey-thick liquids; nectar/syrup-thick liquids; thin liquids  -MM (r) ER (t) MM (c)                  Clinical Swallow Eval      Oral Prep Phase WFL  -MM (r) ER (t) MM (c)        Oral Transit WFL  -MM (r) ER (t) MM (c)        Oral Residue WFL  -MM (r) ER (t) MM (c)        Pharyngeal Phase suspected pharyngeal impairment  -MM (r) ER (t) MM (c)        Esophageal Phase unremarkable  -MM (r) ER (t) MM (c)        Clinical Swallow Evaluation Summary See note  -MM (r) ER (t) MM (c)                  Pharyngeal Phase Concerns      Pharyngeal Phase Concerns wet vocal quality; throat clear; cough  -MM (r) ER (t) MM (c)        Wet Vocal Quality thin  -MM (r) ER (t) MM (c)        Cough thin  -MM (r) ER (t) MM (c)        Throat Clear thin  -MM (r) ER (t) MM (c)        Pharyngeal Phase Concerns, Comment See note  -MM (r) ER (t) MM (c)                  SLP Evaluation Clinical Impression      SLP Swallowing Diagnosis R/O pharyngeal dysphagia   -MM (r) ER (t) MM (c)        Functional Impact risk of aspiration/pneumonia  -MM (r) ER (t) MM (c)        Rehab Potential/Prognosis, Swallowing good, to achieve stated therapy goals  -MM (r) ER (t) MM (c)        Swallow Criteria for Skilled Therapeutic Interventions Met demonstrates skilled criteria  -MM (r) ER (t) MM (c)                  Recommendations      Therapy Frequency (Swallow) PRN  -MM (r) ER (t) MM (c)        Predicted Duration Therapy Intervention (Days) until discharge  -MM (r) ER (t) MM (c)        SLP Diet Recommendation clear liquid diet; thin liquids  -MM (r) ER (t) MM (c)        Recommended Precautions and Strategies general aspiration precautions; alternate between small bites of food and sips of liquid; small bites of food and sips of liquid; upright posture during/after eating  -MM (r) ER (t) MM (c)        Oral Care Recommendations Oral Care BID/PRN  -MM (r) ER (t) MM (c)        SLP Rec. for Method of Medication Administration as tolerated  -MM (r) ER (t) MM (c)        Monitor for Signs of Aspiration yes; notify SLP if any concerns  -MM (r) ER (t) MM (c)        Anticipated Discharge Disposition (SLP) unknown  -MM (r) ER (t) MM (c)                  Swallow Goals (SLP)      Oral Nutrition/Hydration Goal Selection (SLP) oral nutrition/hydration, SLP goal 1  -MM (r) ER (t) MM (c)                  Oral Nutrition/Hydration Goal 1 (SLP)      Oral Nutrition/Hydration Goal 1, SLP pt will tolerate LRD w/o any overt s/s of aspiration  -MM (r) ER (t) MM (c)        Time Frame (Oral Nutrition/Hydration Goal 1, SLP) by discharge  -MM (r) ER (t) MM (c)        Barriers (Oral Nutrition/Hydration Goal 1, SLP) n/a  -MM (r) ER (t) MM (c)        Progress/Outcomes (Oral Nutrition/Hydration Goal 1, SLP) goal ongoing  -MM (r) ER (t) MM (c)                User Key  (r) = Recorded By, (t) = Taken By, (c) = Cosigned By      Initials Name Effective Dates    Martine Persaud, MS CCC-SLP 06/16/21 -     Randi Campuzano  Speech Therapy Student 01/06/22 -                     EDUCATION  The patient has been educated in the following areas:   Dysphagia (Swallowing Impairment).        SLP GOALS       Row Name 02/10/22 0850             Oral Nutrition/Hydration Goal 1 (SLP)    Oral Nutrition/Hydration Goal 1, SLP pt will tolerate LRD w/o any overt s/s of aspiration  -MM (r) ER (t) MM (c)      Time Frame (Oral Nutrition/Hydration Goal 1, SLP) by discharge  -MM (r) ER (t) MM (c)      Barriers (Oral Nutrition/Hydration Goal 1, SLP) n/a  -MM (r) ER (t) MM (c)      Progress/Outcomes (Oral Nutrition/Hydration Goal 1, SLP) goal ongoing  -MM (r) ER (t) MM (c)                User Key  (r) = Recorded By, (t) = Taken By, (c) = Cosigned By      Initials Name Provider Type    Martine Persaud MS CCC-SLP Speech and Language Pathologist    Randi Campuzano, Speech Therapy Student Speech Therapy Student                        Time Calculation:    Time Calculation- SLP       Row Name 02/10/22 0850             Time Calculation- SLP    SLP Start Time 0850  -MM (r) ER (t) MM (c)      SLP Stop Time 0950  -MM (r) ER (t) MM (c)      SLP Time Calculation (min) 60 min  -MM (r) ER (t)      SLP Received On 02/10/22  -MM (r) ER (t) MM (c)      SLP Goal Re-Cert Due Date 02/20/22  -MM (r) ER (t) MM (c)              Untimed Charges      SLP Eval/Re-eval  ST Eval Oral Pharyng Swallow - 93742  -MM (r) ER (t) MM (c)      [id number] Eval Oral Pharyng Swallow Minutes 60  -MM (r) ER (t) MM (c)              Total Minutes      Untimed Charges Total Minutes 60  -MM (r) ER (t)       Total Minutes 60  -MM (r) ER (t)              User Key  (r) = Recorded By, (t) = Taken By, (c) = Cosigned By      Initials Name Provider Type    Martine Persaud MS CCC-MARLEN Speech and Language Pathologist    Randi Campuzano, Speech Therapy Student Speech Therapy Student                    Therapy Charges for Today       Code Description Service Date Service Provider Modifiers Qty     [id number] [facility] ORAL PHARYNG SWALLOW 4 2/10/2022 Martine Mcknight, MS CCC-SLP GN 1                 Martine Mcknight MS CCC-SLP  2/10/2022

## 2022-02-11 LAB
ANION GAP SERPL CALCULATED.3IONS-SCNC: 10 MMOL/L (ref 5–15)
BASOPHILS # BLD AUTO: 0.04 10*3/MM3 (ref 0–0.2)
BASOPHILS NFR BLD AUTO: 0.3 % (ref 0–1.5)
BUN SERPL-MCNC: 49 MG/DL (ref 8–23)
BUN/CREAT SERPL: 29.3 (ref 7–25)
CALCIUM SPEC-SCNC: 9.3 MG/DL (ref 8.6–10.5)
CHLORIDE SERPL-SCNC: 101 MMOL/L (ref 98–107)
CO2 SERPL-SCNC: 31 MMOL/L (ref 22–29)
CREAT SERPL-MCNC: 1.67 MG/DL (ref 0.76–1.27)
DEPRECATED RDW RBC AUTO: 43.3 FL (ref 37–54)
EOSINOPHIL # BLD AUTO: 0.15 10*3/MM3 (ref 0–0.4)
EOSINOPHIL NFR BLD AUTO: 1.2 % (ref 0.3–6.2)
ERYTHROCYTE [DISTWIDTH] IN BLOOD BY AUTOMATED COUNT: 13.2 % (ref 12.3–15.4)
GFR SERPL CREATININE-BSD FRML MDRD: 39 ML/MIN/1.73
GLUCOSE SERPL-MCNC: 113 MG/DL (ref 65–99)
HCT VFR BLD AUTO: 43.5 % (ref 37.5–51)
HGB BLD-MCNC: 13.2 G/DL (ref 13–17.7)
IMM GRANULOCYTES # BLD AUTO: 0.08 10*3/MM3 (ref 0–0.05)
IMM GRANULOCYTES NFR BLD AUTO: 0.6 % (ref 0–0.5)
LYMPHOCYTES # BLD AUTO: 0.92 10*3/MM3 (ref 0.7–3.1)
LYMPHOCYTES NFR BLD AUTO: 7.4 % (ref 19.6–45.3)
MCH RBC QN AUTO: 27.4 PG (ref 26.6–33)
MCHC RBC AUTO-ENTMCNC: 30.3 G/DL (ref 31.5–35.7)
MCV RBC AUTO: 90.2 FL (ref 79–97)
MONOCYTES # BLD AUTO: 0.94 10*3/MM3 (ref 0.1–0.9)
MONOCYTES NFR BLD AUTO: 7.6 % (ref 5–12)
NEUTROPHILS NFR BLD AUTO: 10.27 10*3/MM3 (ref 1.7–7)
NEUTROPHILS NFR BLD AUTO: 82.9 % (ref 42.7–76)
NRBC BLD AUTO-RTO: 0 /100 WBC (ref 0–0.2)
PLATELET # BLD AUTO: 387 10*3/MM3 (ref 140–450)
PMV BLD AUTO: 10.3 FL (ref 6–12)
POTASSIUM SERPL-SCNC: 4.6 MMOL/L (ref 3.5–5.2)
RBC # BLD AUTO: 4.82 10*6/MM3 (ref 4.14–5.8)
SODIUM SERPL-SCNC: 142 MMOL/L (ref 136–145)
WBC NRBC COR # BLD: 12.4 10*3/MM3 (ref 3.4–10.8)

## 2022-02-11 PROCEDURE — 94799 UNLISTED PULMONARY SVC/PX: CPT

## 2022-02-11 PROCEDURE — 94660 CPAP INITIATION&MGMT: CPT

## 2022-02-11 PROCEDURE — 25010000002 PIPERACILLIN SOD-TAZOBACTAM PER 1 G: Performed by: SPECIALIST

## 2022-02-11 PROCEDURE — 80048 BASIC METABOLIC PNL TOTAL CA: CPT | Performed by: SPECIALIST

## 2022-02-11 PROCEDURE — 25010000002 FUROSEMIDE PER 20 MG: Performed by: INTERNAL MEDICINE

## 2022-02-11 PROCEDURE — 25010000002 ENOXAPARIN PER 10 MG: Performed by: SPECIALIST

## 2022-02-11 PROCEDURE — 85025 COMPLETE CBC W/AUTO DIFF WBC: CPT | Performed by: SPECIALIST

## 2022-02-11 PROCEDURE — 92526 ORAL FUNCTION THERAPY: CPT | Performed by: SPEECH-LANGUAGE PATHOLOGIST

## 2022-02-11 RX ORDER — FUROSEMIDE 10 MG/ML
20 INJECTION INTRAMUSCULAR; INTRAVENOUS ONCE
Status: COMPLETED | OUTPATIENT
Start: 2022-02-11 | End: 2022-02-11

## 2022-02-11 RX ADMIN — FUROSEMIDE 20 MG: 10 INJECTION, SOLUTION INTRAMUSCULAR; INTRAVENOUS at 12:50

## 2022-02-11 RX ADMIN — TAZOBACTAM SODIUM AND PIPERACILLIN SODIUM 2.25 G: 250; 2 INJECTION, SOLUTION INTRAVENOUS at 16:21

## 2022-02-11 RX ADMIN — TAZOBACTAM SODIUM AND PIPERACILLIN SODIUM 2.25 G: 250; 2 INJECTION, SOLUTION INTRAVENOUS at 08:30

## 2022-02-11 RX ADMIN — TAZOBACTAM SODIUM AND PIPERACILLIN SODIUM 2.25 G: 250; 2 INJECTION, SOLUTION INTRAVENOUS at 00:36

## 2022-02-11 RX ADMIN — ENOXAPARIN SODIUM 30 MG: 30 INJECTION SUBCUTANEOUS at 08:30

## 2022-02-11 NOTE — PLAN OF CARE
Goal Outcome Evaluation:    Diet tolerance completed. Pt upright in bed and alert. Daughter present during treatment. RN reports clear liquid diet still in order as abdomen continues to heal after surgery. Thin liquid PO trial  x5 provided. Pt requested to drink out of cup rather than straw. No overt s/s aspiration observed. Pt does have chronic dysphagia for a previous CVA. Diet recommendations include thin liquids and clear liquids until cleared for regular solids. Education on importance of oral care to prevent aspiration pneumonia provided, in addition to education on general aspiration precautions. Pt is aware of chronic dysphagia and utilizes compensatory strategies independently. SLP signing off. Please re consult if any other problems/concerns arise.     Randi Cano, Speech Therapy Student.  14:39 CST  02/11/22      Plan of Care Reviewed With: (P) patient        Progress: (P) improving

## 2022-02-11 NOTE — PROGRESS NOTES
[facility] Medicine Services  INPATIENT PROGRESS NOTE    Patient Name: Kj Cerrato  Date of Admission: 2/8/2022  Today's Date: 02/11/22  Length of Stay: 3  Primary Care Physician: Philippe Oliveira DO    Subjective   Chief Complaint: f/u resp failure    HPI   Patient seen and examined with family at bedside.  He is still on 10 L high flow.  He feels well however.  No chest pain.  No overt shortness of breath.  No dizziness.  Tolerating some clears today.  Belching.  Passing flatulence but no BM yet.  Afebrile overnight.  No other acute issues noted.    ROS:  All pertinent negatives and positives are as above. All other systems have been reviewed and are negative unless otherwise stated.     Objective    Temp:  [97.9 °F (36.6 °C)-98.8 °F (37.1 °C)] 98.1 °F (36.7 °C)  Heart Rate:  [62-83] 75  Resp:  [16-20] 20  BP: (118-147)/(61-79) 136/65  Physical Exam  GEN: Awake, alert, interactive, in NAD  HEENT: PERRLA, EOMI, Anicteric, Trachea midline  Lungs:  CTAB without wheezes or rales.  Heart: RRR, +S1/s2, no rub   ABD:  + bowel sounds, abdominal bandage in place, drain with serosanguineous fluid.  Extremities: atraumatic, no cyanosis, no pitting edema  Skin: no rashes or petechiae  Neuro: AAOx3, no focal deficits  Psych: normal mood & affect      Results Review:  I have reviewed the labs, radiology results, and diagnostic studies.    Laboratory Data:   Results from last 7 days   Lab Units 02/11/22  0515 02/10/22  0351 02/09/22  0830   WBC 10*3/mm3 12.40* 8.48 4.98   HEMOGLOBIN g/dL 13.2 12.1* 12.7*   HEMATOCRIT % 43.5 37.6 40.5   PLATELETS 10*3/mm3 387 289 284        Results from last 7 days   Lab Units 02/11/22  0515 02/10/22  0351 02/09/22  0830 02/08/22 1955 02/08/22 1955   SODIUM mmol/L 142 142 140   < > 142   POTASSIUM mmol/L 4.6 4.8 5.1   < > 4.7   CHLORIDE mmol/L 101 101 104   < > 104   CO2 mmol/L 31.0* 29.0 24.0   < > 22.0   BUN mg/dL 49* 50* 39*   < > 33*   CREATININE  mg/dL 1.67* 2.38* 2.20*   < > 2.19*   CALCIUM mg/dL 9.3 8.9 8.7   < > 9.8   BILIRUBIN mg/dL  --   --   --   --  1.6*   ALK PHOS U/L  --   --   --   --  39   ALT (SGPT) U/L  --   --   --   --  13   AST (SGOT) U/L  --   --   --   --  15   GLUCOSE mg/dL 113* 99 112*   < > 135*    < > = values in this interval not displayed.       Culture Data:   Blood Culture   Date Value Ref Range Status   02/08/2022 No growth at 24 hours  Preliminary   02/08/2022 No growth at 24 hours  Preliminary       Radiology Data:   Imaging Results (Last 24 Hours)     ** No results found for the last 24 hours. **          I have reviewed the patient's current medications.     Assessment/Plan     Active Hospital Problems    Diagnosis    • **Acute appendicitis    • Acute respiratory failure with hypoxia (HCC)    • Elevated serum creatinine    • Leukocytosis        Plan:  #1 acute appendicitis -post OR with drain in place.  Antibiotics and postop care per surgery.    #2 acute respiratory failure hypoxia -suspect some flash pulmonary edema intraoperatively.  Did well with Lasix yesterday.  With good urine output nearly 2 L and 1-1/2 L negative for the day.  2D echo with preserved EF no overt mention of diastolic dysfunction.  He is now off CPAP and looks comfortable.  Repeat chest x-ray looks improved.  Gave him a break from diuretic yesterday hoping he would improve without further but has not.  Renal function stable and improving today.  We will give another dose of Lasix and monitor.    #3 renal sufficiency - patient denies any renal dysfunction history but has previous history reported by a provider of renal insufficiency entered in the chart.  Prior creatinines available from 2018 were around 1.2 but no follow-up labs available to me from then till now.  He was 2.2 preop and postop.  2.4 yesterday after diuretic.  He is improving however and urinating fine.  Creatinine down to 1.67 today.  We will give another dose of Lasix and monitor output  and renal function.    #4 leukocytosis -improved, does not appear septic.      Patient continues to look better.  Will monitor urine output and renal function with another dose of Lasix.  Wean FiO2 as able.  Medicine team will continue to follow along.    Electronically signed by Idris Sandhu DO, 02/11/22, 11:12 CST.

## 2022-02-11 NOTE — PAYOR COMM NOTE
"Fabiola Reynolds (89 y.o. Male) [phone]  Cont stay   Please review clinical  Pt remains in hospital    [facility] phone    Fax               Date of Birth Social Security Number Address Home Phone MRN    09/17/1932  [address] [place] [address]  [id number]    Mandaen Marital Status             Other        Admission Date Admission Type Admitting Provider Attending Provider Department, Room/Bed    2/8/22 Emergency Milly Valverde MD Tyrrell, Dana R, MD [facility] 3C, 378/1    Discharge Date Discharge Disposition Discharge Destination                         Attending Provider: Milly Valverde MD    Allergies: No Known Allergies    Isolation: None   Infection: None   Code Status: No CPR   Advance Care Planning Activity    Ht: 177.8 cm (70\")   Wt: 80.1 kg (176 lb 9.4 oz)    Admission Cmt: None   Principal Problem: Acute appendicitis [K35.80]                 Active Insurance as of 2/8/2022     Primary Coverage     Payor Plan Insurance Group Employer/Plan Group    AETNA MEDICARE REPLACEMENT AETNA MEDICARE REPLACEMENT [id number]     Payor Plan Address Payor Plan Phone Number Payor Plan Fax Number Effective Dates    [address] [phone]  1/1/2018 - None Entered    [facility] [id number]       Subscriber Name Subscriber Birth Date Member ID       FABIOLA REYNOLDS 9/17/1932 [id number]                 Emergency Contacts      (Rel.) Home Phone Work Phone Mobile Phone    rosario escobar (Daughter) -- [phone]              Current Facility-Administered Medications   Medication Dose Route Frequency Provider Last Rate Last Admin   • enoxaparin (LOVENOX) syringe 30 mg  30 mg Subcutaneous Daily Milly Valverde MD   30 mg at 02/11/22 0830   • morphine injection 4 mg  4 mg Intravenous Q3H PRN Milly Valverde MD       • Morphine sulfate (PF) injection 2 mg  2 mg Intravenous Q3H PRN Milly Valverde MD   2 mg at " 02/10/22 2237   • ondansetron (ZOFRAN) injection 4 mg  4 mg Intravenous Q6H PRN Milly Valverde MD   4 mg at 02/09/22 1531   • piperacillin-tazobactam (ZOSYN) in iso-osmotic dextrose IVPB 2.25 g (premix)  2.25 g Intravenous Q8H Milly Valverde MD   2.25 g at 02/11/22 0830        Physician Progress Notes (last 24 hours)      Milly Valverde MD at 02/10/22 1401          Milly Valverde MD Progress Note     LOS: 2 days   Patient Care Team:  Philippe Oliveiar, DO as PCP - General (Family Medicine)        Subjective     Doing better.  Tolerating clear liquids.  Some belching but no vomiting.  No flatus or bowel movements    Objective     Vital Signs  Temp:  [97.3 °F (36.3 °C)-99.5 °F (37.5 °C)] 98.5 °F (36.9 °C)  Heart Rate:  [69-89] 73  Resp:  [17-26] 20  BP: ()/(50-73) 119/61    Intake & Output (last 3 days)       02/07 0701  02/08 0700 02/08 0701  02/09 0700 02/09 0701  02/10 0700 02/10 0701  02/11 0700    I.V. (mL/kg)  1000 (13) 300 (3.7)     IV Piggyback  250 150     Total Intake(mL/kg)  1250 (16.2) 450 (5.6)     Urine (mL/kg/hr)  150 1870 (1) 410 (0.7)    Drains  150 45 10    Total Output  300 1915 420    Net  +950 -1465 -420                  Physical Exam:     General Appearance:    Alert, cooperative, in no acute distress   Lungs:     respirations regular, even and unlabored    Heart:    Regular rhythm and normal rate, normal S1 and S2, no            murmur, no gallop, no rub   Chest Wall:    No abnormalities observed   Abdomen:      Soft full hypoactive bowel sounds   Extremities: No edema,    Results Review:     I reviewed the patient's new clinical results.    Lab Results (last 72 hours)     Procedure Component Value Units Date/Time    Tissue Pathology Exam [[id number]] Collected: 02/08/22 2253    Specimen: Tissue from Large Intestine, Appendix Updated: 02/10/22 0946     Case Report --     Surgical Pathology Report                         Case: [id number]                                  Authorizing  Provider:  Milly Valverde MD        Collected:           02/08/2022 10:53 PM          Ordering Location:     [facility] OR  Received:            02/09/2022 07:05 AM          Pathologist:           Raji Pena MD                                                        Specimen:    Large Intestine, Appendix, Appendix                                                         Final Diagnosis --     Appendix, appendectomy:  Acute appendicitis with periappendicitis.       Gross Description --     Submitted in formalin in a container labeled with the patient's name and date of birth and designated appendix is a vermiform appendix which has a length of 5 cm and a diameter of 1.2 cm.  A moderate amount of attached mesoappendiceal fat is present.  The appendix is partially transected and the proximal and is open.  The serosal surface is roughened and reddish-tan and adherent clotted blood is present present.  The distal tip demonstrates surface fibrinopurulent exudate.  Separately submitted in the container is a 2.2 x 1 x 0.7 cm yellowish-tan fecalith.  The proximal surgical margin is painted with black ink in the department of pathology.  A section from the proximal surgical margin and a representative section of the midportion of the vermiform appendix is submitted in block 1A.  A longitudinal section through the distal tip of the vermiform appendix is submitted in block 1B.  A 1 cm fecalith is identified in the distal vermiform appendix.       Microscopic Description --     Sections demonstrate benign appendiceal wall with extensive acute inflammation.      Basic Metabolic Panel [[id number]]  (Abnormal) Collected: 02/10/22 0351    Specimen: Blood Updated: 02/10/22 0519     Glucose 99 mg/dL      BUN 50 mg/dL      Creatinine 2.38 mg/dL      Sodium 142 mmol/L      Potassium 4.8 mmol/L      Chloride 101 mmol/L      CO2 29.0 mmol/L      Calcium 8.9 mg/dL      eGFR Non African Amer 26 mL/min/1.73       BUN/Creatinine Ratio 21.0     Anion Gap 12.0 mmol/L     Narrative:      GFR Normal >60  Chronic Kidney Disease <60  Kidney Failure <15      Magnesium [[id number]]  (Normal) Collected: 02/10/22 0351    Specimen: Blood Updated: 02/10/22 0519     Magnesium 2.0 mg/dL     Manual Differential [[id number]]  (Abnormal) Collected: 02/10/22 0351    Specimen: Blood Updated: 02/10/22 0510     Neutrophil % 64.0 %      Lymphocyte % 10.0 %      Monocyte % 1.0 %      Bands %  23.0 %      Metamyelocyte % 2.0 %      Neutrophils Absolute 7.38 10*3/mm3      Lymphocytes Absolute 0.85 10*3/mm3      Monocytes Absolute 0.08 10*3/mm3      RBC Morphology Normal     WBC Morphology Normal     Platelet Morphology Normal    CBC & Differential [[id number]]  (Abnormal) Collected: 02/10/22 0351    Specimen: Blood Updated: 02/10/22 0510    Narrative:      The following orders were created for panel order CBC & Differential.  Procedure                               Abnormality         Status                     ---------                               -----------         ------                     CBC Auto Differential[[id number]]        Abnormal            Final result                 Please view results for these tests on the individual orders.    CBC Auto Differential [[id number]]  (Abnormal) Collected: 02/10/22 0351    Specimen: Blood Updated: 02/10/22 0510     WBC 8.48 10*3/mm3      RBC 4.07 10*6/mm3      Hemoglobin 12.1 g/dL      Hematocrit 37.6 %      MCV 92.4 fL      MCH 29.7 pg      MCHC 32.2 g/dL      RDW 13.3 %      RDW-SD 45.7 fl      MPV 10.5 fL      Platelets 289 10*3/mm3     Narrative:      The previously reported component NRBC is no longer being reported. Previous result was 0.0 /100 WBC (Reference Range: 0.0-0.2 /100 WBC) on 2/10/2022 at 0450 CST.    Blood Culture - Blood, Arm, Right [[id number]]  (Normal) Collected: 02/08/22 2145    Specimen: Blood from Arm, Right Updated: 02/09/22 2215     Blood Culture No growth at 24 hours    Blood  Culture - Blood, Arm, Right [[id number]]  (Normal) Collected: 02/08/22 2155    Specimen: Blood from Arm, Right Updated: 02/09/22 2215     Blood Culture No growth at 24 hours    Urinalysis With Microscopic If Indicated (No Culture) - Urine, Clean Catch [[id number]]  (Normal) Collected: 02/09/22 1049    Specimen: Urine, Clean Catch Updated: 02/09/22 1130     Color, UA Yellow     Appearance, UA Clear     pH, UA <=5.0     Specific Gravity, UA 1.010     Glucose, UA Negative     Ketones, UA Negative     Bilirubin, UA Negative     Blood, UA Negative     Protein, UA Negative     Leuk Esterase, UA Negative     Nitrite, UA Negative     Urobilinogen, UA 0.2 E.U./dL    Narrative:      Urine microscopic not indicated.    BNP [[id number]]  (Abnormal) Collected: 02/09/22 0830    Specimen: Blood from Hand, Left Updated: 02/09/22 0935     proBNP 1,964.0 pg/mL     Narrative:      Among patients with dyspnea, NT-proBNP is highly sensitive for the detection of acute congestive heart failure. In addition NT-proBNP of <300 pg/ml effectively rules out acute congestive heart failure with 99% negative predictive value.    Results may be falsely decreased if patient taking Biotin.      Manual Differential [[id number]]  (Abnormal) Collected: 02/09/22 0830    Specimen: Blood from Hand, Left Updated: 02/09/22 0917     Neutrophil % 51.0 %      Lymphocyte % 22.0 %      Monocyte % 4.0 %      Eosinophil % 1.0 %      Bands %  15.0 %      Metamyelocyte % 5.0 %      Atypical Lymphocyte % 2.0 %      Neutrophils Absolute 3.29 10*3/mm3      Lymphocytes Absolute 1.20 10*3/mm3      Monocytes Absolute 0.20 10*3/mm3      Eosinophils Absolute 0.05 10*3/mm3      RBC Morphology Normal     WBC Morphology Normal     Clumped Platelets Present     Giant Platelets Large/3+    Basic Metabolic Panel [[id number]]  (Abnormal) Collected: 02/09/22 0830    Specimen: Blood from Hand, Left Updated: 02/09/22 0915     Glucose 112 mg/dL      BUN 39 mg/dL      Creatinine 2.20 mg/dL       Sodium 140 mmol/L      Potassium 5.1 mmol/L      Chloride 104 mmol/L      CO2 24.0 mmol/L      Calcium 8.7 mg/dL      eGFR Non African Amer 28 mL/min/1.73      BUN/Creatinine Ratio 17.7     Anion Gap 12.0 mmol/L     Narrative:      GFR Normal >60  Chronic Kidney Disease <60  Kidney Failure <15      CBC & Differential [[id number]]  (Abnormal) Collected: 02/09/22 0830    Specimen: Blood from Hand, Left Updated: 02/09/22 0905    Narrative:      The following orders were created for panel order CBC & Differential.  Procedure                               Abnormality         Status                     ---------                               -----------         ------                     CBC Auto Differential[[id number]]        Abnormal            Final result                 Please view results for these tests on the individual orders.    CBC Auto Differential [[id number]]  (Abnormal) Collected: 02/09/22 0830    Specimen: Blood from Hand, Left Updated: 02/09/22 0905     WBC 4.98 10*3/mm3      RBC 4.54 10*6/mm3      Hemoglobin 12.7 g/dL      Hematocrit 40.5 %      MCV 89.2 fL      MCH 28.0 pg      MCHC 31.4 g/dL      RDW 13.6 %      RDW-SD 44.7 fl      MPV 9.9 fL      Platelets 284 10*3/mm3     Blood Gas, Arterial - [[id number]]  (Abnormal) Collected: 02/09/22 0200    Specimen: Arterial Blood Updated: 02/09/22 0220     Site Right Radial     Navi's Test Positive     pH, Arterial 7.358 pH units      pCO2, Arterial 41.7 mm Hg      pO2, Arterial 42.5 mm Hg      Comment: 85 Value below critical limit        HCO3, Arterial 23.4 mmol/L      Base Excess, Arterial -2.1 mmol/L      Comment: 84 Value below reference range        O2 Saturation, Arterial 76.3 %      Comment: 84 Value below reference range        Temperature 37.0 C      Barometric Pressure for Blood Gas 748 mmHg      Modality Simple Mask     Flow Rate 10.0 lpm      Ventilator Mode NA     Notified Who LYNSEY LOBATO RN     Notified By [id number]     Notified Time 02/09/2022  02:07     Collected by [id number]     Comment: Meter: [id number]     :  [id number]        pCO2, Temperature Corrected 41.7 mm Hg      pH, Temp Corrected 7.358 pH Units      pO2, Temperature Corrected 42.5 mm Hg     Lactic Acid, Plasma [[id number]]  (Abnormal) Collected: 02/08/22 2157    Specimen: Blood Updated: 02/08/22 2226     Lactate 3.2 mmol/L     COVID PRE-OP / PRE-PROCEDURE SCREENING ORDER (NO ISOLATION) - Swab, Nasal Cavity [[id number]]  (Normal) Collected: 02/08/22 1956    Specimen: Swab from Nasal Cavity Updated: 02/08/22 2048    Narrative:      The following orders were created for panel order COVID PRE-OP / PRE-PROCEDURE SCREENING ORDER (NO ISOLATION) - Swab, Nasal Cavity.  Procedure                               Abnormality         Status                     ---------                               -----------         ------                     COVID-19,[employer] IN-MIGU[facility]...[[id number]]  Normal              Final result                 Please view results for these tests on the individual orders.    COVID-19,[employer] IN-HOUSE,Nasal Swab No Transport Media 3-4 HR TAT - Swab, Nasal Cavity [[id number]]  (Normal) Collected: 02/08/22 1956    Specimen: Swab from Nasal Cavity Updated: 02/08/22 2048     COVID19 Not Detected    Narrative:      Fact sheet for providers: https://www.fda.gov/media/275650/download     Fact sheet for patients: https://www.fda.gov/media/116418/download    Test performed by PCR.    Consider negative results in combination with clinical observations, patient history, and epidemiological information.    Comprehensive Metabolic Panel [[id number]]  (Abnormal) Collected: 02/08/22 1955    Specimen: Blood Updated: 02/08/22 2022     Glucose 135 mg/dL      BUN 33 mg/dL      Creatinine 2.19 mg/dL      Sodium 142 mmol/L      Potassium 4.7 mmol/L      Chloride 104 mmol/L      CO2 22.0 mmol/L      Calcium 9.8 mg/dL      Total Protein 7.8 g/dL      Albumin 4.30 g/dL      ALT (SGPT) 13 U/L      AST  (SGOT) 15 U/L      Alkaline Phosphatase 39 U/L      Total Bilirubin 1.6 mg/dL      eGFR Non African Amer 28 mL/min/1.73      Globulin 3.5 gm/dL      A/G Ratio 1.2 g/dL      BUN/Creatinine Ratio 15.1     Anion Gap 16.0 mmol/L     Narrative:      GFR Normal >60  Chronic Kidney Disease <60  Kidney Failure <15      Lipase [[id number]]  (Normal) Collected: 02/08/22 1955    Specimen: Blood Updated: 02/08/22 2022     Lipase 15 U/L     CBC & Differential [[id number]]  (Abnormal) Collected: 02/08/22 1956    Specimen: Blood Updated: 02/08/22 2004    Narrative:      The following orders were created for panel order CBC & Differential.  Procedure                               Abnormality         Status                     ---------                               -----------         ------                     CBC Auto Differential[[id number]]        Abnormal            Final result                 Please view results for these tests on the individual orders.    CBC Auto Differential [[id number]]  (Abnormal) Collected: 02/08/22 1956    Specimen: Blood Updated: 02/08/22 2004     WBC 19.04 10*3/mm3      RBC 5.18 10*6/mm3      Hemoglobin 14.4 g/dL      Hematocrit 46.0 %      MCV 88.8 fL      MCH 27.8 pg      MCHC 31.3 g/dL      RDW 13.5 %      RDW-SD 43.9 fl      MPV 9.9 fL      Platelets 384 10*3/mm3      Neutrophil % 87.9 %      Lymphocyte % 4.3 %      Monocyte % 7.1 %      Eosinophil % 0.0 %      Basophil % 0.2 %      Immature Grans % 0.5 %      Neutrophils, Absolute 16.74 10*3/mm3      Lymphocytes, Absolute 0.81 10*3/mm3      Monocytes, Absolute 1.35 10*3/mm3      Eosinophils, Absolute 0.00 10*3/mm3      Basophils, Absolute 0.04 10*3/mm3      Immature Grans, Absolute 0.10 10*3/mm3      nRBC 0.0 /100 WBC         Imaging Results (Last 72 Hours)     Procedure Component Value Units Date/Time    XR Chest 1 View [[id number]] Collected: 02/10/22 0945     Updated: 02/10/22 0950    Narrative:      EXAMINATION: XR CHEST 1 VW- 2/10/2022 9:45 AM  CST     HISTORY: f/u cxr, hypoxia, ? pulm edema; K35.80-Unspecified acute  appendicitis; K37-Unspecified appendicitis; R13.10-Dysphagia,  unspecified.     REPORT: A frontal view of the chest was obtained.     COMPARISON: Chest x-rays 2/19/2022.     The lungs are moderately hypoaerated, there are patchy bibasilar  infiltrates as before without lobar consolidation. Infiltrates appear  slightly improved. No pneumothorax or pleural effusion is identified.  Heart size is normal. There is ectasia of the thoracic aorta. No acute  osseous abnormality is identified.       Impression:      Continued hypoaeration of the lungs with mild improvement in  basilar infiltrates, which are greater on the left. No other change.  This report was finalized on 02/10/2022 09:47 by Dr. Tomy Villanueva MD.    XR Chest 1 View [[id number]] Collected: 02/09/22 0746     Updated: 02/09/22 0751    Narrative:      EXAMINATION: XR CHEST 1 VW-     2/9/2022 2:59 AM CST     HISTORY: persistent low Spo2 though pt fully AAOx3; denies pain, SOB,  c/o; K35.80-Unspecified acute appendicitis; K37-Unspecified appendicitis     1 view chest x-ray.     Comparison is made with an abdomen/pelvis CT performed yesterday at 9:08  PM.     Magnified heart size.     Patchy bibasilar infiltrate.     No pneumothorax.     Summary:  1. Patchy bibasilar infiltrate or atelectasis has developed since the CT  performed last night.  This report was finalized on 02/09/2022 07:48 by Dr. Alfredo Muhammad MD.    CT Abdomen Pelvis Without Contrast [[id number]] Collected: 02/08/22 2121     Updated: 02/08/22 2129    Narrative:      EXAMINATION:   CT ABDOMEN PELVIS WO CONTRAST-  2/8/2022 9:21 PM CST     HISTORY: CT ABDOMEN PELVIS WO CONTRAST- 2/8/2022 9:04 PM CST     HISTORY: abdominal pain      COMPARISON: None      DOSE LENGTH PRODUCT: 253 mGy cm. Automated exposure control was also  utilized to decrease patient radiation dose.     TECHNIQUE: Noncontrast enhanced images of the abdomen and  pelvis  obtained without oral contrast. Multiplanar reformatted images were  provided for review.      FINDINGS:   The lung bases and base of the heart are unremarkable.      LIVER: No focal liver lesion.      BILIARY SYSTEM: The gallbladder is unremarkable. No intrahepatic or  extrahepatic ductal dilatation.      PANCREAS: No focal pancreatic lesion.      SPLEEN: Unremarkable.      KIDNEYS AND ADRENALS: Adrenal glands are visualized. Right renal contour  is unremarkable. Nonobstructing calculi are present lower pole of the  left kidney..  The ureters are decompressed and normal in appearance.     RETROPERITONEUM: No mass, lymphadenopathy or hemorrhage.      GI TRACT: No evidence of obstruction or bowel wall thickening. The  appendix is visualized. The appendix is enlarged. The appendix is  edematous. An appendicolith is present. Consistent with acute  appendicitis. The appendix is approximately 2 cm in diameter.  Diverticulosis is present.     .     OTHER: There is no mesenteric mass, lymphadenopathy or fluid collection.  The osseous structures and soft tissues demonstrate no worrisome  lesions. Vascular calcifications present abdominal aorta and iliac  arteries.      PELVIS: No mass lesion, fluid collection or significant lymphadenopathy  is seen in the pelvis. The urinary bladder is normal in appearance. A  prominent calcifications present possibly associated with the prostate.       Impression:      1. Acute appendicitis.        2. Diverticulosis.        3. Vascular calcifications present abdominal aorta.  4. Left nephrolithiasis     These results were called to Dr. JACKSON 9:25 PM.         This report was finalized on 02/08/2022 21:25 by Dr. Yang Chaudhary MD.              Assessment/Plan       Acute appendicitis    Acute respiratory failure with hypoxia (HCC)    Elevated serum creatinine    Leukocytosis      Okay to transfer to floor.  Patient wants DNR status without reintubation or chest compressions      Milly  FERNIE Valverde MD  02/10/22  14:01 CST        Electronically signed by Milly Valverde MD at 02/10/22 1402         2/10    Active Hospital Problems     Diagnosis     • **Acute appendicitis     • Acute respiratory failure with hypoxia (HCC)     • Elevated serum creatinine     • Leukocytosis           Plan:  #1 acute appendicitis -post IR drain in place.  Antibiotics postop care per surgery.     #2 acute respiratory failure hypoxia -suspect some flash pulmonary edema intraoperatively.  Did well with Lasix yesterday with good urine output nearly 2 L and 1-1/2 L negative for the day.  2D echo with preserved EF no overt mention of diastolic dysfunction.  He is now off CPAP and looks comfortable.  Repeat chest x-ray ordered earlier and personally reviewed.  Looks improved.  Will hold off on further diuretic today and monitor ongoing response.  Continue wean O2 as able.     #3 renal sufficiency patient denies any renal dysfunction history but has previous history reported by some provider of renal insufficiency entered in the chart.  Prior creatinines available from 2018 were around 1.2 but no follow-up labs available to me from then till now.  He was 2.2 preop and postop.  2.4 today after diuretic.  As above we will hold off on further diuretic monitor closely.  Will likely need renal ultrasound some point but deferring until okay with surgery given recent abdominal surgery.  He is urinating without issue.  No blood in urine currently.  Ultrasound is not emergent.     #4 leukocytosis -resolved.  Does not appear septic.     Patient looks a lot better today than yesterday.  From medical standpoint okay to go to the floor when okay with surgery.  Medicine team will continue to follow.     Electronically signed by Idris Sandhu DO, 02/10/22, 08:42 CST.             Revision History    pt transfered to med surg today, O2 in low to mid 90s on 10 L high flow NC; SCD's IV abx; safety maintained    2/11   Pt medicated for c/o pain  x1 thus far this shift. IV abx infused per order, otherwise IID in place. Minimal drainage noted in KANG. No c/o n/v thus far this shift. Sánchez in place with output noted. O2 @ 10L high flow NC maintained in place with sats maintained above 90%. Resting between care. VSS. Safety maintained.

## 2022-02-11 NOTE — PLAN OF CARE
Goal Outcome Evaluation:  Plan of Care Reviewed With: patient           Outcome Summary: pt transfered to med surg today, O2 in low to mid 90s on 10 L high flow NC; SCD's IV abx; safety maintained

## 2022-02-11 NOTE — PLAN OF CARE
Goal Outcome Evaluation:  Plan of Care Reviewed With: patient           Outcome Summary: pt denies pain and nausea; O2 at 6L staying around 94%, ambulated in diop and up in chair; safety maintained

## 2022-02-11 NOTE — PLAN OF CARE
Goal Outcome Evaluation:  Plan of Care Reviewed With: patient  Progress: no change      Pt medicated for c/o pain x1 thus far this shift. IV abx infused per order, otherwise IID in place. Minimal drainage noted in KANG. No c/o n/v thus far this shift. Sánchez in place with output noted. O2 @ 10L high flow NC maintained in place with sats maintained above 90%. Resting between care. VSS. Safety maintained.

## 2022-02-11 NOTE — PROGRESS NOTES
Milly Valverde MD Progress Note     LOS: 3 days   Patient Care Team:  Philippe Oliveira DO as PCP - General (Family Medicine)        Subjective     No flatus or bowel movement yet.  Ambulating well.  Taking clear liquids slowly    Objective     Vital Signs  Temp:  [97.9 °F (36.6 °C)-98.8 °F (37.1 °C)] 98.1 °F (36.7 °C)  Heart Rate:  [62-80] 75  Resp:  [16-20] 20  BP: (118-147)/(65-79) 136/65    Intake & Output (last 3 days)       02/08 0701  02/09 0700 02/09 0701  02/10 0700 02/10 0701 02/11 0700 02/11 0701 02/12 0700    P.O.   160     I.V. (mL/kg) 1000 (13) 300 (3.7)      IV Piggyback 250 150      Total Intake(mL/kg) 1250 (16.2) 450 (5.6) 160 (2)     Urine (mL/kg/hr) 150 1870 (1) 1710 (0.9)     Drains 150 45 30 100    Total Output 300 1915 1740 100    Net +950 -1465 -1580 -100                  Physical Exam:     General Appearance:    Alert, cooperative, in no acute distress   Lungs:     respirations regular, even and unlabored    Heart:    Regular rhythm and normal rate, normal S1 and S2, no            murmur, no gallop, no rub   Chest Wall:    No abnormalities observed   Abdomen:      Soft a little full KANG drain mostly serous   Extremities: No edema,    Results Review:     I reviewed the patient's new clinical results.    Lab Results (last 72 hours)     Procedure Component Value Units Date/Time    Basic Metabolic Panel [[id number]]  (Abnormal) Collected: 02/11/22 0515    Specimen: Blood Updated: 02/11/22 0606     Glucose 113 mg/dL      BUN 49 mg/dL      Creatinine 1.67 mg/dL      Sodium 142 mmol/L      Potassium 4.6 mmol/L      Chloride 101 mmol/L      CO2 31.0 mmol/L      Calcium 9.3 mg/dL      eGFR Non African Amer 39 mL/min/1.73      BUN/Creatinine Ratio 29.3     Anion Gap 10.0 mmol/L     Narrative:      GFR Normal >60  Chronic Kidney Disease <60  Kidney Failure <15      CBC & Differential [[id number]]  (Abnormal) Collected: 02/11/22 0515    Specimen: Blood Updated: 02/11/22 0550    Narrative:      The  following orders were created for panel order CBC & Differential.  Procedure                               Abnormality         Status                     ---------                               -----------         ------                     CBC Auto Differential[[id number]]        Abnormal            Final result                 Please view results for these tests on the individual orders.    CBC Auto Differential [[id number]]  (Abnormal) Collected: 02/11/22 0515    Specimen: Blood Updated: 02/11/22 0550     WBC 12.40 10*3/mm3      RBC 4.82 10*6/mm3      Hemoglobin 13.2 g/dL      Hematocrit 43.5 %      MCV 90.2 fL      MCH 27.4 pg      MCHC 30.3 g/dL      RDW 13.2 %      RDW-SD 43.3 fl      MPV 10.3 fL      Platelets 387 10*3/mm3      Neutrophil % 82.9 %      Lymphocyte % 7.4 %      Monocyte % 7.6 %      Eosinophil % 1.2 %      Basophil % 0.3 %      Immature Grans % 0.6 %      Neutrophils, Absolute 10.27 10*3/mm3      Lymphocytes, Absolute 0.92 10*3/mm3      Monocytes, Absolute 0.94 10*3/mm3      Eosinophils, Absolute 0.15 10*3/mm3      Basophils, Absolute 0.04 10*3/mm3      Immature Grans, Absolute 0.08 10*3/mm3      nRBC 0.0 /100 WBC     Blood Culture - Blood, Arm, Right [[id number]]  (Normal) Collected: 02/08/22 2145    Specimen: Blood from Arm, Right Updated: 02/10/22 2215     Blood Culture No growth at 2 days    Blood Culture - Blood, Arm, Right [[id number]]  (Normal) Collected: 02/08/22 2155    Specimen: Blood from Arm, Right Updated: 02/10/22 2215     Blood Culture No growth at 2 days    Tissue Pathology Exam [[id number]] Collected: 02/08/22 2253    Specimen: Tissue from Large Intestine, Appendix Updated: 02/10/22 0946     Case Report --     Surgical Pathology Report                         Case: [id number]                                  Authorizing Provider:  Milly Valverde MD        Collected:           02/08/2022 10:53 PM          Ordering Location:     [facility] OR  Received:             02/09/2022 07:05 AM          Pathologist:           Raji Pena MD                                                        Specimen:    Large Intestine, Appendix, Appendix                                                         Final Diagnosis --     Appendix, appendectomy:  Acute appendicitis with periappendicitis.       Gross Description --     Submitted in formalin in a container labeled with the patient's name and date of birth and designated appendix is a vermiform appendix which has a length of 5 cm and a diameter of 1.2 cm.  A moderate amount of attached mesoappendiceal fat is present.  The appendix is partially transected and the proximal and is open.  The serosal surface is roughened and reddish-tan and adherent clotted blood is present present.  The distal tip demonstrates surface fibrinopurulent exudate.  Separately submitted in the container is a 2.2 x 1 x 0.7 cm yellowish-tan fecalith.  The proximal surgical margin is painted with black ink in the department of pathology.  A section from the proximal surgical margin and a representative section of the midportion of the vermiform appendix is submitted in block 1A.  A longitudinal section through the distal tip of the vermiform appendix is submitted in block 1B.  A 1 cm fecalith is identified in the distal vermiform appendix.       Microscopic Description --     Sections demonstrate benign appendiceal wall with extensive acute inflammation.      Basic Metabolic Panel [[id number]]  (Abnormal) Collected: 02/10/22 0351    Specimen: Blood Updated: 02/10/22 0519     Glucose 99 mg/dL      BUN 50 mg/dL      Creatinine 2.38 mg/dL      Sodium 142 mmol/L      Potassium 4.8 mmol/L      Chloride 101 mmol/L      CO2 29.0 mmol/L      Calcium 8.9 mg/dL      eGFR Non African Amer 26 mL/min/1.73      BUN/Creatinine Ratio 21.0     Anion Gap 12.0 mmol/L     Narrative:      GFR Normal >60  Chronic Kidney Disease <60  Kidney Failure <15      Magnesium [[id number]]   (Normal) Collected: 02/10/22 0351    Specimen: Blood Updated: 02/10/22 0519     Magnesium 2.0 mg/dL     Manual Differential [[id number]]  (Abnormal) Collected: 02/10/22 0351    Specimen: Blood Updated: 02/10/22 0510     Neutrophil % 64.0 %      Lymphocyte % 10.0 %      Monocyte % 1.0 %      Bands %  23.0 %      Metamyelocyte % 2.0 %      Neutrophils Absolute 7.38 10*3/mm3      Lymphocytes Absolute 0.85 10*3/mm3      Monocytes Absolute 0.08 10*3/mm3      RBC Morphology Normal     WBC Morphology Normal     Platelet Morphology Normal    CBC & Differential [[id number]]  (Abnormal) Collected: 02/10/22 0351    Specimen: Blood Updated: 02/10/22 0510    Narrative:      The following orders were created for panel order CBC & Differential.  Procedure                               Abnormality         Status                     ---------                               -----------         ------                     CBC Auto Differential[[id number]]        Abnormal            Final result                 Please view results for these tests on the individual orders.    CBC Auto Differential [[id number]]  (Abnormal) Collected: 02/10/22 0351    Specimen: Blood Updated: 02/10/22 0510     WBC 8.48 10*3/mm3      RBC 4.07 10*6/mm3      Hemoglobin 12.1 g/dL      Hematocrit 37.6 %      MCV 92.4 fL      MCH 29.7 pg      MCHC 32.2 g/dL      RDW 13.3 %      RDW-SD 45.7 fl      MPV 10.5 fL      Platelets 289 10*3/mm3     Narrative:      The previously reported component NRBC is no longer being reported. Previous result was 0.0 /100 WBC (Reference Range: 0.0-0.2 /100 WBC) on 2/10/2022 at 0450 CST.    Urinalysis With Microscopic If Indicated (No Culture) - Urine, Clean Catch [[id number]]  (Normal) Collected: 02/09/22 1049    Specimen: Urine, Clean Catch Updated: 02/09/22 1130     Color, UA Yellow     Appearance, UA Clear     pH, UA <=5.0     Specific Gravity, UA 1.010     Glucose, UA Negative     Ketones, UA Negative     Bilirubin, UA Negative      Blood, UA Negative     Protein, UA Negative     Leuk Esterase, UA Negative     Nitrite, UA Negative     Urobilinogen, UA 0.2 E.U./dL    Narrative:      Urine microscopic not indicated.    BNP [[id number]]  (Abnormal) Collected: 02/09/22 0830    Specimen: Blood from Hand, Left Updated: 02/09/22 0935     proBNP 1,964.0 pg/mL     Narrative:      Among patients with dyspnea, NT-proBNP is highly sensitive for the detection of acute congestive heart failure. In addition NT-proBNP of <300 pg/ml effectively rules out acute congestive heart failure with 99% negative predictive value.    Results may be falsely decreased if patient taking Biotin.      Manual Differential [[id number]]  (Abnormal) Collected: 02/09/22 0830    Specimen: Blood from Hand, Left Updated: 02/09/22 0917     Neutrophil % 51.0 %      Lymphocyte % 22.0 %      Monocyte % 4.0 %      Eosinophil % 1.0 %      Bands %  15.0 %      Metamyelocyte % 5.0 %      Atypical Lymphocyte % 2.0 %      Neutrophils Absolute 3.29 10*3/mm3      Lymphocytes Absolute 1.20 10*3/mm3      Monocytes Absolute 0.20 10*3/mm3      Eosinophils Absolute 0.05 10*3/mm3      RBC Morphology Normal     WBC Morphology Normal     Clumped Platelets Present     Giant Platelets Large/3+    Basic Metabolic Panel [[id number]]  (Abnormal) Collected: 02/09/22 0830    Specimen: Blood from Hand, Left Updated: 02/09/22 0915     Glucose 112 mg/dL      BUN 39 mg/dL      Creatinine 2.20 mg/dL      Sodium 140 mmol/L      Potassium 5.1 mmol/L      Chloride 104 mmol/L      CO2 24.0 mmol/L      Calcium 8.7 mg/dL      eGFR Non African Amer 28 mL/min/1.73      BUN/Creatinine Ratio 17.7     Anion Gap 12.0 mmol/L     Narrative:      GFR Normal >60  Chronic Kidney Disease <60  Kidney Failure <15      CBC & Differential [[id number]]  (Abnormal) Collected: 02/09/22 0830    Specimen: Blood from Hand, Left Updated: 02/09/22 0905    Narrative:      The following orders were created for panel order CBC &  Differential.  Procedure                               Abnormality         Status                     ---------                               -----------         ------                     CBC Auto Differential[[id number]]        Abnormal            Final result                 Please view results for these tests on the individual orders.    CBC Auto Differential [[id number]]  (Abnormal) Collected: 02/09/22 0830    Specimen: Blood from Hand, Left Updated: 02/09/22 0905     WBC 4.98 10*3/mm3      RBC 4.54 10*6/mm3      Hemoglobin 12.7 g/dL      Hematocrit 40.5 %      MCV 89.2 fL      MCH 28.0 pg      MCHC 31.4 g/dL      RDW 13.6 %      RDW-SD 44.7 fl      MPV 9.9 fL      Platelets 284 10*3/mm3     Blood Gas, Arterial - [[id number]]  (Abnormal) Collected: 02/09/22 0200    Specimen: Arterial Blood Updated: 02/09/22 0220     Site Right Radial     Navi's Test Positive     pH, Arterial 7.358 pH units      pCO2, Arterial 41.7 mm Hg      pO2, Arterial 42.5 mm Hg      Comment: 85 Value below critical limit        HCO3, Arterial 23.4 mmol/L      Base Excess, Arterial -2.1 mmol/L      Comment: 84 Value below reference range        O2 Saturation, Arterial 76.3 %      Comment: 84 Value below reference range        Temperature 37.0 C      Barometric Pressure for Blood Gas 748 mmHg      Modality Simple Mask     Flow Rate 10.0 lpm      Ventilator Mode NA     Notified Who LYNSEY LOBATO RN     Notified By [id number]     Notified Time 02/09/2022 02:07     Collected by [id number]     Comment: Meter: [id number]     :  [id number]        pCO2, Temperature Corrected 41.7 mm Hg      pH, Temp Corrected 7.358 pH Units      pO2, Temperature Corrected 42.5 mm Hg     Lactic Acid, Plasma [[id number]]  (Abnormal) Collected: 02/08/22 2157    Specimen: Blood Updated: 02/08/22 2226     Lactate 3.2 mmol/L     COVID PRE-OP / PRE-PROCEDURE SCREENING ORDER (NO ISOLATION) - Swab, Nasal Cavity [[id number]]  (Normal) Collected: 02/08/22 1956    Specimen:  Swab from Nasal Cavity Updated: 02/08/22 2048    Narrative:      The following orders were created for panel order COVID PRE-OP / PRE-PROCEDURE SCREENING ORDER (NO ISOLATION) - Swab, Nasal Cavity.  Procedure                               Abnormality         Status                     ---------                               -----------         ------                     COVID-19,[employer] [facility]...[[id number]]  Normal              Final result                 Please view results for these tests on the individual orders.    COVID-19,[facility] IN-HOUSE,Nasal Swab No Transport Media 3-4 HR TAT - Swab, Nasal Cavity [[id number]]  (Normal) Collected: 02/08/22 1956    Specimen: Swab from Nasal Cavity Updated: 02/08/22 2048     COVID19 Not Detected    Narrative:      Fact sheet for providers: https://www.fda.gov/media/324933/download     Fact sheet for patients: https://www.fda.gov/media/060089/download    Test performed by PCR.    Consider negative results in combination with clinical observations, patient history, and epidemiological information.    Comprehensive Metabolic Panel [[id number]]  (Abnormal) Collected: 02/08/22 1955    Specimen: Blood Updated: 02/08/22 2022     Glucose 135 mg/dL      BUN 33 mg/dL      Creatinine 2.19 mg/dL      Sodium 142 mmol/L      Potassium 4.7 mmol/L      Chloride 104 mmol/L      CO2 22.0 mmol/L      Calcium 9.8 mg/dL      Total Protein 7.8 g/dL      Albumin 4.30 g/dL      ALT (SGPT) 13 U/L      AST (SGOT) 15 U/L      Alkaline Phosphatase 39 U/L      Total Bilirubin 1.6 mg/dL      eGFR Non African Amer 28 mL/min/1.73      Globulin 3.5 gm/dL      A/G Ratio 1.2 g/dL      BUN/Creatinine Ratio 15.1     Anion Gap 16.0 mmol/L     Narrative:      GFR Normal >60  Chronic Kidney Disease <60  Kidney Failure <15      Lipase [[id number]]  (Normal) Collected: 02/08/22 1955    Specimen: Blood Updated: 02/08/22 2022     Lipase 15 U/L     CBC & Differential [[id number]]  (Abnormal) Collected: 02/08/22 1956     [Follow-Up Visit] : a follow-up visit for Specimen: Blood Updated: 02/08/22 2004    Narrative:      The following orders were created for panel order CBC & Differential.  Procedure                               Abnormality         Status                     ---------                               -----------         ------                     CBC Auto Differential[[id number]]        Abnormal            Final result                 Please view results for these tests on the individual orders.    CBC Auto Differential [[id number]]  (Abnormal) Collected: 02/08/22 1956    Specimen: Blood Updated: 02/08/22 2004     WBC 19.04 10*3/mm3      RBC 5.18 10*6/mm3      Hemoglobin 14.4 g/dL      Hematocrit 46.0 %      MCV 88.8 fL      MCH 27.8 pg      MCHC 31.3 g/dL      RDW 13.5 %      RDW-SD 43.9 fl      MPV 9.9 fL      Platelets 384 10*3/mm3      Neutrophil % 87.9 %      Lymphocyte % 4.3 %      Monocyte % 7.1 %      Eosinophil % 0.0 %      Basophil % 0.2 %      Immature Grans % 0.5 %      Neutrophils, Absolute 16.74 10*3/mm3      Lymphocytes, Absolute 0.81 10*3/mm3      Monocytes, Absolute 1.35 10*3/mm3      Eosinophils, Absolute 0.00 10*3/mm3      Basophils, Absolute 0.04 10*3/mm3      Immature Grans, Absolute 0.10 10*3/mm3      nRBC 0.0 /100 WBC         Imaging Results (Last 72 Hours)     Procedure Component Value Units Date/Time    XR Chest 1 View [[id number]] Collected: 02/10/22 0945     Updated: 02/10/22 0950    Narrative:      EXAMINATION: XR CHEST 1 VW- 2/10/2022 9:45 AM CST     HISTORY: f/u cxr, hypoxia, ? pulm edema; K35.80-Unspecified acute  appendicitis; K37-Unspecified appendicitis; R13.10-Dysphagia,  unspecified.     REPORT: A frontal view of the chest was obtained.     COMPARISON: Chest x-rays 2/19/2022.     The lungs are moderately hypoaerated, there are patchy bibasilar  infiltrates as before without lobar consolidation. Infiltrates appear  slightly improved. No pneumothorax or pleural effusion is identified.  Heart size is normal. There is ectasia of the  thoracic aorta. No acute  osseous abnormality is identified.       Impression:      Continued hypoaeration of the lungs with mild improvement in  basilar infiltrates, which are greater on the left. No other change.  This report was finalized on 02/10/2022 09:47 by Dr. Tomy Villanueva MD.    XR Chest 1 View [[id number]] Collected: 02/09/22 0746     Updated: 02/09/22 0751    Narrative:      EXAMINATION: XR CHEST 1 VW-     2/9/2022 2:59 AM CST     HISTORY: persistent low Spo2 though pt fully AAOx3; denies pain, SOB,  c/o; K35.80-Unspecified acute appendicitis; K37-Unspecified appendicitis     1 view chest x-ray.     Comparison is made with an abdomen/pelvis CT performed yesterday at 9:08  PM.     Magnified heart size.     Patchy bibasilar infiltrate.     No pneumothorax.     Summary:  1. Patchy bibasilar infiltrate or atelectasis has developed since the CT  performed last night.  This report was finalized on 02/09/2022 07:48 by Dr. Alfredo Muhammad MD.    CT Abdomen Pelvis Without Contrast [[id number]] Collected: 02/08/22 2121     Updated: 02/08/22 2129    Narrative:      EXAMINATION:   CT ABDOMEN PELVIS WO CONTRAST-  2/8/2022 9:21 PM CST     HISTORY: CT ABDOMEN PELVIS WO CONTRAST- 2/8/2022 9:04 PM CST     HISTORY: abdominal pain      COMPARISON: None      DOSE LENGTH PRODUCT: 253 mGy cm. Automated exposure control was also  utilized to decrease patient radiation dose.     TECHNIQUE: Noncontrast enhanced images of the abdomen and pelvis  obtained without oral contrast. Multiplanar reformatted images were  provided for review.      FINDINGS:   The lung bases and base of the heart are unremarkable.      LIVER: No focal liver lesion.      BILIARY SYSTEM: The gallbladder is unremarkable. No intrahepatic or  extrahepatic ductal dilatation.      PANCREAS: No focal pancreatic lesion.      SPLEEN: Unremarkable.      KIDNEYS AND ADRENALS: Adrenal glands are visualized. Right renal contour  is unremarkable. Nonobstructing calculi are  present lower pole of the  left kidney..  The ureters are decompressed and normal in appearance.     RETROPERITONEUM: No mass, lymphadenopathy or hemorrhage.      GI TRACT: No evidence of obstruction or bowel wall thickening. The  appendix is visualized. The appendix is enlarged. The appendix is  edematous. An appendicolith is present. Consistent with acute  appendicitis. The appendix is approximately 2 cm in diameter.  Diverticulosis is present.     .     OTHER: There is no mesenteric mass, lymphadenopathy or fluid collection.  The osseous structures and soft tissues demonstrate no worrisome  lesions. Vascular calcifications present abdominal aorta and iliac  arteries.      PELVIS: No mass lesion, fluid collection or significant lymphadenopathy  is seen in the pelvis. The urinary bladder is normal in appearance. A  prominent calcifications present possibly associated with the prostate.       Impression:      1. Acute appendicitis.        2. Diverticulosis.        3. Vascular calcifications present abdominal aorta.  4. Left nephrolithiasis     These results were called to Dr. JACKSON 9:25 PM.         This report was finalized on 02/08/2022 21:25 by Dr. Yang Chaudhary MD.              Assessment/Plan       Acute appendicitis    Acute respiratory failure with hypoxia (HCC)    Elevated serum creatinine    Leukocytosis      Continue IV antibiotics and clear liquid slowly.  White count up today which will bear watching.  His renal function has improved to some degree.  We will continue to monitor that as well.  He will be here through the weekend at a minimum      Milly Valverde MD  02/11/22  13:20 CST

## 2022-02-11 NOTE — THERAPY DISCHARGE NOTE
Acute Care - Speech Language Pathology   Swallow Treatment Note/Discharge  [place]     Patient Name: Kj Cerrato  : 1932  MRN: [id number]  Today's Date: 2022               Admit Date: 2022    Goal Outcome Evaluation:    Diet tolerance completed. Pt upright in bed and alert. Daughter present during treatment. RN reports clear liquid diet still in order as abdomen continues to heal after surgery. Thin liquid PO trial  x5 provided. Pt requested to drink out of cup rather than straw. No overt s/s aspiration observed. Pt does have chronic dysphagia for a previous CVA. Diet recommendations include thin liquids and clear liquids until cleared for regular solids. Education on importance of oral care to prevent aspiration pneumonia provided, in addition to education on general aspiration precautions. Pt is aware of chronic dysphagia and utilizes compensatory strategies independently. SLP signing off. Please re consult if any other problems/concerns arise.     Randi Cano, Speech Therapy Student.  14:39 CST  22      Plan of Care Reviewed With: (P) patient        Progress: (P) improving    Visit Dx:    ICD-10-CM ICD-9-CM   1. Acute appendicitis, unspecified acute appendicitis type  K35.80 540.9   2. Appendicitis  K37 541   3. Dysphagia, unspecified type  R13.10 787.20     Patient Active Problem List   Diagnosis    Renal artery stenosis (HCC)    Aortoiliac occlusive disease (HCC)    Bilateral carotid artery stenosis    Acute appendicitis    Acute respiratory failure with hypoxia (HCC)    Elevated serum creatinine    Leukocytosis     Past Medical History:   Diagnosis Date    Atherosclerosis     BPH (benign prostatic hyperplasia)     Carotid artery disease (HCC)     High cholesterol     Hypertension     Kidney cysts     Kidney stone     Renal artery stenosis (HCC)     Renal artery stenosis (HCC) 2018    Renal insufficiency     Solitary lung nodule     Stroke (HCC)     Residual speech problems.      Past Surgical History:   Procedure Laterality Date    APPENDECTOMY N/A 2/8/2022    Procedure: APPENDECTOMY LAPAROSCOPIC;  Surgeon: Milly Valverde MD;  Location: [facility] OR;  Service: General;  Laterality: N/A;    CAROTID ENDARTERECTOMY Right 2009    Per Dr Idris Dotson    PROSTATE SURGERY         SLP Recommendation and Plan                                                                     SWALLOW EVALUATION (last 72 hours)       SLP Adult Swallow Evaluation       Row Name 02/11/22 0935 02/10/22 0850                Rehab Evaluation    Document Type therapy note (daily note)  -MM (r) ER (t) MM (c) evaluation  -MM (r) ER (t) MM (c)       Subjective Information no complaints  -MM (r) ER (t) MM (c) no complaints  -MM (r) ER (t) MM (c)       Patient Observations alert; cooperative; agree to therapy  -MM (r) ER (t) MM (c) alert; cooperative; agree to therapy  -MM (r) ER (t) MM (c)       Patient/Family/Caregiver Comments/Observations daughter present  -MM (r) ER (t) MM (c) no family present  -MM (r) ER (t) MM (c)       Patient Effort good  -MM (r) ER (t) MM (c) good  -MM (r) ER (t) MM (c)                General Information       Patient Profile Reviewed -- yes  -MM (r) ER (t) MM (c)       Pertinent History Of Current Problem -- Hx: stroke, CAD, hypertension, lung nodule, dyphagia s/p CVA, abdominal pain  -MM (r) ER (t) MM (c)       Current Method of Nutrition -- clear liquids  -MM (r) ER (t) MM (c)       Precautions/Limitations, Vision -- WFL; for purposes of eval  -MM (r) ER (t) MM (c)       Precautions/Limitations, Hearing -- WFL; for purposes of eval  -MM (r) ER (t) MM (c)       Prior Level of Function-Communication -- motor speech impairment  slurred speech from prior stroke  -MM       Prior Level of Function-Swallowing -- unknown  some difficulty from prior stroke but reg/thin at home  -MM       Plans/Goals Discussed with -- patient  -MM (r) ER (t) MM (c)       Barriers to Rehab -- none identified  -MM (r) ER  (t) MM (c)                Pain       Additional Documentation Pain Scale: FACES Pre/Post-Treatment (Group)  -MM (r) ER (t) MM (c) Pain Scale: FACES Pre/Post-Treatment (Group)  -MM (r) ER (t) MM (c)                Pain Scale: FACES Pre/Post-Treatment       Pain: FACES Scale, Pretreatment 0-->no hurt  -MM (r) ER (t) MM (c) 0-->no hurt  -MM (r) ER (t) MM (c)       Posttreatment Pain Rating 0-->no hurt  -MM (r) ER (t) MM (c) 0-->no hurt  -MM (r) ER (t) MM (c)                Oral Motor Structure and Function       Oral Lesions or Structural Abnormalities and/or variants -- WNL  -MM (r) ER (t) MM (c)       Dentition Assessment -- teeth are in poor condition  -MM (r) ER (t) MM (c)       Secretion Management -- wet vocal quality  -MM (r) ER (t) MM (c)       Volitional Swallow -- WFL  -MM (r) ER (t) MM (c)                Oral Musculature and Cranial Nerve Assessment       Oral Motor General Assessment -- WFL  -MM (r) ER (t) MM (c)                General Eating/Swallowing Observations       Respiratory Support Currently in Use nasal cannula  -MM (r) ER (t) MM (c) room air  -MM (r) ER (t) MM (c)       Eating/Swallowing Skills self-fed; fed by SLP  -MM (r) ER (t) MM (c) self-fed; fed by SLP  -MM (r) ER (t) MM (c)       Positioning During Eating upright in bed  -MM (r) ER (t) MM (c) upright in bed  -MM (r) ER (t) MM (c)       Utensils Used cup  -MM (r) ER (t) MM (c) spoon; straw  -MM (r) ER (t) MM (c)       Consistencies Trialed thin liquids  -MM (r) ER (t) MM (c) pudding thick; honey-thick liquids; nectar/syrup-thick liquids; thin liquids  -MM (r) ER (t) MM (c)                Clinical Swallow Eval       Oral Prep Phase -- WFL  -MM (r) ER (t) MM (c)       Oral Transit -- WFL  -MM (r) ER (t) MM (c)       Oral Residue -- WFL  -MM (r) ER (t) MM (c)       Pharyngeal Phase -- suspected pharyngeal impairment  -MM (r) ER (t) MM (c)       Esophageal Phase -- unremarkable  -MM (r) ER (t) MM (c)       Clinical Swallow Evaluation Summary  -- See note  -MM (r) ER (t) MM (c)                Pharyngeal Phase Concerns       Pharyngeal Phase Concerns -- wet vocal quality; throat clear; cough  -MM (r) ER (t) MM (c)       Wet Vocal Quality -- thin  -MM (r) ER (t) MM (c)       Cough -- thin  -MM (r) ER (t) MM (c)       Throat Clear -- thin  -MM (r) ER (t) MM (c)       Pharyngeal Phase Concerns, Comment -- See note  -MM (r) ER (t) MM (c)                SLP Evaluation Clinical Impression       SLP Swallowing Diagnosis -- R/O pharyngeal dysphagia  -MM (r) ER (t) MM (c)       Functional Impact -- risk of aspiration/pneumonia  -MM (r) ER (t) MM (c)       Rehab Potential/Prognosis, Swallowing -- good, to achieve stated therapy goals  -MM (r) ER (t) MM (c)       Swallow Criteria for Skilled Therapeutic Interventions Met -- demonstrates skilled criteria  -MM (r) ER (t) MM (c)                SLP Treatment Clinical Impressions       Treatment Assessment (SLP) See note  -MM (r) ER (t) MM (c) --       Daily Summary of Progress (SLP) progress toward functional goals is good  -MM (r) ER (t) MM (c) --       Barriers to Overall Progress (SLP) Other (see comments)  N/a  -MM (r) ER (t) MM (c) --       Plan for Continued Treatment (SLP) treatment no longer indicated as all goals met  -MM (r) ER (t) MM (c) --       Care Plan Review risks/benefits reviewed; care plan/treatment goals reviewed  -MM (r) ER (t) MM (c) --       Care Plan Review, Other Participant(s) daughter  -MM (r) ER (t) MM (c) --                Recommendations       Therapy Frequency (Swallow) -- PRN  -MM (r) ER (t) MM (c)       Predicted Duration Therapy Intervention (Days) -- until discharge  -MM (r) ER (t) MM (c)       SLP Diet Recommendation -- clear liquid diet; thin liquids  -MM (r) ER (t) MM (c)       Recommended Precautions and Strategies -- general aspiration precautions; alternate between small bites of food and sips of liquid; small bites of food and sips of liquid; upright posture during/after eating   -MM (r) ER (t) MM (c)       Oral Care Recommendations -- Oral Care BID/PRN  -MM (r) ER (t) MM (c)       SLP Rec. for Method of Medication Administration -- as tolerated  -MM (r) ER (t) MM (c)       Monitor for Signs of Aspiration -- yes; notify SLP if any concerns  -MM (r) ER (t) MM (c)       Anticipated Discharge Disposition (SLP) -- unknown  -MM (r) ER (t) MM (c)                Swallow Goals (SLP)       Oral Nutrition/Hydration Goal Selection (SLP) oral nutrition/hydration, SLP goal 1  -MM (r) ER (t) MM (c) oral nutrition/hydration, SLP goal 1  -MM (r) ER (t) MM (c)                Oral Nutrition/Hydration Goal 1 (SLP)       Oral Nutrition/Hydration Goal 1, SLP pt will tolerate LRD w/o any overt s/s of aspiration  -MM (r) ER (t) MM (c) pt will tolerate LRD w/o any overt s/s of aspiration  -MM (r) ER (t) MM (c)       Time Frame (Oral Nutrition/Hydration Goal 1, SLP) by discharge  -MM (r) ER (t) MM (c) by discharge  -MM (r) ER (t) MM (c)       Barriers (Oral Nutrition/Hydration Goal 1, SLP) n/a  -MM (r) ER (t) MM (c) n/a  -MM (r) ER (t) MM (c)       Progress/Outcomes (Oral Nutrition/Hydration Goal 1, SLP) goal met  -MM (r) ER (t) MM (c) goal ongoing  -MM (r) ER (t) MM (c)               User Key  (r) = Recorded By, (t) = Taken By, (c) = Cosigned By      Initials Name Effective Dates    Martine Persaud, MS CCC-SLP 06/16/21 -     Randi Campuzano, Speech Therapy Student 01/06/22 -                     EDUCATION  The patient has been educated in the following areas:   Dysphagia (Swallowing Impairment).         SLP GOALS       Row Name 02/11/22 0935 02/10/22 0850          Oral Nutrition/Hydration Goal 1 (SLP)    Oral Nutrition/Hydration Goal 1, SLP pt will tolerate LRD w/o any overt s/s of aspiration  -MM (r) ER (t) MM (c) pt will tolerate LRD w/o any overt s/s of aspiration  -MM (r) ER (t) MM (c)     Time Frame (Oral Nutrition/Hydration Goal 1, SLP) by discharge  -MM (r) ER (t) MM (c) by discharge  -MM (r) ER (t) MM  (c)     Barriers (Oral Nutrition/Hydration Goal 1, SLP) n/a  -MM (r) ER (t) MM (c) n/a  -MM (r) ER (t) MM (c)     Progress/Outcomes (Oral Nutrition/Hydration Goal 1, SLP) goal met  -MM (r) ER (t) MM (c) goal ongoing  -MM (r) ER (t) MM (c)               User Key  (r) = Recorded By, (t) = Taken By, (c) = Cosigned By      Initials Name Provider Type    Martine Persaud MS CCC-SLP Speech and Language Pathologist    Randi Campuzano, Speech Therapy Student Speech Therapy Student                          Time Calculation:    Time Calculation- SLP       Row Name 02/11/22 0935             Time Calculation- SLP    SLP Start Time 0935  -MM (r) ER (t) MM (c)      SLP Stop Time 1015  -MM (r) ER (t) MM (c)      SLP Time Calculation (min) 40 min  -MM (r) ER (t)      SLP Received On 02/11/22  -MM (r) ER (t) MM (c)              Untimed Charges      SLP Eval/Re-eval  ST Eval Oral Pharyng Swallow - 20154  -MM (r) ER (t) MM (c)      43707-SX Eval Oral Pharyng Swallow Minutes --  -MM      94004-BQ Treatment Swallow Minutes 40  -MM              Total Minutes      Untimed Charges Total Minutes 40  -MM       Total Minutes 40  -MM              User Key  (r) = Recorded By, (t) = Taken By, (c) = Cosigned By      Initials Name Provider Type    Martine Persaud MS CCC-SLP Speech and Language Pathologist    Randi Campuzano, Speech Therapy Student Speech Therapy Student                    Therapy Charges for Today       Code Description Service Date Service Provider Modifiers Qty    [id number] HC ST EVAL ORAL PHARYNG SWALLOW 4 2/10/2022 Martine Mcknight MS CCC-SLP GN 1    [id number] HC ST TREATMENT SWALLOW 3 2/11/2022 Martine Mcknight MS CCC-SLP GN 1                      MS ELIZA Montes  2/11/2022

## 2022-02-12 LAB
ANION GAP SERPL CALCULATED.3IONS-SCNC: 9 MMOL/L (ref 5–15)
BASOPHILS # BLD AUTO: 0.04 10*3/MM3 (ref 0–0.2)
BASOPHILS NFR BLD AUTO: 0.3 % (ref 0–1.5)
BUN SERPL-MCNC: 45 MG/DL (ref 8–23)
BUN/CREAT SERPL: 30.8 (ref 7–25)
CALCIUM SPEC-SCNC: 9.3 MG/DL (ref 8.6–10.5)
CHLORIDE SERPL-SCNC: 103 MMOL/L (ref 98–107)
CO2 SERPL-SCNC: 29 MMOL/L (ref 22–29)
CREAT SERPL-MCNC: 1.46 MG/DL (ref 0.76–1.27)
DEPRECATED RDW RBC AUTO: 42.5 FL (ref 37–54)
EOSINOPHIL # BLD AUTO: 0.24 10*3/MM3 (ref 0–0.4)
EOSINOPHIL NFR BLD AUTO: 1.9 % (ref 0.3–6.2)
ERYTHROCYTE [DISTWIDTH] IN BLOOD BY AUTOMATED COUNT: 12.9 % (ref 12.3–15.4)
GFR SERPL CREATININE-BSD FRML MDRD: 45 ML/MIN/1.73
GLUCOSE SERPL-MCNC: 101 MG/DL (ref 65–99)
HCT VFR BLD AUTO: 40.1 % (ref 37.5–51)
HGB BLD-MCNC: 12.4 G/DL (ref 13–17.7)
IMM GRANULOCYTES # BLD AUTO: 0.09 10*3/MM3 (ref 0–0.05)
IMM GRANULOCYTES NFR BLD AUTO: 0.7 % (ref 0–0.5)
LYMPHOCYTES # BLD AUTO: 0.96 10*3/MM3 (ref 0.7–3.1)
LYMPHOCYTES NFR BLD AUTO: 7.7 % (ref 19.6–45.3)
MAGNESIUM SERPL-MCNC: 2.3 MG/DL (ref 1.6–2.4)
MCH RBC QN AUTO: 27.9 PG (ref 26.6–33)
MCHC RBC AUTO-ENTMCNC: 30.9 G/DL (ref 31.5–35.7)
MCV RBC AUTO: 90.3 FL (ref 79–97)
MONOCYTES # BLD AUTO: 1.24 10*3/MM3 (ref 0.1–0.9)
MONOCYTES NFR BLD AUTO: 9.9 % (ref 5–12)
NEUTROPHILS NFR BLD AUTO: 79.5 % (ref 42.7–76)
NEUTROPHILS NFR BLD AUTO: 9.93 10*3/MM3 (ref 1.7–7)
NRBC BLD AUTO-RTO: 0 /100 WBC (ref 0–0.2)
PLATELET # BLD AUTO: 380 10*3/MM3 (ref 140–450)
PMV BLD AUTO: 10.1 FL (ref 6–12)
POTASSIUM SERPL-SCNC: 4.4 MMOL/L (ref 3.5–5.2)
RBC # BLD AUTO: 4.44 10*6/MM3 (ref 4.14–5.8)
SODIUM SERPL-SCNC: 141 MMOL/L (ref 136–145)
WBC NRBC COR # BLD: 12.5 10*3/MM3 (ref 3.4–10.8)

## 2022-02-12 PROCEDURE — 83735 ASSAY OF MAGNESIUM: CPT | Performed by: INTERNAL MEDICINE

## 2022-02-12 PROCEDURE — 85025 COMPLETE CBC W/AUTO DIFF WBC: CPT | Performed by: SPECIALIST

## 2022-02-12 PROCEDURE — 80048 BASIC METABOLIC PNL TOTAL CA: CPT | Performed by: SPECIALIST

## 2022-02-12 PROCEDURE — 0 POTASSIUM CHLORIDE PER 2 MEQ: Performed by: STUDENT IN AN ORGANIZED HEALTH CARE EDUCATION/TRAINING PROGRAM

## 2022-02-12 PROCEDURE — 25010000002 PIPERACILLIN SOD-TAZOBACTAM PER 1 G: Performed by: SPECIALIST

## 2022-02-12 PROCEDURE — 94799 UNLISTED PULMONARY SVC/PX: CPT

## 2022-02-12 PROCEDURE — 25010000002 ENOXAPARIN PER 10 MG: Performed by: SPECIALIST

## 2022-02-12 RX ORDER — SODIUM CHLORIDE AND POTASSIUM CHLORIDE 150; 450 MG/100ML; MG/100ML
50 INJECTION, SOLUTION INTRAVENOUS CONTINUOUS
Status: DISCONTINUED | OUTPATIENT
Start: 2022-02-12 | End: 2022-02-12

## 2022-02-12 RX ADMIN — POTASSIUM CHLORIDE AND SODIUM CHLORIDE 50 ML/HR: 450; 150 INJECTION, SOLUTION INTRAVENOUS at 11:52

## 2022-02-12 RX ADMIN — TAZOBACTAM SODIUM AND PIPERACILLIN SODIUM 2.25 G: 250; 2 INJECTION, SOLUTION INTRAVENOUS at 08:52

## 2022-02-12 RX ADMIN — TAZOBACTAM SODIUM AND PIPERACILLIN SODIUM 2.25 G: 250; 2 INJECTION, SOLUTION INTRAVENOUS at 17:23

## 2022-02-12 RX ADMIN — TAZOBACTAM SODIUM AND PIPERACILLIN SODIUM 2.25 G: 250; 2 INJECTION, SOLUTION INTRAVENOUS at 01:05

## 2022-02-12 RX ADMIN — ENOXAPARIN SODIUM 30 MG: 30 INJECTION SUBCUTANEOUS at 08:59

## 2022-02-12 NOTE — PLAN OF CARE
Goal Outcome Evaluation:  Plan of Care Reviewed With: patient  Progress: no change      Pt with minimal c/o pain thus far this shift, noted to have some bloating after eating ice chips. Offered to obtain pt something for gas or reflux, pt refused. IID between IV abx. Sánchez in place with output. KANG x1 with minimal output thus far this shift. O2 cont @ 6L with sats maintained in mid 90's. VSS. Safety maintained.

## 2022-02-12 NOTE — PLAN OF CARE
VSS with O2 on 5L high flow NC, up with assist X1, c/o abdominal pain and fullness this AM - currently has had 3 BMs and states the pressure has improved tremendously, currently NPO with sips/chips of clear liquids, denies pain, abx as ordered, safety maintained, A/O, SCDs, daughter at bedside, up in chair much of shift    Problem: Adult Inpatient Plan of Care  Goal: Plan of Care Review  Outcome: Ongoing, Progressing  Flowsheets  Taken 2/12/2022 1437 by Cathy Patel, RN  Progress: improving  Taken 2/12/2022 0645 by Oj Bergeron RN  Plan of Care Reviewed With: patient   Goal Outcome Evaluation:           Progress: improving

## 2022-02-12 NOTE — PROGRESS NOTES
[facility] Medicine Services  INPATIENT PROGRESS NOTE    Patient Name: Kj Cerrato  Date of Admission: 2/8/2022  Today's Date: 02/12/22  Length of Stay: 4  Primary Care Physician: Philippe Oliveira DO    Subjective   Chief Complaint: f/u resp failure    HPI:  Patient seen and examined with family member at bedside.  Per reports he had some abdominal distention increased this morning but has since had 3 bowel movements the patient states.  He says he feels much better than he did this morning.  No abdominal pain or pressure.  Not belching.  No chest pain.  He was on 6 L when he first arrived to the room and I dropped him to 5.  He maintained sats 97% throughout my stay.  Afebrile overnight.  No other acute issues.    ROS:  All pertinent negatives and positives are as above. All other systems have been reviewed and are negative unless otherwise stated.     Objective    Temp:  [97.7 °F (36.5 °C)-98.7 °F (37.1 °C)] 97.7 °F (36.5 °C)  Heart Rate:  [64-81] 66  Resp:  [16-20] 16  BP: (114-154)/(63-71) 154/70  Physical Exam  GEN: Awake, alert, interactive, in NAD  HEENT: PERRLA, EOMI, Anicteric, Trachea midline  Lungs:  CTAB without wheezes or rales.  Heart: RRR, +S1/s2, no rub   ABD:  + bowel sounds, abdominal bandage in place, drain with minimal serosanguineous fluid.  Extremities: atraumatic, no cyanosis, no pitting edema  Skin: no rashes or petechiae  Neuro: AAOx3, no focal deficits  Psych: normal mood & affect      Results Review:  I have reviewed the labs, radiology results, and diagnostic studies.    Laboratory Data:   Results from last 7 days   Lab Units 02/12/22  0712 02/11/22  0515 02/10/22  0351   WBC 10*3/mm3 12.50* 12.40* 8.48   HEMOGLOBIN g/dL 12.4* 13.2 12.1*   HEMATOCRIT % 40.1 43.5 37.6   PLATELETS 10*3/mm3 380 387 289        Results from last 7 days   Lab Units 02/12/22  0712 02/11/22  0515 02/10/22  0351 02/09/22  0830 02/08/22  1955   SODIUM mmol/L 141 142 142    < > 142   POTASSIUM mmol/L 4.4 4.6 4.8   < > 4.7   CHLORIDE mmol/L 103 101 101   < > 104   CO2 mmol/L 29.0 31.0* 29.0   < > 22.0   BUN mg/dL 45* 49* 50*   < > 33*   CREATININE mg/dL 1.46* 1.67* 2.38*   < > 2.19*   CALCIUM mg/dL 9.3 9.3 8.9   < > 9.8   BILIRUBIN mg/dL  --   --   --   --  1.6*   ALK PHOS U/L  --   --   --   --  39   ALT (SGPT) U/L  --   --   --   --  13   AST (SGOT) U/L  --   --   --   --  15   GLUCOSE mg/dL 101* 113* 99   < > 135*    < > = values in this interval not displayed.       Culture Data:   Blood Culture   Date Value Ref Range Status   02/08/2022 No growth at 24 hours  Preliminary   02/08/2022 No growth at 24 hours  Preliminary       Radiology Data:   Imaging Results (Last 24 Hours)     ** No results found for the last 24 hours. **          I have reviewed the patient's current medications.     Assessment/Plan     Active Hospital Problems    Diagnosis    • **Acute appendicitis    • Acute respiratory failure with hypoxia (HCC)    • Elevated serum creatinine    • Leukocytosis        Plan:  #1 acute appendicitis -post OR with drain in place.  Antibiotics and postop care per surgery.  Positive BM x3 this morning.  Good bowel sounds and I examined.    #2 acute respiratory failure hypoxia -suspect some flash pulmonary edema intraoperatively.  Did well with Lasix yesterday.  With good urine output nearly 2 L and 1-1/2 L negative for the day.  2D echo with preserved EF no overt mention of diastolic dysfunction.  He is now off CPAP and looks comfortable.  Repeat chest x-ray looks improved.  Gave him a dose of diuretic yesterday with good response.  Continue to wean O2.  We will hold diuretic today until we reevaluate p.o. intake and status.    #3 renal sufficiency - patient denies any renal dysfunction history but has previous history reported by a provider of renal insufficiency entered in the chart.  Prior creatinines available from 2018 were around 1.2 but no follow-up labs available to me from  then till now.  He was 2.2 preop and postop.  Renal function has since been improving postoperatively and after subsequent Lasix dosing.  Creatinine was 1.46 today.  Relatively stable.  Recheck again tomorrow.  Holding diuretics today.    #4 leukocytosis -improved persists, afebrile and does not appear septic.      Patient continues to look better.  Will monitor urine output and renal function. Wean FiO2 as able.  Medicine team will continue to follow along.    Electronically signed by Idris Sandhu DO, 02/12/22, 11:07 CST.

## 2022-02-12 NOTE — PROGRESS NOTES
Shanthi Vora MD - General Surgery  Progress Note     LOS: 4 days   Patient Care Team:  Philippe Oliveira DO as PCP - General (Family Medicine)      Subjective     Interval History:      No acute events. Patient denies flatus/BM. Does complain of bloating.      Objective     Vital Signs  Temp:  [97.7 °F (36.5 °C)-98.7 °F (37.1 °C)] 97.7 °F (36.5 °C)  Heart Rate:  [64-81] 66  Resp:  [16-20] 16  BP: (114-154)/(63-71) 154/70    Physical Exam:  General appearance - alert, well appearing, and in no distress  Mental status - alert, oriented to person, place, and time  Eyes - sclera anicteric  Neck - supple, no significant adenopathy  Heart - normal rate and regular rhythm  Abdomen - soft, distended, tympanic, appropriately TTP       Results Review:    Lab Results (last 24 hours)     Procedure Component Value Units Date/Time    Basic Metabolic Panel [[id number]]  (Abnormal) Collected: 02/12/22 0712    Specimen: Blood Updated: 02/12/22 0814     Glucose 101 mg/dL      BUN 45 mg/dL      Creatinine 1.46 mg/dL      Sodium 141 mmol/L      Potassium 4.4 mmol/L      Chloride 103 mmol/L      CO2 29.0 mmol/L      Calcium 9.3 mg/dL      eGFR Non African Amer 45 mL/min/1.73      BUN/Creatinine Ratio 30.8     Anion Gap 9.0 mmol/L     Narrative:      GFR Normal >60  Chronic Kidney Disease <60  Kidney Failure <15      Magnesium [[id number]]  (Normal) Collected: 02/12/22 0712    Specimen: Blood Updated: 02/12/22 0814     Magnesium 2.3 mg/dL     CBC & Differential [[id number]]  (Abnormal) Collected: 02/12/22 0712    Specimen: Blood Updated: 02/12/22 0748    Narrative:      The following orders were created for panel order CBC & Differential.  Procedure                               Abnormality         Status                     ---------                               -----------         ------                     CBC Auto Differential[[id number]]        Abnormal            Final result                 Please view results for these  tests on the individual orders.    CBC Auto Differential [[id number]]  (Abnormal) Collected: 02/12/22 0712    Specimen: Blood Updated: 02/12/22 0748     WBC 12.50 10*3/mm3      RBC 4.44 10*6/mm3      Hemoglobin 12.4 g/dL      Hematocrit 40.1 %      MCV 90.3 fL      MCH 27.9 pg      MCHC 30.9 g/dL      RDW 12.9 %      RDW-SD 42.5 fl      MPV 10.1 fL      Platelets 380 10*3/mm3      Neutrophil % 79.5 %      Lymphocyte % 7.7 %      Monocyte % 9.9 %      Eosinophil % 1.9 %      Basophil % 0.3 %      Immature Grans % 0.7 %      Neutrophils, Absolute 9.93 10*3/mm3      Lymphocytes, Absolute 0.96 10*3/mm3      Monocytes, Absolute 1.24 10*3/mm3      Eosinophils, Absolute 0.24 10*3/mm3      Basophils, Absolute 0.04 10*3/mm3      Immature Grans, Absolute 0.09 10*3/mm3      nRBC 0.0 /100 WBC     Blood Culture - Blood, Arm, Right [[id number]]  (Normal) Collected: 02/08/22 2145    Specimen: Blood from Arm, Right Updated: 02/11/22 2215     Blood Culture No growth at 3 days    Blood Culture - Blood, Arm, Right [[id number]]  (Normal) Collected: 02/08/22 2155    Specimen: Blood from Arm, Right Updated: 02/11/22 2215     Blood Culture No growth at 3 days        Imaging Results (Last 24 Hours)     ** No results found for the last 24 hours. **            Assessment/Plan       POD 4 s/p laparoscopic appendectomy with drain placement for perforated appendicitis:     - continue IV antibiotics   - patient distended and tympanic this AM but denies nausea/vomiting. Will back down to NPO with sips of clears, popsicles and ice chips. If he complains of nausea, will get AXR. Would not be surprised if he develops an ileus with need for NGT placement. This would be expected after perforated appendicitis.   - Continue prn pain and nausea control   - Hospitalist following renal function and pulmonary function - appreciate their assistance.   - I did place him on maintenance IVF at 50ml/hour since I backed his diet down. Can stop if thought prudent per  hospitalist for his pulmonary status.       Shanthi Vora MD  02/12/22  09:53 CST

## 2022-02-13 LAB
ANION GAP SERPL CALCULATED.3IONS-SCNC: 8 MMOL/L (ref 5–15)
BACTERIA SPEC AEROBE CULT: NORMAL
BACTERIA SPEC AEROBE CULT: NORMAL
BASOPHILS # BLD MANUAL: 0.11 10*3/MM3 (ref 0–0.2)
BASOPHILS NFR BLD MANUAL: 1 % (ref 0–1.5)
BUN SERPL-MCNC: 33 MG/DL (ref 8–23)
BUN/CREAT SERPL: 26.2 (ref 7–25)
CALCIUM SPEC-SCNC: 9.2 MG/DL (ref 8.6–10.5)
CHLORIDE SERPL-SCNC: 104 MMOL/L (ref 98–107)
CO2 SERPL-SCNC: 32 MMOL/L (ref 22–29)
CREAT SERPL-MCNC: 1.26 MG/DL (ref 0.76–1.27)
DEPRECATED RDW RBC AUTO: 42.9 FL (ref 37–54)
EOSINOPHIL # BLD MANUAL: 0.34 10*3/MM3 (ref 0–0.4)
EOSINOPHIL NFR BLD MANUAL: 3 % (ref 0.3–6.2)
ERYTHROCYTE [DISTWIDTH] IN BLOOD BY AUTOMATED COUNT: 13 % (ref 12.3–15.4)
GFR SERPL CREATININE-BSD FRML MDRD: 54 ML/MIN/1.73
GLUCOSE SERPL-MCNC: 105 MG/DL (ref 65–99)
HCT VFR BLD AUTO: 41.2 % (ref 37.5–51)
HGB BLD-MCNC: 12.7 G/DL (ref 13–17.7)
LYMPHOCYTES # BLD MANUAL: 1.26 10*3/MM3 (ref 0.7–3.1)
LYMPHOCYTES NFR BLD MANUAL: 14 % (ref 5–12)
MCH RBC QN AUTO: 28 PG (ref 26.6–33)
MCHC RBC AUTO-ENTMCNC: 30.8 G/DL (ref 31.5–35.7)
MCV RBC AUTO: 90.7 FL (ref 79–97)
MONOCYTES # BLD: 1.6 10*3/MM3 (ref 0.1–0.9)
NEUTROPHILS # BLD AUTO: 8.1 10*3/MM3 (ref 1.7–7)
NEUTROPHILS NFR BLD MANUAL: 71 % (ref 42.7–76)
PLAT MORPH BLD: NORMAL
PLATELET # BLD AUTO: 385 10*3/MM3 (ref 140–450)
PMV BLD AUTO: 9.8 FL (ref 6–12)
POTASSIUM SERPL-SCNC: 4 MMOL/L (ref 3.5–5.2)
RBC # BLD AUTO: 4.54 10*6/MM3 (ref 4.14–5.8)
RBC MORPH BLD: NORMAL
SODIUM SERPL-SCNC: 144 MMOL/L (ref 136–145)
VARIANT LYMPHS NFR BLD MANUAL: 1 % (ref 0–5)
VARIANT LYMPHS NFR BLD MANUAL: 10 % (ref 19.6–45.3)
WBC MORPH BLD: NORMAL
WBC NRBC COR # BLD: 11.41 10*3/MM3 (ref 3.4–10.8)

## 2022-02-13 PROCEDURE — 85025 COMPLETE CBC W/AUTO DIFF WBC: CPT | Performed by: SPECIALIST

## 2022-02-13 PROCEDURE — 97161 PT EVAL LOW COMPLEX 20 MIN: CPT | Performed by: PHYSICAL THERAPIST

## 2022-02-13 PROCEDURE — 25010000002 ENOXAPARIN PER 10 MG: Performed by: SPECIALIST

## 2022-02-13 PROCEDURE — 25010000002 MORPHINE SULFATE (PF) 2 MG/ML SOLUTION: Performed by: SPECIALIST

## 2022-02-13 PROCEDURE — 80048 BASIC METABOLIC PNL TOTAL CA: CPT | Performed by: SPECIALIST

## 2022-02-13 PROCEDURE — 25010000002 PIPERACILLIN SOD-TAZOBACTAM PER 1 G: Performed by: SPECIALIST

## 2022-02-13 PROCEDURE — 85007 BL SMEAR W/DIFF WBC COUNT: CPT | Performed by: SPECIALIST

## 2022-02-13 RX ORDER — OXYCODONE HYDROCHLORIDE 5 MG/1
5 TABLET ORAL EVERY 6 HOURS PRN
Status: DISCONTINUED | OUTPATIENT
Start: 2022-02-13 | End: 2022-02-16 | Stop reason: HOSPADM

## 2022-02-13 RX ORDER — ATENOLOL 50 MG/1
50 TABLET ORAL
Status: DISCONTINUED | OUTPATIENT
Start: 2022-02-13 | End: 2022-02-16 | Stop reason: HOSPADM

## 2022-02-13 RX ORDER — ACETAMINOPHEN 500 MG
1000 TABLET ORAL EVERY 8 HOURS PRN
Status: DISCONTINUED | OUTPATIENT
Start: 2022-02-13 | End: 2022-02-16 | Stop reason: HOSPADM

## 2022-02-13 RX ORDER — GABAPENTIN 300 MG/1
300 CAPSULE ORAL NIGHTLY
Status: DISCONTINUED | OUTPATIENT
Start: 2022-02-13 | End: 2022-02-16 | Stop reason: HOSPADM

## 2022-02-13 RX ORDER — TRAMADOL HYDROCHLORIDE 50 MG/1
25 TABLET ORAL EVERY 6 HOURS PRN
Status: DISCONTINUED | OUTPATIENT
Start: 2022-02-13 | End: 2022-02-16 | Stop reason: HOSPADM

## 2022-02-13 RX ORDER — FINASTERIDE 5 MG/1
5 TABLET, FILM COATED ORAL DAILY
Status: DISCONTINUED | OUTPATIENT
Start: 2022-02-13 | End: 2022-02-16 | Stop reason: HOSPADM

## 2022-02-13 RX ADMIN — FINASTERIDE 5 MG: 5 TABLET, FILM COATED ORAL at 11:15

## 2022-02-13 RX ADMIN — TAZOBACTAM SODIUM AND PIPERACILLIN SODIUM 2.25 G: 250; 2 INJECTION, SOLUTION INTRAVENOUS at 08:46

## 2022-02-13 RX ADMIN — ATENOLOL 50 MG: 50 TABLET ORAL at 11:15

## 2022-02-13 RX ADMIN — TAZOBACTAM SODIUM AND PIPERACILLIN SODIUM 2.25 G: 250; 2 INJECTION, SOLUTION INTRAVENOUS at 15:27

## 2022-02-13 RX ADMIN — GABAPENTIN 300 MG: 300 CAPSULE ORAL at 20:20

## 2022-02-13 RX ADMIN — ENOXAPARIN SODIUM 30 MG: 30 INJECTION SUBCUTANEOUS at 08:52

## 2022-02-13 RX ADMIN — TAZOBACTAM SODIUM AND PIPERACILLIN SODIUM 2.25 G: 250; 2 INJECTION, SOLUTION INTRAVENOUS at 01:17

## 2022-02-13 RX ADMIN — MORPHINE SULFATE 2 MG: 2 INJECTION, SOLUTION INTRAMUSCULAR; INTRAVENOUS at 13:22

## 2022-02-13 NOTE — THERAPY EVALUATION
Patient Name: Kj Cerrato  : 1932    MRN: [id number]                              Today's Date: 2022       Admit Date: 2022    Visit Dx:     ICD-10-CM ICD-9-CM   1. Acute appendicitis, unspecified acute appendicitis type  K35.80 540.9   2. Appendicitis  K37 541   3. Dysphagia, unspecified type  R13.10 787.20   4. Impaired mobility  Z74.09 799.89     Patient Active Problem List   Diagnosis   • Renal artery stenosis (HCC)   • Aortoiliac occlusive disease (HCC)   • Bilateral carotid artery stenosis   • Acute appendicitis   • Acute respiratory failure with hypoxia (HCC)   • Elevated serum creatinine   • Leukocytosis     Past Medical History:   Diagnosis Date   • Atherosclerosis    • BPH (benign prostatic hyperplasia)    • Carotid artery disease (HCC)    • High cholesterol    • Hypertension    • Kidney cysts    • Kidney stone    • Renal artery stenosis (HCC)    • Renal artery stenosis (HCC) 2018   • Renal insufficiency    • Solitary lung nodule    • Stroke (HCC)     Residual speech problems.     Past Surgical History:   Procedure Laterality Date   • APPENDECTOMY N/A 2022    Procedure: APPENDECTOMY LAPAROSCOPIC;  Surgeon: Milly Valverde MD;  Location: [facility];  Service: General;  Laterality: N/A;   • CAROTID ENDARTERECTOMY Right     Per Dr Idris Dotson   • PROSTATE SURGERY        General Information     Row Name 22 1108          Physical Therapy Time and Intention    Document Type evaluation  presents with right lower quadrant abdominal pain for about 24 hours associated with some nausea and poor appetite; s/p laparascopic appendectomy  -SB     Mode of Treatment physical therapy  -SB     Row Name 22 1104          General Information    Patient Profile Reviewed yes  -SB     Prior Level of Function independent:; all household mobility; ADL's; cooking; cleaning; driving  has cane, walker, shower chair, grab bars but doesn't use them; walk in shower  -SB     Existing  Precautions/Restrictions fall; oxygen therapy device and L/min  3L O2  -SB     Barriers to Rehab none identified  -SB     Row Name 02/13/22 1108          Living Environment    Lives With alone  -SB     Row Name 02/13/22 1108          Home Main Entrance    Number of Stairs, Main Entrance five  also has ramp  -SB     Stair Railings, Main Entrance railing on left side (ascending)  -SB     Row Name 02/13/22 1108          Stairs Within Home, Primary    Stairs, Within Home, Primary has basement but doesn't have to use  -SB     Row Name 02/13/22 1108          Cognition    Orientation Status (Cognition) oriented x 4  -SB     Row Name 02/13/22 1108          Safety Issues, Functional Mobility    Impairments Affecting Function (Mobility) balance; endurance/activity tolerance  -SB           User Key  (r) = Recorded By, (t) = Taken By, (c) = Cosigned By    Initials Name Provider Type    SB Ernestina Chinchilla PT DPT Physical Therapist               Mobility     Row Name 02/13/22 1108          Bed Mobility    Bed Mobility supine-sit; sit-supine  -SB     Supine-Sit Nye (Bed Mobility) supervision  -SB     Sit-Supine Nye (Bed Mobility) supervision  -SB     Assistive Device (Bed Mobility) head of bed elevated; bed rails  -SB     Row Name 02/13/22 1108          Sit-Stand Transfer    Sit-Stand Nye (Transfers) standby assist  -SB     Row Name 02/13/22 1108          Gait/Stairs (Locomotion)    [place] Level (Gait) contact guard  -SB     Assistive Device (Gait) walker, front-wheeled  -SB     Distance in Feet (Gait) 150  -SB     Deviations/Abnormal Patterns (Gait) base of support, wide; gait speed decreased  -SB     Bilateral Gait Deviations forward flexed posture  -SB     Comment (Gait/Stairs) unsteady at first, reaches for objects; provided RW and balance improved signifcantly; encouraged to use RW upon return home  -SB           User Key  (r) = Recorded By, (t) = Taken By, (c) = Cosigned By    Initials Name  Provider Type    SB Ernestina Chinchilla, PT DPT Physical Therapist               Obj/Interventions     Row Name 02/13/22 1108          Range of Motion Comprehensive    General Range of Motion bilateral lower extremity ROM WFL  -SB     Row Name 02/13/22 1108          Strength Comprehensive (MMT)    General Manual Muscle Testing (MMT) Assessment lower extremity strength deficits identified  -SB     Comment, General Manual Muscle Testing (MMT) Assessment BLE 4/5  -SB     Row Name 02/13/22 1108          Balance    Balance Assessment sitting static balance; sitting dynamic balance; standing static balance; standing dynamic balance  -SB     Static Sitting Balance WFL; unsupported; sitting, edge of bed  -SB     Dynamic Sitting Balance WFL; sitting, edge of bed; unsupported  -SB     Static Standing Balance WFL; unsupported; standing  -SB     Dynamic Standing Balance mild impairment; supported; standing  -SB     Row Name 02/13/22 1108          Sensory Assessment (Somatosensory)    Sensory Assessment (Somatosensory) LE sensation intact  -SB           User Key  (r) = Recorded By, (t) = Taken By, (c) = Cosigned By    Initials Name Provider Type    SB Ernestina Chinchilla PT DPT Physical Therapist               Goals/Plan     Row Name 02/13/22 1108          Bed Mobility Goal 1 (PT)    Activity/Assistive Device (Bed Mobility Goal 1, PT) sit to supine; supine to sit  -SB     [place] Level/Cues Needed (Bed Mobility Goal 1, PT) independent  -SB     Time Frame (Bed Mobility Goal 1, PT) long term goal (LTG)  -SB     Progress/Outcomes (Bed Mobility Goal 1, PT) goal ongoing  -SB     Row Name 02/13/22 1108          Transfer Goal 1 (PT)    Activity/Assistive Device (Transfer Goal 1, PT) sit-to-stand/stand-to-sit; bed-to-chair/chair-to-bed  -SB     [place] Level/Cues Needed (Transfer Goal 1, PT) supervision required  -SB     Time Frame (Transfer Goal 1, PT) long term goal (LTG)  -SB     Progress/Outcome (Transfer Goal 1, PT) goal ongoing   "-SB     Row Name 02/13/22 1108          Gait Training Goal 1 (PT)    Activity/Assistive Device (Gait Training Goal 1, PT) gait (walking locomotion); decrease fall risk; improve balance and speed; increase endurance/gait distance; walker, rolling  -SB     [place] Level (Gait Training Goal 1, PT) standby assist  -SB     Distance (Gait Training Goal 1, PT) 250  -SB     Time Frame (Gait Training Goal 1, PT) long term goal (LTG)  -SB     Progress/Outcome (Gait Training Goal 1, PT) goal ongoing  -SB           User Key  (r) = Recorded By, (t) = Taken By, (c) = Cosigned By    Initials Name Provider Type    SB Ernestina Chinchilla, PT DPT Physical Therapist               Clinical Impression     Row Name 02/13/22 1108          Pain    Additional Documentation Pain Scale: Numbers Pre/Post-Treatment (Group)  -SB     Row Name 02/13/22 1108          Pain Scale: Numbers Pre/Post-Treatment    Pretreatment Pain Rating 0/10 - no pain  -SB     Pre/Posttreatment Pain Comment c/o \"pressure\" in his rectal area when sitting EOB  -SB     Pain Intervention(s) Medication (See MAR); Repositioned; Ambulation/increased activity  -SB     Row Name 02/13/22 1108          Plan of Care Review    Plan of Care Reviewed With patient  -SB     Progress no change  -SB     Outcome Summary PT eval completed. Pt alert and oriented x4 with no complaints upon arrival. Pt on 3L O2 with sats in 90s. Pt performs bed mob with supervision and reports discomfort/pressure in rectal area when sitting EOB. Pt performs sit to stand t/f and gait training with SBA-CGA. Pt unsteady at first, reaches for objects for stability; provided RW and balance improved signifcantly remainder of gait training. Pt encouraged to use RW upon return home to improve safety and balance. Pt O2 sats at 96% upon return to room. Pt will benefit from skilled PT to improve gait, balance and endurance. Recommend d/c home with assist from dtr.  -SB     Row Name 02/13/22 1108          Therapy " Assessment/Plan (PT)    Patient/Family Therapy Goals Statement (PT) go home  -SB     Rehab Potential (PT) good, to achieve stated therapy goals  -SB     Criteria for Skilled Interventions Met (PT) yes; meets criteria; skilled treatment is necessary  -SB     Predicted Duration of Therapy Intervention (PT) until d/c or goals achieved  -SB     Row Name 02/13/22 1108          Vital Signs    Pre SpO2 (%) 95  -SB     O2 Delivery Pre Treatment nasal cannula  3L  -SB     O2 Delivery Intra Treatment nasal cannula  -SB     Post SpO2 (%) 96  -SB     O2 Delivery Post Treatment nasal cannula  -SB     Row Name 02/13/22 1108          Positioning and Restraints    Pre-Treatment Position in bed  -SB     Post Treatment Position bed  -SB     In Bed fowlers; call light within reach; encouraged to call for assist; side rails up x2; with family/caregiver; SCD pump applied  -SB           User Key  (r) = Recorded By, (t) = Taken By, (c) = Cosigned By    Initials Name Provider Type    Ernestina Yoder PT DPT Physical Therapist               Outcome Measures     Row Name 02/13/22 1108          How much help from another person do you currently need...    Turning from your back to your side while in flat bed without using bedrails? 4  -SB     Moving from lying on back to sitting on the side of a flat bed without bedrails? 3  -SB     Moving to and from a bed to a chair (including a wheelchair)? 3  -SB     Standing up from a chair using your arms (e.g., wheelchair, bedside chair)? 3  -SB     Climbing 3-5 steps with a railing? 3  -SB     To walk in hospital room? 3  -SB     AM-PAC 6 Clicks Score (PT) 19  -SB     Row Name 02/13/22 1108          Functional Assessment    Outcome Measure Options AM-PAC 6 Clicks Basic Mobility (PT)  -SB           User Key  (r) = Recorded By, (t) = Taken By, (c) = Cosigned By    Initials Name Provider Type    Ernestina Yoder PT DPT Physical Therapist                             Physical Therapy Education                  Title: PT OT SLP Therapies (In Progress)     Topic: Physical Therapy (In Progress)     Point: Mobility training (Done)     Learning Progress Summary           Patient Acceptance, E, VU by SB at 2/13/2022 1128    Comment: pt edu on POC, benefits of act, d/c plans                   Point: Home exercise program (Not Started)     Learner Progress:  Not documented in this visit.          Point: Body mechanics (Done)     Learning Progress Summary           Patient Acceptance, E, VU by SB at 2/13/2022 1128    Comment: pt edu on POC, benefits of act, d/c plans                   Point: Precautions (Done)     Learning Progress Summary           Patient Acceptance, E, VU by SB at 2/13/2022 1128    Comment: pt edu on POC, benefits of act, d/c plans                               User Key     Initials Effective Dates Name Provider Type Discipline    SB 06/16/21 -  Ernestina Chinchilla, PT DPT Physical Therapist PT              PT Recommendation and Plan  Planned Therapy Interventions (PT): balance training, bed mobility training, gait training, patient/family education, transfer training, strengthening, stair training  Plan of Care Reviewed With: patient  Progress: no change  Outcome Summary: PT eval completed. Pt alert and oriented x4 with no complaints upon arrival. Pt on 3L O2 with sats in 90s. Pt performs bed mob with supervision and reports discomfort/pressure in rectal area when sitting EOB. Pt performs sit to stand t/f and gait training with SBA-CGA. Pt unsteady at first, reaches for objects for stability; provided RW and balance improved signifcantly remainder of gait training. Pt encouraged to use RW upon return home to improve safety and balance. Pt O2 sats at 96% upon return to room. Pt will benefit from skilled PT to improve gait, balance and endurance. Recommend d/c home with assist from dtr.     Time Calculation:    PT Charges     Row Name 02/13/22 7383             Time Calculation    Start Time 1108  -       Stop Time 1151  -SB      Time Calculation (min) 43 min  -SB      PT Received On 02/13/22  -SB      PT Goal Re-Cert Due Date 02/23/22  -SB            User Key  (r) = Recorded By, (t) = Taken By, (c) = Cosigned By    Initials Name Provider Type    Ernestina Yoder, PT DPT Physical Therapist              Therapy Charges for Today     Code Description Service Date Service Provider Modifiers Qty    95754434140 HC PT EVAL LOW COMPLEXITY 3 2/13/2022 Ernestina Chinchilla, PT DPT GP 1          PT G-Codes  Outcome Measure Options: AM-PAC 6 Clicks Basic Mobility (PT)  AM-PAC 6 Clicks Score (PT): 19    Ernestina Chinchilla PT DPT  2/13/2022

## 2022-02-13 NOTE — PROGRESS NOTES
Shanthi Vora MD - General Surgery  Progress Note     LOS: 5 days   Patient Care Team:  Philippe Oliveira DO as PCP - General (Family Medicine)      Subjective     Interval History:      No acute events. Had 4 BMs. Passing flatus. Tolerating fulls. Denies nausea/vomiting.     Objective     Vital Signs  Temp:  [97.7 °F (36.5 °C)-98.8 °F (37.1 °C)] 98.4 °F (36.9 °C)  Heart Rate:  [63-91] 68  Resp:  [16] 16  BP: (118-143)/(54-73) 143/67    Physical Exam:  General appearance - alert, well appearing, and in no distress  Mental status - alert, oriented to person, place, and time  Eyes - sclera anicteric  Neck - supple, no significant adenopathy  Heart - normal rate and regular rhythm  Abdomen - soft, non-distende, appropriately TTP, KANG with serous output       Results Review:    Lab Results (last 24 hours)     Procedure Component Value Units Date/Time    Basic Metabolic Panel [[id number]]  (Abnormal) Collected: 02/13/22 0731    Specimen: Blood Updated: 02/13/22 0824     Glucose 105 mg/dL      BUN 33 mg/dL      Creatinine 1.26 mg/dL      Sodium 144 mmol/L      Potassium 4.0 mmol/L      Chloride 104 mmol/L      CO2 32.0 mmol/L      Calcium 9.2 mg/dL      eGFR Non African Amer 54 mL/min/1.73      BUN/Creatinine Ratio 26.2     Anion Gap 8.0 mmol/L     Narrative:      GFR Normal >60  Chronic Kidney Disease <60  Kidney Failure <15      Manual Differential [[id number]]  (Abnormal) Collected: 02/13/22 0731    Specimen: Blood Updated: 02/13/22 0823     Neutrophil % 71.0 %      Lymphocyte % 10.0 %      Monocyte % 14.0 %      Eosinophil % 3.0 %      Basophil % 1.0 %      Atypical Lymphocyte % 1.0 %      Neutrophils Absolute 8.10 10*3/mm3      Lymphocytes Absolute 1.26 10*3/mm3      Monocytes Absolute 1.60 10*3/mm3      Eosinophils Absolute 0.34 10*3/mm3      Basophils Absolute 0.11 10*3/mm3      RBC Morphology Normal     WBC Morphology Normal     Platelet Morphology Normal    CBC & Differential [[id number]]  (Abnormal)  Collected: 02/13/22 0731    Specimen: Blood Updated: 02/13/22 0823    Narrative:      The following orders were created for panel order CBC & Differential.  Procedure                               Abnormality         Status                     ---------                               -----------         ------                     CBC Auto Differential[[id number]]        Abnormal            Final result                 Please view results for these tests on the individual orders.    CBC Auto Differential [[id number]]  (Abnormal) Collected: 02/13/22 0731    Specimen: Blood Updated: 02/13/22 0823     WBC 11.41 10*3/mm3      RBC 4.54 10*6/mm3      Hemoglobin 12.7 g/dL      Hematocrit 41.2 %      MCV 90.7 fL      MCH 28.0 pg      MCHC 30.8 g/dL      RDW 13.0 %      RDW-SD 42.9 fl      MPV 9.8 fL      Platelets 385 10*3/mm3     Blood Culture - Blood, Arm, Right [[id number]]  (Normal) Collected: 02/08/22 2145    Specimen: Blood from Arm, Right Updated: 02/12/22 2215     Blood Culture No growth at 4 days    Blood Culture - Blood, Arm, Right [[id number]]  (Normal) Collected: 02/08/22 2155    Specimen: Blood from Arm, Right Updated: 02/12/22 2215     Blood Culture No growth at 4 days        Imaging Results (Last 24 Hours)     ** No results found for the last 24 hours. **            Assessment/Plan       POD 5 s/p laparoscopic appendectomy with drain placement for perforated appendicitis:     - continue IV antibiotics   - Tolerating fulls with no nausea/vomiting/distention. Advance to GI soft diet   - Continue prn pain and nausea control   - Hospitalist following renal function and pulmonary function - appreciate their assistance.       Shanthi Vora MD  02/13/22  09:54 CST

## 2022-02-13 NOTE — PLAN OF CARE
Goal Outcome Evaluation:  Plan of Care Reviewed With: patient        Progress: no change  Outcome Summary: PT eval completed. Pt alert and oriented x4 with no complaints upon arrival. Pt on 3L O2 with sats in 90s. Pt performs bed mob with supervision and reports discomfort/pressure in rectal area when sitting EOB. Pt performs sit to stand t/f and gait training with SBA-CGA. Pt unsteady at first, reaches for objects for stability; provided RW and balance improved signifcantly remainder of gait training. Pt encouraged to use RW upon return home to improve safety and balance. Pt O2 sats at 96% upon return to room. Pt will benefit from skilled PT to improve gait, balance and endurance. Recommend d/c home with assist from dtr.

## 2022-02-13 NOTE — PROGRESS NOTES
1           [facility] Medicine Services  INPATIENT PROGRESS NOTE    Patient Name: Kj Cerrato  Date of Admission: 2/8/2022  Today's Date: 02/13/22  Length of Stay: 5  Primary Care Physician: Philippe Oliveira DO    Subjective   Chief Complaint: Follow-up  HPI   Hospitalist service consulted on February 9 for acute hypoxic respiratory failure.  It is suspected that it was secondary to flash pulmonary edema intraoperatively.  Patient did well on Lasix.  His echocardiogram showed:    Results for orders placed during the hospital encounter of 02/08/22    Adult Transthoracic Echo Complete W/ Cont if Necessary Per Protocol    Interpretation Summary  · The study is technically difficult for diagnosis  · Left ventricular ejection fraction appears to be 51 - 55%.  · Normal right ventricular cavity size noted. Mildly reduced right ventricular systolic function noted  · The left atrial cavity is mildly dilated.  · No hemodynamically significant valvular disease by Doppler exam.    He is now on 3.5 to 4 L nasal cannula with saturation 93 to 95%      Patient has hypoaeration in both chest x-ray but showed improvement in the haziness particularly in the right lung field on most recent x-ray dated February 10.  She has negative fluid balance.       Consulting provider was Dr. Valverde.  Patient is postoperative day 5 status post laparoscopic appendectomy with drain placement for perforated appendicitis.  Renal function is back to normal (creatinine has been as high as 2.38)      Patient seen and examined.  States that he is doing well.  He states that he tolerated his meal without any worsening abdominal pain nausea or vomiting.  He said he do the incentive spirometry about 15 times earlier  He said he sat on the chair 4 times since this morning.  He also mentioned he work with therapist today  Review of Systems     All pertinent negatives and positives are as above. All other systems have  been reviewed and are negative unless otherwise stated.     Objective    Temp:  [98.4 °F (36.9 °C)-98.8 °F (37.1 °C)] 98.4 °F (36.9 °C)  Heart Rate:  [68-91] 68  Resp:  [16] 16  BP: (118-143)/(54-67) 143/67  Physical Exam   Pleasant man  No distress  Now on 2 L nasal cannula with O2 saturation of 91%  He reach about 1250 leah on incentive spirometry  GEN: Awake, alert, interactive, in NAD  HEENT: PERRLA, EOMI, Anicteric, Trachea midline  Lungs:  CTAB without wheezes or rales.  Heart: RRR, +S1/s2, no rub   ABD:  + bowel sounds, abdominal bandage in place, drain with minimal serosanguineous fluid.  Extremities: atraumatic, no cyanosis, no pitting edema  Skin: no rashes or petechiae  Neuro: AAOx3, no focal deficits  Psych: normal mood & affect            Results Review:  I have reviewed the labs, radiology results, and diagnostic studies.    Laboratory Data:   Results from last 7 days   Lab Units 02/13/22  0731 02/12/22  0712 02/11/22  0515   WBC 10*3/mm3 11.41* 12.50* 12.40*   HEMOGLOBIN g/dL 12.7* 12.4* 13.2   HEMATOCRIT % 41.2 40.1 43.5   PLATELETS 10*3/mm3 385 380 387        Results from last 7 days   Lab Units 02/13/22  0731 02/12/22  0712 02/11/22  0515 02/09/22  0830 02/08/22  1955   SODIUM mmol/L 144 141 142   < > 142   POTASSIUM mmol/L 4.0 4.4 4.6   < > 4.7   CHLORIDE mmol/L 104 103 101   < > 104   CO2 mmol/L 32.0* 29.0 31.0*   < > 22.0   BUN mg/dL 33* 45* 49*   < > 33*   CREATININE mg/dL 1.26 1.46* 1.67*   < > 2.19*   CALCIUM mg/dL 9.2 9.3 9.3   < > 9.8   BILIRUBIN mg/dL  --   --   --   --  1.6*   ALK PHOS U/L  --   --   --   --  39   ALT (SGPT) U/L  --   --   --   --  13   AST (SGOT) U/L  --   --   --   --  15   GLUCOSE mg/dL 105* 101* 113*   < > 135*    < > = values in this interval not displayed.       Culture Data:   Blood Culture   Date Value Ref Range Status   02/08/2022 No growth at 4 days  Preliminary   02/08/2022 No growth at 4 days  Preliminary       Radiology Data:   Imaging Results (Last 24 Hours)      ** No results found for the last 24 hours. **          I have reviewed the patient's current medications.     Assessment/Plan     Active Hospital Problems    Diagnosis    • **Acute appendicitis    • Acute respiratory failure with hypoxia (HCC)    • Elevated serum creatinine    • Leukocytosis      · Status post lap abelardo for ruptured appendix.  Drain in place.  Postoperative follow-up per primary service.  On antibiotic  · Acute hypoxic respiratory failure -suspected by Dr. Sandhu is flash pulmonary edema intraoperatively.E F with preserved ejection fraction and no overt diastolic dysfunction.  Now on 2 L nasal cannula.  Encouraged to continue on incentive spirometry.  There may also be component of hypoventilated lungs.  · Acute renal insufficiency perioperatively -back to normal  · Leukocytosis in the setting of perforated appendix.  Currently on Zosyn per primary service  · History of hypertension-on atenolol; monitor blood pressure        Continue present management  Wean extension off as tolerated.  Encourage incentive spirometry  Increase activities as tolerated    atenolol, 50 mg, Oral, Q24H  enoxaparin, 30 mg, Subcutaneous, Daily  finasteride, 5 mg, Oral, Daily  gabapentin, 300 mg, Oral, Nightly  piperacillin-tazobactam, 2.25 g, Intravenous, Q8H            Discharge Planning: Per primary service  Electronically signed by Marco Cardenas MD, 02/13/22, 15:38 CST.

## 2022-02-13 NOTE — PLAN OF CARE
Goal Outcome Evaluation:  Plan of Care Reviewed With: patient  Progress: improving      Pt with no c/o pain thus far this shift. IID between IV abx infusing. Sánchez maintained in place with output. Tolerating fulls with no c/o n/v of increased bloating this shift. O2 in place @ 4L with sats maintained in mid 90's. KANG with minimal output noted. VSS. Safety maintained.

## 2022-02-14 ENCOUNTER — APPOINTMENT (OUTPATIENT)
Dept: CT IMAGING | Facility: HOSPITAL | Age: 87
End: 2022-02-14

## 2022-02-14 LAB
ANION GAP SERPL CALCULATED.3IONS-SCNC: 11 MMOL/L (ref 5–15)
BUN SERPL-MCNC: 27 MG/DL (ref 8–23)
BUN/CREAT SERPL: 20.9 (ref 7–25)
CALCIUM SPEC-SCNC: 8.5 MG/DL (ref 8.6–10.5)
CHLORIDE SERPL-SCNC: 103 MMOL/L (ref 98–107)
CO2 SERPL-SCNC: 28 MMOL/L (ref 22–29)
CREAT SERPL-MCNC: 1.29 MG/DL (ref 0.76–1.27)
DEPRECATED RDW RBC AUTO: 42.6 FL (ref 37–54)
EOSINOPHIL # BLD MANUAL: 0.56 10*3/MM3 (ref 0–0.4)
EOSINOPHIL NFR BLD MANUAL: 4 % (ref 0.3–6.2)
ERYTHROCYTE [DISTWIDTH] IN BLOOD BY AUTOMATED COUNT: 13 % (ref 12.3–15.4)
GFR SERPL CREATININE-BSD FRML MDRD: 52 ML/MIN/1.73
GLUCOSE SERPL-MCNC: 119 MG/DL (ref 65–99)
HCT VFR BLD AUTO: 43.4 % (ref 37.5–51)
HGB BLD-MCNC: 13.3 G/DL (ref 13–17.7)
LYMPHOCYTES # BLD MANUAL: 0.98 10*3/MM3 (ref 0.7–3.1)
LYMPHOCYTES NFR BLD MANUAL: 5 % (ref 5–12)
MCH RBC QN AUTO: 27.5 PG (ref 26.6–33)
MCHC RBC AUTO-ENTMCNC: 30.6 G/DL (ref 31.5–35.7)
MCV RBC AUTO: 89.7 FL (ref 79–97)
MONOCYTES # BLD: 0.7 10*3/MM3 (ref 0.1–0.9)
NEUTROPHILS # BLD AUTO: 11.8 10*3/MM3 (ref 1.7–7)
NEUTROPHILS NFR BLD MANUAL: 83 % (ref 42.7–76)
NEUTS BAND NFR BLD MANUAL: 1 % (ref 0–5)
PLAT MORPH BLD: NORMAL
PLATELET # BLD AUTO: 443 10*3/MM3 (ref 140–450)
PMV BLD AUTO: 9.8 FL (ref 6–12)
POTASSIUM SERPL-SCNC: 3.9 MMOL/L (ref 3.5–5.2)
RBC # BLD AUTO: 4.84 10*6/MM3 (ref 4.14–5.8)
RBC MORPH BLD: NORMAL
SODIUM SERPL-SCNC: 142 MMOL/L (ref 136–145)
VARIANT LYMPHS NFR BLD MANUAL: 2 % (ref 0–5)
VARIANT LYMPHS NFR BLD MANUAL: 5 % (ref 19.6–45.3)
WBC MORPH BLD: NORMAL
WBC NRBC COR # BLD: 14.05 10*3/MM3 (ref 3.4–10.8)

## 2022-02-14 PROCEDURE — 80048 BASIC METABOLIC PNL TOTAL CA: CPT | Performed by: SPECIALIST

## 2022-02-14 PROCEDURE — 25010000002 PIPERACILLIN SOD-TAZOBACTAM PER 1 G: Performed by: SPECIALIST

## 2022-02-14 PROCEDURE — 74177 CT ABD & PELVIS W/CONTRAST: CPT

## 2022-02-14 PROCEDURE — 85007 BL SMEAR W/DIFF WBC COUNT: CPT | Performed by: SPECIALIST

## 2022-02-14 PROCEDURE — 25010000002 ENOXAPARIN PER 10 MG: Performed by: SPECIALIST

## 2022-02-14 PROCEDURE — 97116 GAIT TRAINING THERAPY: CPT

## 2022-02-14 PROCEDURE — 85025 COMPLETE CBC W/AUTO DIFF WBC: CPT | Performed by: SPECIALIST

## 2022-02-14 PROCEDURE — 25010000002 IOPAMIDOL 61 % SOLUTION: Performed by: SPECIALIST

## 2022-02-14 RX ADMIN — ENOXAPARIN SODIUM 30 MG: 30 INJECTION SUBCUTANEOUS at 08:49

## 2022-02-14 RX ADMIN — TAZOBACTAM SODIUM AND PIPERACILLIN SODIUM 2.25 G: 250; 2 INJECTION, SOLUTION INTRAVENOUS at 15:55

## 2022-02-14 RX ADMIN — IOPAMIDOL 100 ML: 612 INJECTION, SOLUTION INTRAVENOUS at 11:18

## 2022-02-14 RX ADMIN — ATENOLOL 50 MG: 50 TABLET ORAL at 08:49

## 2022-02-14 RX ADMIN — GABAPENTIN 300 MG: 300 CAPSULE ORAL at 20:31

## 2022-02-14 RX ADMIN — OXYCODONE HYDROCHLORIDE 5 MG: 5 TABLET ORAL at 20:31

## 2022-02-14 RX ADMIN — FINASTERIDE 5 MG: 5 TABLET, FILM COATED ORAL at 08:49

## 2022-02-14 RX ADMIN — TAZOBACTAM SODIUM AND PIPERACILLIN SODIUM 2.25 G: 250; 2 INJECTION, SOLUTION INTRAVENOUS at 07:46

## 2022-02-14 RX ADMIN — TAZOBACTAM SODIUM AND PIPERACILLIN SODIUM 2.25 G: 250; 2 INJECTION, SOLUTION INTRAVENOUS at 00:03

## 2022-02-14 NOTE — PLAN OF CARE
Goal Outcome Evaluation:  Plan of Care Reviewed With: patient        Progress: improving  Outcome Summary: Pt performed bed mobility with supervision.  Transfered sit to stand with SBA.  Amb 400' with RWX and SBA.  Pt was on room air at rest with O2 at 85%, donned O2 at 1L, increased to 93% and remained in the 90's during tx.  Left pt in bed on 1L  O2.

## 2022-02-14 NOTE — PAYOR COMM NOTE
"REF:[id number]    [facility]  SHARON,  [phone]  OR  FAX   [phone]         Fabiola Reynolds (89 y.o. Male)             Date of Birth Social Security Number Address Home Phone MRN    09/17/1932  [address] [place] [address]  [id number]    Oriental orthodox Marital Status             Other        Admission Date Admission Type Admitting Provider Attending Provider Department, Room/Bed    2/8/22 Emergency Milly Valverde MD Tyrrell, Dana R, MD [facility] 3C, 378/1    Discharge Date Discharge Disposition Discharge Destination                         Attending Provider: Milly Valverde MD    Allergies: No Known Allergies    Isolation: None   Infection: None   Code Status: No CPR   Advance Care Planning Activity    Ht: 177.8 cm (70\")   Wt: 80.1 kg (176 lb 9.4 oz)    Admission Cmt: None   Principal Problem: Acute appendicitis [K35.80]                 Active Insurance as of 2/8/2022     Primary Coverage     Payor Plan Insurance Group Employer/Plan Group    AETNA MEDICARE REPLACEMENT AETNA MEDICARE REPLACEMENT [id number]     Payor Plan Address Payor Plan Phone Number Payor Plan Fax Number Effective Dates    [address] [phone]  1/1/2018 - None Entered    [facility] [id number]       Subscriber Name Subscriber Birth Date Member ID       FABIOLA REYNOLDS 9/17/1932 [id number]                 Emergency Contacts      (Rel.) Home Phone Work Phone Mobile Phone    rosario escobar (Daughter) -- [phone]        Oj Bergeron RN   Registered Nurse      Plan of Care   Signed   Date of Service:  02/13/22 0344   Creation Time:  02/13/22 0344              Signed            Goal Outcome Evaluation:  Plan of Care Reviewed With: patient  Progress: improving        Pt with no c/o pain thus far this shift. IID between IV abx infusing. Sánchez maintained in place with output. Tolerating fulls with no c/o n/v of increased bloating this shift. O2 in place @ 4L with " sats maintained in mid 90's. KANG with minimal output noted. VSS. Safety maintained.                  Ernestina Chinchilla, PIERRE DPT   Physical Therapist   Physical Therapy   Plan of Care   Signed   Date of Service:  02/13/22 1207   Creation Time:  02/13/22 1207              Signed          Goal Outcome Evaluation:  Plan of Care Reviewed With: patient  Progress: no change  Outcome Summary: PT eval completed. Pt alert and oriented x4 with no complaints upon arrival. Pt on 3L O2 with sats in 90s. Pt performs bed mob with supervision and reports discomfort/pressure in rectal area when sitting EOB. Pt performs sit to stand t/f and gait training with SBA-CGA. Pt unsteady at first, reaches for objects for stability; provided RW and balance improved signifcantly remainder of gait training. Pt encouraged to use RW upon return home to improve safety and balance. Pt O2 sats at 96% upon return to room. Pt will benefit from skilled PT to improve gait, balance and endurance. Recommend d/c home with assist from dtr.                    Cathy Patel RN   Registered Nurse      Plan of Care   Signed   Date of Service:  02/13/22 1830   Creation Time:  02/13/22 1830              Signed            VSS, patient has been up and ambulating in hallway with SBA and rolling walker, currently on room air - monitoring O2 sats, ning d/cd - currently DTV, IV abx as ordered, medicated X1 for pain, new order for pO pain medication as needed, home meds resumed, tolerating GI soft diet     Problem: Adult Inpatient Plan of Care  Goal: Plan of Care Review  Outcome: Ongoing, Progressing  Flowsheets  Taken 2/13/2022 1830 by Cathy Patel RN  Progress: improving  Taken 2/13/2022 1108 by Ernestina Chinchilla, PT DPT  Plan of Care Reviewed With: patient   Goal Outcome Evaluation:  Progress: improving                  Oj Bergeron RN   Registered Nurse      Plan of Care   Signed   Date of Service:  02/14/22 0432   Creation Time:  02/14/22 0432               Signed            Goal Outcome Evaluation:  Plan of Care Reviewed With: patient   Progress: improving        Pt with no c/o pain thus far this shift. IID between IV abx. Up with asst x1 and walker. Voiding per BRP. Pt placed on 2L while sleeping this shift for sats dipping into the 80's; sat since maintained in mid 90's. VSS. Safety maintained.                 Ashwin Dumas, TED   Physical Therapy Assistant   Specialty:  Physical Therapy   Plan of Care   Signed   Date of Service:  02/14/22 0934   Creation Time:  02/14/22 0950              Signed            Goal Outcome Evaluation:  Plan of Care Reviewed With: patient  Progress: improving  Outcome Summary: Pt performed bed mobility with supervision.  Transfered sit to stand with SBA.  Amb 400' with RWX and SBA.  Pt was on room air at rest with O2 at 85%, donned O2 at 1L, increased to 93% and remained in the 90's during tx.  Left pt in bed on 1L  O2.                      Vital Signs (last 3 days)     Date/Time Temp Temp src Pulse Resp BP Patient Position SpO2    02/14/22 0818 98.5 (36.9) Oral 84 16 142/77 Lying 92    02/14/22 0003 -- -- -- -- -- -- 94    02/14/22 0001 -- -- -- -- -- -- 88    02/13/22 2008 98.2 (36.8) Oral 66 16 127/62 Lying 92    02/13/22 1500 -- -- -- -- -- -- 95    02/13/22 0807 98.4 (36.9) Oral 68 16 143/67 Lying 93    02/13/22 0500 -- -- 74 -- -- -- 95    02/12/22 2325 -- -- 77 -- -- -- 96    02/12/22 2040 98.8 (37.1) Oral 81 16 118/54 Lying 98    02/12/22 1856 -- -- 91 -- -- -- 93    02/12/22 1824 -- -- -- -- -- -- 93    02/12/22 1528 98.5 (36.9) Oral 63 16 138/73 Lying 95    02/12/22 1400 -- -- -- -- -- -- 96    02/12/22 1103 97.7 (36.5) Oral 83 16 143/69 Sitting 97    02/12/22 0800 97.7 (36.5) Oral 66 16 154/70 Lying 95    02/12/22 0621 -- -- -- -- -- -- 92    02/12/22 0427 97.9 (36.6) Oral 67 16 128/66 Lying 95    02/12/22 0258 -- -- 64 16 -- -- 94    02/12/22 0103 97.8 (36.6) Oral 69 18 132/67 Lying 95    02/11/22 2341 -- -- 69 -- -- --  93    02/11/22 1943 98.4 (36.9) Oral 75 18 114/63 Lying 94    02/11/22 1812 -- -- 73 16 -- -- 93    02/11/22 1555 98.7 (37.1) Oral 81 18 151/71 Lying 91    02/11/22 1347 -- -- 79 20 -- -- 94    02/11/22 1300 -- -- -- -- -- -- 95    02/11/22 1225 98.5 (36.9) Oral 76 16 129/69 Lying 94    02/11/22 0945 -- -- 75 20 -- -- 95    02/11/22 0813 98.1 (36.7) Oral 78 18 136/65 Sitting 91    02/11/22 0713 -- -- 70 16 -- -- 94    02/11/22 0442 97.9 (36.6) Oral 62 18 118/75 Lying 94    02/11/22 0241 -- -- -- -- -- -- 91    02/11/22 0009 98.5 (36.9) Oral 70 18 127/71 Lying 94          Oxygen Therapy (last 3 days)     Date/Time SpO2 Device (Oxygen Therapy) Flow (L/min) Oxygen Concentration (%) ETCO2 (mmHg)    02/14/22 0818 92 room air -- -- --    02/14/22 0745 -- room air -- -- --    02/14/22 0003 94 nasal cannula 2 -- --    02/14/22 0001 88 room air -- -- --    02/13/22 2008 92 room air -- -- --    02/13/22 1500 95 nasal cannula 1.5 -- --    02/13/22 0807 93 humidified; nasal cannula 4 -- --    02/13/22 0800 -- humidified; high-flow nasal cannula 3.5 -- --    02/13/22 0500 95 humidified; nasal cannula 4 -- --    02/12/22 2325 96 humidified; nasal cannula 4 -- --    02/12/22 2040 98 high-flow nasal cannula 4.5 -- --    02/12/22 1856 93 high-flow mask 4.5 -- --    02/12/22 1824 93 high-flow nasal cannula; humidified 4.5 -- --    02/12/22 1815 -- humidified; high-flow nasal cannula 4.5 -- --    02/12/22 1528 95 humidified; high-flow nasal cannula 6 -- --    02/12/22 1400 96 -- 6 -- --    02/12/22 1103 97 humidified; high-flow nasal cannula 6 -- --    02/12/22 0824 -- humidified; high-flow nasal cannula 6 -- --    02/12/22 0800 95 humidified; high-flow nasal cannula 6 -- --    02/12/22 0621 92 high-flow nasal cannula; humidified 6 -- --    02/12/22 0427 95 high-flow nasal cannula 6 -- --    02/12/22 0258 94 high-flow nasal cannula; humidified 6 -- --    02/12/22 0103 95 high-flow nasal cannula 6 -- --    02/11/22 2341 93 high-flow  nasal cannula; humidified 6 -- --    02/11/22 1943 94 high-flow nasal cannula 6 -- --    02/11/22 1812 93 humidified; high-flow nasal cannula 6 -- --    02/11/22 1555 91 humidified; high-flow nasal cannula 6 -- --    02/11/22 1347 94 humidified; high-flow nasal cannula 6 -- --    02/11/22 1300 95 high-flow nasal cannula; humidified 8 -- --    02/11/22 1225 94 high-flow nasal cannula 10 -- --    02/11/22 0945 95 humidified; high-flow nasal cannula 10 -- --    02/11/22 0845 -- humidified; high-flow nasal cannula 10 -- --    02/11/22 0813 91 humidified; high-flow nasal cannula 10 -- --    02/11/22 0713 94 humidified; high-flow nasal cannula 10 -- --    02/11/22 0442 94 -- -- -- --    02/11/22 0241 91 humidified; high-flow nasal cannula 10 -- --    02/11/22 0009 94 -- -- -- --        Intake & Output (last 2 days)       02/12 0701 02/13 0700 02/13 0701 02/14 0700 02/14 0701  02/15 0700    P.O. 260 480 240    I.V. (mL/kg) 115.8 (1.4)      IV Piggyback 100 100     Total Intake(mL/kg) 475.8 (5.9) 580 (7.2) 240 (3)    Urine (mL/kg/hr) 750 (0.4) 875 (0.5)     Drains 25 40 40    Stool 0      Total Output 775 915 40    Net -299.2 -335 +200           Urine Unmeasured Occurrence  3 x     Stool Unmeasured Occurrence 4 x          Current Facility-Administered Medications   Medication Dose Route Frequency Provider Last Rate Last Admin   • acetaminophen (TYLENOL) tablet 1,000 mg  1,000 mg Oral Q8H PRN Shanthi Vora MD       • atenolol (TENORMIN) tablet 50 mg  50 mg Oral Q24H Shanthi Vora MD   50 mg at 02/14/22 0849   • enoxaparin (LOVENOX) syringe 30 mg  30 mg Subcutaneous Daily Milly Valverde MD   30 mg at 02/14/22 0849   • finasteride (PROSCAR) tablet 5 mg  5 mg Oral Daily Shanthi Vora MD   5 mg at 02/14/22 0849   • gabapentin (NEURONTIN) capsule 300 mg  300 mg Oral Nightly Shanthi Vora MD   300 mg at 02/13/22 2020   • morphine injection 4 mg  4 mg Intravenous Q3H PRN Milly Valverde MD       •  "Morphine sulfate (PF) injection 2 mg  2 mg Intravenous Q3H PRN Milly Valverde MD   2 mg at 02/13/22 1322   • ondansetron (ZOFRAN) injection 4 mg  4 mg Intravenous Q6H PRN Milly Valverde MD   4 mg at 02/09/22 1531   • oxyCODONE (ROXICODONE) immediate release tablet 5 mg  5 mg Oral Q6H PRN Shanthi Vora MD        And   • oxyCODONE (ROXICODONE) immediate release tablet 5 mg  5 mg Oral Q6H PRN Shanhti Vora MD       • piperacillin-tazobactam (ZOSYN) in iso-osmotic dextrose IVPB 2.25 g (premix)  2.25 g Intravenous Q8H Milly Valverde MD 0 mL/hr at 02/13/22 1630 2.25 g at 02/14/22 0746   • traMADol (ULTRAM) tablet 25 mg  25 mg Oral Q6H PRN Shanthi Vora MD         Orders (last 72 hrs)      Start     Ordered    02/14/22 1215  iopamidol (ISOVUE-300) 61 % injection 100 mL  Once in Imaging         02/14/22 1117    02/14/22 1018  CT Abdomen Pelvis With Contrast  1 Time Imaging         02/14/22 1018    02/14/22 1017  Manual Differential  Once         02/14/22 1016    02/14/22 0921  Manual Differential  Once,   Status:  Canceled         02/14/22 0920    02/14/22 0731  CBC Auto Differential  PROCEDURE ONCE         02/13/22 2202    02/14/22 0712  Code Status and Medical Interventions:  Continuous         02/14/22 0711    02/13/22 2100  gabapentin (NEURONTIN) capsule 300 mg  Nightly         02/13/22 0918    02/13/22 1649  oxyCODONE (ROXICODONE) immediate release tablet 5 mg  Every 6 Hours PRN        \"And\" Linked Group Details    02/13/22 1649    02/13/22 1648  oxyCODONE (ROXICODONE) immediate release tablet 5 mg  Every 6 Hours PRN        \"And\" Linked Group Details    02/13/22 1649    02/13/22 1648  traMADol (ULTRAM) tablet 25 mg  Every 6 Hours PRN         02/13/22 1649    02/13/22 1648  acetaminophen (TYLENOL) tablet 1,000 mg  Every 8 Hours PRN         02/13/22 1649    02/13/22 1500  Discontinue Indwelling Urinary Catheter  Once        Comments: Give proscar this AM then TRAE barclay this afternoon    02/13/22 " 0919    22 1209  PT Plan of Care Cert / Re-Cert  Once        Comments: Physical Therapy Plan of Care  Initial Certification  Certification Period: 2022 - 2022    Patient Name: Kj Cerrato  : 1932    (K35.80) Acute appendicitis, unspecified acute appendicitis type  (primary encounter diagnosis)  (K37) Appendicitis  (R13.10) Dysphagia, unspecified type  (Z74.09) Impaired mobility                  Assessment  PT Assessment  Rehab Potential (PT): good, to achieve stated therapy goals  Criteria for Skilled Interventions Met (PT): yes, meets criteria, skilled treatment is necessary         PT Rehab Goals     Row Name 22 1108             Bed Mobility Goal 1 (PT)    Activity/Assistive Device (Bed Mobility Goal 1, PT) sit to supine; supine   to sit  -SB      [place] Level/Cues Needed (Bed Mobility Goal 1, PT) independent  -SB        Time Frame (Bed Mobility Goal 1, PT) long term goal (LTG)  -SB      Progress/Outcomes (Bed Mobility Goal 1, PT) goal ongoing  -SB              Transfer Goal 1 (PT)      Activity/Assistive Device (Transfer Goal 1, PT)   sit-to-stand/stand-to-sit; bed-to-chair/chair-to-bed  -SB      [place] Level/Cues Needed (Transfer Goal 1, PT) supervision required    -SB      Time Frame (Transfer Goal 1, PT) long term goal (LTG)  -SB      Progress/Outcome (Transfer Goal 1, PT) goal ongoing  -SB              Gait Training Goal 1 (PT)      Activity/Assistive Device (Gait Training Goal 1, PT) gait (walking   locomotion); decrease fall risk; improve balance and speed; increase   endurance/gait distance; walker, rolling  -SB      [place] Level (Gait Training Goal 1, PT) standby assist  -SB      Distance (Gait Training Goal 1, PT) 250  -SB      Time Frame (Gait Training Goal 1, PT) long term goal (LTG)  -SB      Progress/Outcome (Gait Training Goal 1, PT) goal ongoing  -SB            User Key  (r) = Recorded By, (t) = Taken By, (c) = Cosigned By    Initials Name Provider  Type Discipline    Ernestina Yoder PT DPT Physical Therapist PT             PT OP Goals     Row Name 02/13/22 1207          Time Calculation    PT Goal Re-Cert Due Date 02/23/22  -YESENIA           User Key  (r) = Recorded By, (t) = Taken By, (c) = Cosigned By    Initials Name Provider Type    Ernestina Yoder PT DPT Physical Therapist                  Plan  PT Plan  Therapy Frequency (PT): 2 times/day  Predicted Duration of Therapy Intervention (PT): until d/c or goals achieved  Planned Therapy Interventions (PT): balance training, bed mobility training, gait training, patient/family education, transfer training, strengthening, stair training  Outcome Summary: PT eval completed. Pt alert and oriented x4 with no complaints upon arrival. Pt on 3L O2 with sats in 90s. Pt performs bed mob with supervision and reports discomfort/pressure in rectal area when sitting EOB. Pt performs sit to stand t/f and gait training with SBA-CGA. Pt unsteady at first, reaches for objects for stability; provided RW and balance improved signifcantly remainder of gait training. Pt encouraged to use RW upon return home to improve safety and balance. Pt O2 sats at 96% upon return to room. Pt will benefit from skilled PT to improve gait, balance and endurance. Recommend d/c home with assist from dtruby Chinchilla PT DPT  2/13/2022            By cosigning this order, either electronically or physically, I certify that the therapy services are furnished while this patient is under my care, the services outline above are required by this patient, and will be reviewed every 90 days.        M.D.:__________________________________________ Date: ______________    02/13/22 1208    02/13/22 1015  atenolol (TENORMIN) tablet 50 mg  Every 24 Hours Scheduled         02/13/22 0918    02/13/22 1015  finasteride (PROSCAR) tablet 5 mg  Daily         02/13/22 0918    02/13/22 0955  Diet Regular; GI Soft  Diet Effective Now         02/13/22 0972     02/13/22 0919  Notify Provider if Bladder Distention Continues  Until Discontinued         02/13/22 0919    02/13/22 0919  Consult Pharmacist For Review of Medications That May Cause Urinary Retention - RN To Place Order for Consult it Needed  Continuous         02/13/22 0919    02/13/22 0800  Manual Differential  Once         02/13/22 0759    02/13/22 0600  CBC Auto Differential  PROCEDURE ONCE         02/12/22 2202 02/12/22 2010  NIPPV (CPAP or BIPAP)  As Needed-RT        Comments: RT to manage   Changed to PRN per protocol    02/12/22 2010 02/12/22 1647  Diet Full Liquid  Diet Effective Now,   Status:  Canceled         02/12/22 1646    02/12/22 1452  PT Consult: Eval & Treat Post Surgery Care, Functional Mobility Below Baseline  Once         02/12/22 1451    02/12/22 1045  sodium chloride 0.45 % with KCl 20 mEq/L infusion  Continuous,   Status:  Discontinued         02/12/22 0953    02/12/22 0954  NPO Diet NPO Except: Ice Chips, Sips With Meds, Other; Other: popsicles  Diet Effective Now,   Status:  Canceled         02/12/22 0953    02/12/22 0600  Magnesium  Morning Draw         02/11/22 1648    02/12/22 0600  CBC Auto Differential  PROCEDURE ONCE         02/11/22 2202 02/11/22 1400  Incentive Spirometry  Every 4 Hours While Awake       02/11/22 1111    02/11/22 1323  Transfer Patient  Once         02/11/22 1322    02/11/22 1200  furosemide (LASIX) injection 20 mg  Once         02/11/22 1112    02/10/22 0125  Urinary Catheter Care  Every Shift,   Status:  Canceled       02/10/22 0125    02/09/22 1006  enoxaparin (LOVENOX) syringe 30 mg  Daily         02/09/22 1004    02/09/22 0749  piperacillin-tazobactam (ZOSYN) in iso-osmotic dextrose IVPB 2.25 g (premix)  Every 8 Hours         02/09/22 0747    02/09/22 0600  Basic Metabolic Panel  Daily       02/08/22 2327    02/09/22 0600  CBC & Differential  Daily       02/08/22 2327    02/08/22 2326  Morphine sulfate (PF) injection 2 mg  Every 3 Hours PRN          02/08/22 2327 02/08/22 2326  morphine injection 4 mg  Every 3 Hours PRN         02/08/22 2327 02/08/22 2326  ondansetron (ZOFRAN) injection 4 mg  Every 6 Hours PRN         02/08/22 2327    Unscheduled  Bladder Scan if Patient Unable to Void 4-6 Hours After Catheter Removal  As Needed         02/13/22 0919    Unscheduled  If Bladder Scan Volume is Less Than 500mL & Patient is Without Symptoms of Bladder Discomfort / Distention Monitor Every 1-2 Hours for Spontaneous Void  As Needed       02/13/22 0919    Unscheduled  Straight Cath Every 4-6 Hours As Needed If Patient is Unable to Void After 4-6 Hours, Bladder Scan Volume is Greater Than 500mL & Patient Has Symptoms of Bladder Discomfort / Distention  As Needed       02/13/22 0919    Unscheduled  Schedule / Prompt Voiding For Patients With Urinary Incontinence  As Needed       02/13/22 0919    Signed and Held  sodium chloride 0.9 % flush 10 mL  Every 12 Hours Scheduled,   Status:  Canceled         Signed and Held    Signed and Held  sodium chloride 0.9 % flush 10 mL  As Needed,   Status:  Canceled         Signed and Held    Signed and Held  sodium chloride 0.9 % infusion  Continuous,   Status:  Canceled         Signed and Held    --  SCANNED EKG         02/08/22 0000    --  lisinopril (PRINIVIL,ZESTRIL) 10 MG tablet  Daily         02/10/22 1408    --  SCANNED - TELEMETRY           02/08/22 0000                   Physician Progress Notes (last 72 hours)      Milly Valverde MD at 02/14/22 1015          Milly Valverde MD Progress Note     LOS: 6 days   Patient Care Team:  Philippe Oliveira DO as PCP - General (Family Medicine)        Subjective     Doing well.  Bowel movements are becoming more solid.  Tolerating regular diet.    Objective     Vital Signs  Temp:  [98.2 °F (36.8 °C)-98.5 °F (36.9 °C)] 98.5 °F (36.9 °C)  Heart Rate:  [66-84] 84  Resp:  [16] 16  BP: (127-142)/(62-77) 142/77    Intake & Output (last 3 days)       02/11 0701  02/12 0700 02/12  0701  02/13 0700 02/13 0701  02/14 0700 02/14 0701  02/15 0700    P.O.  260 480     I.V. (mL/kg)  115.8 (1.4)      IV Piggyback  100 100     Total Intake(mL/kg)  475.8 (5.9) 580 (7.2)     Urine (mL/kg/hr) 1700 (0.9) 750 (0.4) 875 (0.5)     Drains 175 25 40 40    Stool  0      Total Output 1875 775 915 40    Net -1875 -299.2 -335 -40            Urine Unmeasured Occurrence   3 x     Stool Unmeasured Occurrence  4 x            Physical Exam:     General Appearance:    Alert, cooperative, in no acute distress   Lungs:     respirations regular, even and unlabored    Heart:    Regular rhythm and normal rate, normal S1 and S2, no            murmur, no gallop, no rub   Chest Wall:    No abnormalities observed   Abdomen:      Still little full especially on the right side.  KANG is serous   Extremities: No edema,    Results Review:     I reviewed the patient's new clinical results.    Lab Results (last 72 hours)     Procedure Component Value Units Date/Time    CBC Auto Differential [[id number]]  (Abnormal) Collected: 02/14/22 0905    Specimen: Blood Updated: 02/14/22 0933     WBC 14.05 10*3/mm3      RBC 4.84 10*6/mm3      Hemoglobin 13.3 g/dL      Hematocrit 43.4 %      MCV 89.7 fL      MCH 27.5 pg      MCHC 30.6 g/dL      RDW 13.0 %      RDW-SD 42.6 fl      MPV 9.8 fL      Platelets 443 10*3/mm3      nRBC 0.0 /100 WBC     Manual Differential [[id number]] Collected: 02/14/22 0905    Specimen: Blood Updated: 02/14/22 0920    CBC & Differential [[id number]]  (Abnormal) Collected: 02/14/22 0905    Specimen: Blood Updated: 02/14/22 0919    Narrative:      The following orders were created for panel order CBC & Differential.  Procedure                               Abnormality         Status                     ---------                               -----------         ------                     CBC Auto Differential[[id number]]        Abnormal            Preliminary result           Please view results for these tests on the  individual orders.    Basic Metabolic Panel [[id number]]  (Abnormal) Collected: 02/14/22 0708    Specimen: Blood Updated: 02/14/22 0751     Glucose 119 mg/dL      BUN 27 mg/dL      Creatinine 1.29 mg/dL      Sodium 142 mmol/L      Potassium 3.9 mmol/L      Chloride 103 mmol/L      CO2 28.0 mmol/L      Calcium 8.5 mg/dL      eGFR Non African Amer 52 mL/min/1.73      BUN/Creatinine Ratio 20.9     Anion Gap 11.0 mmol/L     Narrative:      GFR Normal >60  Chronic Kidney Disease <60  Kidney Failure <15      Blood Culture - Blood, Arm, Right [[id number]]  (Normal) Collected: 02/08/22 2145    Specimen: Blood from Arm, Right Updated: 02/13/22 2215     Blood Culture No growth at 5 days    Blood Culture - Blood, Arm, Right [[id number]]  (Normal) Collected: 02/08/22 2155    Specimen: Blood from Arm, Right Updated: 02/13/22 2215     Blood Culture No growth at 5 days    Basic Metabolic Panel [[id number]]  (Abnormal) Collected: 02/13/22 0731    Specimen: Blood Updated: 02/13/22 0824     Glucose 105 mg/dL      BUN 33 mg/dL      Creatinine 1.26 mg/dL      Sodium 144 mmol/L      Potassium 4.0 mmol/L      Chloride 104 mmol/L      CO2 32.0 mmol/L      Calcium 9.2 mg/dL      eGFR Non African Amer 54 mL/min/1.73      BUN/Creatinine Ratio 26.2     Anion Gap 8.0 mmol/L     Narrative:      GFR Normal >60  Chronic Kidney Disease <60  Kidney Failure <15      Manual Differential [[id number]]  (Abnormal) Collected: 02/13/22 0731    Specimen: Blood Updated: 02/13/22 0823     Neutrophil % 71.0 %      Lymphocyte % 10.0 %      Monocyte % 14.0 %      Eosinophil % 3.0 %      Basophil % 1.0 %      Atypical Lymphocyte % 1.0 %      Neutrophils Absolute 8.10 10*3/mm3      Lymphocytes Absolute 1.26 10*3/mm3      Monocytes Absolute 1.60 10*3/mm3      Eosinophils Absolute 0.34 10*3/mm3      Basophils Absolute 0.11 10*3/mm3      RBC Morphology Normal     WBC Morphology Normal     Platelet Morphology Normal    CBC & Differential [[id number]]  (Abnormal) Collected:  02/13/22 0731    Specimen: Blood Updated: 02/13/22 0823    Narrative:      The following orders were created for panel order CBC & Differential.  Procedure                               Abnormality         Status                     ---------                               -----------         ------                     CBC Auto Differential[[id number]]        Abnormal            Final result                 Please view results for these tests on the individual orders.    CBC Auto Differential [[id number]]  (Abnormal) Collected: 02/13/22 0731    Specimen: Blood Updated: 02/13/22 0823     WBC 11.41 10*3/mm3      RBC 4.54 10*6/mm3      Hemoglobin 12.7 g/dL      Hematocrit 41.2 %      MCV 90.7 fL      MCH 28.0 pg      MCHC 30.8 g/dL      RDW 13.0 %      RDW-SD 42.9 fl      MPV 9.8 fL      Platelets 385 10*3/mm3     Basic Metabolic Panel [[id number]]  (Abnormal) Collected: 02/12/22 0712    Specimen: Blood Updated: 02/12/22 0814     Glucose 101 mg/dL      BUN 45 mg/dL      Creatinine 1.46 mg/dL      Sodium 141 mmol/L      Potassium 4.4 mmol/L      Chloride 103 mmol/L      CO2 29.0 mmol/L      Calcium 9.3 mg/dL      eGFR Non African Amer 45 mL/min/1.73      BUN/Creatinine Ratio 30.8     Anion Gap 9.0 mmol/L     Narrative:      GFR Normal >60  Chronic Kidney Disease <60  Kidney Failure <15      Magnesium [[id number]]  (Normal) Collected: 02/12/22 0712    Specimen: Blood Updated: 02/12/22 0814     Magnesium 2.3 mg/dL     CBC & Differential [[id number]]  (Abnormal) Collected: 02/12/22 0712    Specimen: Blood Updated: 02/12/22 0748    Narrative:      The following orders were created for panel order CBC & Differential.  Procedure                               Abnormality         Status                     ---------                               -----------         ------                     CBC Auto Differential[[id number]]        Abnormal            Final result                 Please view results for these tests on the individual  orders.    CBC Auto Differential [[id number]]  (Abnormal) Collected: 02/12/22 0712    Specimen: Blood Updated: 02/12/22 0748     WBC 12.50 10*3/mm3      RBC 4.44 10*6/mm3      Hemoglobin 12.4 g/dL      Hematocrit 40.1 %      MCV 90.3 fL      MCH 27.9 pg      MCHC 30.9 g/dL      RDW 12.9 %      RDW-SD 42.5 fl      MPV 10.1 fL      Platelets 380 10*3/mm3      Neutrophil % 79.5 %      Lymphocyte % 7.7 %      Monocyte % 9.9 %      Eosinophil % 1.9 %      Basophil % 0.3 %      Immature Grans % 0.7 %      Neutrophils, Absolute 9.93 10*3/mm3      Lymphocytes, Absolute 0.96 10*3/mm3      Monocytes, Absolute 1.24 10*3/mm3      Eosinophils, Absolute 0.24 10*3/mm3      Basophils, Absolute 0.04 10*3/mm3      Immature Grans, Absolute 0.09 10*3/mm3      nRBC 0.0 /100 WBC         Imaging Results (Last 72 Hours)     ** No results found for the last 72 hours. **              Assessment/Plan       Acute appendicitis    Acute respiratory failure with hypoxia (HCC)    Elevated serum creatinine    Leukocytosis      We will continue IV antibiotics 1 more day.  DC KANG drain.  If white count remains slightly elevated may repeat CT scan      Milly Valverde MD  02/14/22  10:15 CST        Electronically signed by Milly Valverde MD at 02/14/22 1016     Marco Cardenas MD at 02/13/22 1538          1           [facility]  INPATIENT PROGRESS NOTE    Patient Name: Kj Cerrato  Date of Admission: 2/8/2022  Today's Date: 02/13/22  Length of Stay: 5  Primary Care Physician: Philippe Oliveira DO    Subjective   Chief Complaint: Follow-up  HPI   Hospitalist service consulted on February 9 for acute hypoxic respiratory failure.  It is suspected that it was secondary to flash pulmonary edema intraoperatively.  Patient did well on Lasix.  His echocardiogram showed:    Results for orders placed during the hospital encounter of 02/08/22    Adult Transthoracic Echo Complete W/ Cont if  Necessary Per Protocol    Interpretation Summary  · The study is technically difficult for diagnosis  · Left ventricular ejection fraction appears to be 51 - 55%.  · Normal right ventricular cavity size noted. Mildly reduced right ventricular systolic function noted  · The left atrial cavity is mildly dilated.  · No hemodynamically significant valvular disease by Doppler exam.    He is now on 3.5 to 4 L nasal cannula with saturation 93 to 95%      Patient has hypoaeration in both chest x-ray but showed improvement in the haziness particularly in the right lung field on most recent x-ray dated February 10.  She has negative fluid balance.       Consulting provider was Dr. Valverde.  Patient is postoperative day 5 status post laparoscopic appendectomy with drain placement for perforated appendicitis.  Renal function is back to normal (creatinine has been as high as 2.38)      Patient seen and examined.  States that he is doing well.  He states that he tolerated his meal without any worsening abdominal pain nausea or vomiting.  He said he do the incentive spirometry about 15 times earlier  He said he sat on the chair 4 times since this morning.  He also mentioned he work with therapist today  Review of Systems     All pertinent negatives and positives are as above. All other systems have been reviewed and are negative unless otherwise stated.     Objective    Temp:  [98.4 °F (36.9 °C)-98.8 °F (37.1 °C)] 98.4 °F (36.9 °C)  Heart Rate:  [68-91] 68  Resp:  [16] 16  BP: (118-143)/(54-67) 143/67  Physical Exam   Pleasant man  No distress  Now on 2 L nasal cannula with O2 saturation of 91%  He reach about 1250 leah on incentive spirometry  GEN: Awake, alert, interactive, in NAD  HEENT: PERRLA, EOMI, Anicteric, Trachea midline  Lungs:  CTAB without wheezes or rales.  Heart: RRR, +S1/s2, no rub   ABD:  + bowel sounds, abdominal bandage in place, drain with minimal serosanguineous fluid.  Extremities: atraumatic, no cyanosis,  no pitting edema  Skin: no rashes or petechiae  Neuro: AAOx3, no focal deficits  Psych: normal mood & affect            Results Review:  I have reviewed the labs, radiology results, and diagnostic studies.    Laboratory Data:   Results from last 7 days   Lab Units 02/13/22  0731 02/12/22  0712 02/11/22  0515   WBC 10*3/mm3 11.41* 12.50* 12.40*   HEMOGLOBIN g/dL 12.7* 12.4* 13.2   HEMATOCRIT % 41.2 40.1 43.5   PLATELETS 10*3/mm3 385 380 387        Results from last 7 days   Lab Units 02/13/22  0731 02/12/22  0712 02/11/22  0515 02/09/22  0830 02/08/22  1955   SODIUM mmol/L 144 141 142   < > 142   POTASSIUM mmol/L 4.0 4.4 4.6   < > 4.7   CHLORIDE mmol/L 104 103 101   < > 104   CO2 mmol/L 32.0* 29.0 31.0*   < > 22.0   BUN mg/dL 33* 45* 49*   < > 33*   CREATININE mg/dL 1.26 1.46* 1.67*   < > 2.19*   CALCIUM mg/dL 9.2 9.3 9.3   < > 9.8   BILIRUBIN mg/dL  --   --   --   --  1.6*   ALK PHOS U/L  --   --   --   --  39   ALT (SGPT) U/L  --   --   --   --  13   AST (SGOT) U/L  --   --   --   --  15   GLUCOSE mg/dL 105* 101* 113*   < > 135*    < > = values in this interval not displayed.       Culture Data:   Blood Culture   Date Value Ref Range Status   02/08/2022 No growth at 4 days  Preliminary   02/08/2022 No growth at 4 days  Preliminary       Radiology Data:   Imaging Results (Last 24 Hours)     ** No results found for the last 24 hours. **          I have reviewed the patient's current medications.     Assessment/Plan     Active Hospital Problems    Diagnosis    • **Acute appendicitis    • Acute respiratory failure with hypoxia (HCC)    • Elevated serum creatinine    • Leukocytosis      · Status post lap abelardo for ruptured appendix.  Drain in place.  Postoperative follow-up per primary service.  On antibiotic  · Acute hypoxic respiratory failure -suspected by Dr. Phoebe is flash pulmonary edema intraoperatively.E F with preserved ejection fraction and no overt diastolic dysfunction.  Now on 2 L nasal cannula.  Encouraged  to continue on incentive spirometry.  There may also be component of hypoventilated lungs.  · Acute renal insufficiency perioperatively -back to normal  · Leukocytosis in the setting of perforated appendix.  Currently on Zosyn per primary service  · History of hypertension-on atenolol; monitor blood pressure        Continue present management  Wean extension off as tolerated.  Encourage incentive spirometry  Increase activities as tolerated    atenolol, 50 mg, Oral, Q24H  enoxaparin, 30 mg, Subcutaneous, Daily  finasteride, 5 mg, Oral, Daily  gabapentin, 300 mg, Oral, Nightly  piperacillin-tazobactam, 2.25 g, Intravenous, Q8H            Discharge Planning: Per primary service  Electronically signed by Marco Cardenas MD, 02/13/22, 15:38 CST.      Electronically signed by Marco Cardenas MD at 02/13/22 1555     Shanthi Vora MD at 02/13/22 0954          Shanthi Vora MD - General Surgery  Progress Note     LOS: 5 days   Patient Care Team:  Philippe Oliveira DO as PCP - General (Family Medicine)      Subjective     Interval History:      No acute events. Had 4 BMs. Passing flatus. Tolerating fulls. Denies nausea/vomiting.     Objective     Vital Signs  Temp:  [97.7 °F (36.5 °C)-98.8 °F (37.1 °C)] 98.4 °F (36.9 °C)  Heart Rate:  [63-91] 68  Resp:  [16] 16  BP: (118-143)/(54-73) 143/67    Physical Exam:  General appearance - alert, well appearing, and in no distress  Mental status - alert, oriented to person, place, and time  Eyes - sclera anicteric  Neck - supple, no significant adenopathy  Heart - normal rate and regular rhythm  Abdomen - soft, non-distende, appropriately TTP, KANG with serous output       Results Review:    Lab Results (last 24 hours)     Procedure Component Value Units Date/Time    Basic Metabolic Panel [[id number]]  (Abnormal) Collected: 02/13/22 0731    Specimen: Blood Updated: 02/13/22 0824     Glucose 105 mg/dL      BUN 33 mg/dL      Creatinine 1.26 mg/dL       Sodium 144 mmol/L      Potassium 4.0 mmol/L      Chloride 104 mmol/L      CO2 32.0 mmol/L      Calcium 9.2 mg/dL      eGFR Non African Amer 54 mL/min/1.73      BUN/Creatinine Ratio 26.2     Anion Gap 8.0 mmol/L     Narrative:      GFR Normal >60  Chronic Kidney Disease <60  Kidney Failure <15      Manual Differential [[id number]]  (Abnormal) Collected: 02/13/22 0731    Specimen: Blood Updated: 02/13/22 0823     Neutrophil % 71.0 %      Lymphocyte % 10.0 %      Monocyte % 14.0 %      Eosinophil % 3.0 %      Basophil % 1.0 %      Atypical Lymphocyte % 1.0 %      Neutrophils Absolute 8.10 10*3/mm3      Lymphocytes Absolute 1.26 10*3/mm3      Monocytes Absolute 1.60 10*3/mm3      Eosinophils Absolute 0.34 10*3/mm3      Basophils Absolute 0.11 10*3/mm3      RBC Morphology Normal     WBC Morphology Normal     Platelet Morphology Normal    CBC & Differential [[id number]]  (Abnormal) Collected: 02/13/22 0731    Specimen: Blood Updated: 02/13/22 0823    Narrative:      The following orders were created for panel order CBC & Differential.  Procedure                               Abnormality         Status                     ---------                               -----------         ------                     CBC Auto Differential[[id number]]        Abnormal            Final result                 Please view results for these tests on the individual orders.    CBC Auto Differential [[id number]]  (Abnormal) Collected: 02/13/22 0731    Specimen: Blood Updated: 02/13/22 0823     WBC 11.41 10*3/mm3      RBC 4.54 10*6/mm3      Hemoglobin 12.7 g/dL      Hematocrit 41.2 %      MCV 90.7 fL      MCH 28.0 pg      MCHC 30.8 g/dL      RDW 13.0 %      RDW-SD 42.9 fl      MPV 9.8 fL      Platelets 385 10*3/mm3     Blood Culture - Blood, Arm, Right [[id number]]  (Normal) Collected: 02/08/22 2145    Specimen: Blood from Arm, Right Updated: 02/12/22 2215     Blood Culture No growth at 4 days    Blood Culture - Blood, Arm, Right [[id number]]   (Normal) Collected: 02/08/22 2155    Specimen: Blood from Arm, Right Updated: 02/12/22 2215     Blood Culture No growth at 4 days        Imaging Results (Last 24 Hours)     ** No results found for the last 24 hours. **            Assessment/Plan       POD 5 s/p laparoscopic appendectomy with drain placement for perforated appendicitis:     - continue IV antibiotics   - Tolerating fulls with no nausea/vomiting/distention. Advance to GI soft diet   - Continue prn pain and nausea control   - Hospitalist following renal function and pulmonary function - appreciate their assistance.       Shanthi Vora MD  02/13/22  09:54 CST          Electronically signed by Shanthi Vora MD at 02/13/22 4172

## 2022-02-14 NOTE — PROGRESS NOTES
Milly Valverde MD Progress Note     LOS: 6 days   Patient Care Team:  Philippe Oliveira,  as PCP - General (Family Medicine)        Subjective     Doing well.  Bowel movements are becoming more solid.  Tolerating regular diet.    Objective     Vital Signs  Temp:  [98.2 °F (36.8 °C)-98.5 °F (36.9 °C)] 98.5 °F (36.9 °C)  Heart Rate:  [66-84] 84  Resp:  [16] 16  BP: (127-142)/(62-77) 142/77    Intake & Output (last 3 days)       02/11 0701  02/12 0700 02/12 0701  02/13 0700 02/13 0701  02/14 0700 02/14 0701  02/15 0700    P.O.  260 480     I.V. (mL/kg)  115.8 (1.4)      IV Piggyback  100 100     Total Intake(mL/kg)  475.8 (5.9) 580 (7.2)     Urine (mL/kg/hr) 1700 (0.9) 750 (0.4) 875 (0.5)     Drains 175 25 40 40    Stool  0      Total Output 1875 775 915 40    Net -1875 -299.2 -335 -40            Urine Unmeasured Occurrence   3 x     Stool Unmeasured Occurrence  4 x            Physical Exam:     General Appearance:    Alert, cooperative, in no acute distress   Lungs:     respirations regular, even and unlabored    Heart:    Regular rhythm and normal rate, normal S1 and S2, no            murmur, no gallop, no rub   Chest Wall:    No abnormalities observed   Abdomen:      Still little full especially on the right side.  KANG is serous   Extremities: No edema,    Results Review:     I reviewed the patient's new clinical results.    Lab Results (last 72 hours)     Procedure Component Value Units Date/Time    CBC Auto Differential [[id number]]  (Abnormal) Collected: 02/14/22 0905    Specimen: Blood Updated: 02/14/22 0933     WBC 14.05 10*3/mm3      RBC 4.84 10*6/mm3      Hemoglobin 13.3 g/dL      Hematocrit 43.4 %      MCV 89.7 fL      MCH 27.5 pg      MCHC 30.6 g/dL      RDW 13.0 %      RDW-SD 42.6 fl      MPV 9.8 fL      Platelets 443 10*3/mm3      nRBC 0.0 /100 WBC     Manual Differential [[id number]] Collected: 02/14/22 0905    Specimen: Blood Updated: 02/14/22 0920    CBC & Differential [[id number]]  (Abnormal)  Collected: 02/14/22 0905    Specimen: Blood Updated: 02/14/22 0919    Narrative:      The following orders were created for panel order CBC & Differential.  Procedure                               Abnormality         Status                     ---------                               -----------         ------                     CBC Auto Differential[[id number]]        Abnormal            Preliminary result           Please view results for these tests on the individual orders.    Basic Metabolic Panel [[id number]]  (Abnormal) Collected: 02/14/22 0708    Specimen: Blood Updated: 02/14/22 0751     Glucose 119 mg/dL      BUN 27 mg/dL      Creatinine 1.29 mg/dL      Sodium 142 mmol/L      Potassium 3.9 mmol/L      Chloride 103 mmol/L      CO2 28.0 mmol/L      Calcium 8.5 mg/dL      eGFR Non African Amer 52 mL/min/1.73      BUN/Creatinine Ratio 20.9     Anion Gap 11.0 mmol/L     Narrative:      GFR Normal >60  Chronic Kidney Disease <60  Kidney Failure <15      Blood Culture - Blood, Arm, Right [[id number]]  (Normal) Collected: 02/08/22 2145    Specimen: Blood from Arm, Right Updated: 02/13/22 2215     Blood Culture No growth at 5 days    Blood Culture - Blood, Arm, Right [[id number]]  (Normal) Collected: 02/08/22 2155    Specimen: Blood from Arm, Right Updated: 02/13/22 2215     Blood Culture No growth at 5 days    Basic Metabolic Panel [[id number]]  (Abnormal) Collected: 02/13/22 0731    Specimen: Blood Updated: 02/13/22 0824     Glucose 105 mg/dL      BUN 33 mg/dL      Creatinine 1.26 mg/dL      Sodium 144 mmol/L      Potassium 4.0 mmol/L      Chloride 104 mmol/L      CO2 32.0 mmol/L      Calcium 9.2 mg/dL      eGFR Non African Amer 54 mL/min/1.73      BUN/Creatinine Ratio 26.2     Anion Gap 8.0 mmol/L     Narrative:      GFR Normal >60  Chronic Kidney Disease <60  Kidney Failure <15      Manual Differential [[id number]]  (Abnormal) Collected: 02/13/22 0731    Specimen: Blood Updated: 02/13/22 0823     Neutrophil % 71.0 %       Lymphocyte % 10.0 %      Monocyte % 14.0 %      Eosinophil % 3.0 %      Basophil % 1.0 %      Atypical Lymphocyte % 1.0 %      Neutrophils Absolute 8.10 10*3/mm3      Lymphocytes Absolute 1.26 10*3/mm3      Monocytes Absolute 1.60 10*3/mm3      Eosinophils Absolute 0.34 10*3/mm3      Basophils Absolute 0.11 10*3/mm3      RBC Morphology Normal     WBC Morphology Normal     Platelet Morphology Normal    CBC & Differential [[id number]]  (Abnormal) Collected: 02/13/22 0731    Specimen: Blood Updated: 02/13/22 0823    Narrative:      The following orders were created for panel order CBC & Differential.  Procedure                               Abnormality         Status                     ---------                               -----------         ------                     CBC Auto Differential[[id number]]        Abnormal            Final result                 Please view results for these tests on the individual orders.    CBC Auto Differential [[id number]]  (Abnormal) Collected: 02/13/22 0731    Specimen: Blood Updated: 02/13/22 0823     WBC 11.41 10*3/mm3      RBC 4.54 10*6/mm3      Hemoglobin 12.7 g/dL      Hematocrit 41.2 %      MCV 90.7 fL      MCH 28.0 pg      MCHC 30.8 g/dL      RDW 13.0 %      RDW-SD 42.9 fl      MPV 9.8 fL      Platelets 385 10*3/mm3     Basic Metabolic Panel [[id number]]  (Abnormal) Collected: 02/12/22 0712    Specimen: Blood Updated: 02/12/22 0814     Glucose 101 mg/dL      BUN 45 mg/dL      Creatinine 1.46 mg/dL      Sodium 141 mmol/L      Potassium 4.4 mmol/L      Chloride 103 mmol/L      CO2 29.0 mmol/L      Calcium 9.3 mg/dL      eGFR Non African Amer 45 mL/min/1.73      BUN/Creatinine Ratio 30.8     Anion Gap 9.0 mmol/L     Narrative:      GFR Normal >60  Chronic Kidney Disease <60  Kidney Failure <15      Magnesium [[id number]]  (Normal) Collected: 02/12/22 0712    Specimen: Blood Updated: 02/12/22 0814     Magnesium 2.3 mg/dL     CBC & Differential [[id number]]  (Abnormal) Collected:  02/12/22 0712    Specimen: Blood Updated: 02/12/22 0748    Narrative:      The following orders were created for panel order CBC & Differential.  Procedure                               Abnormality         Status                     ---------                               -----------         ------                     CBC Auto Differential[[id number]]        Abnormal            Final result                 Please view results for these tests on the individual orders.    CBC Auto Differential [[id number]]  (Abnormal) Collected: 02/12/22 0712    Specimen: Blood Updated: 02/12/22 0748     WBC 12.50 10*3/mm3      RBC 4.44 10*6/mm3      Hemoglobin 12.4 g/dL      Hematocrit 40.1 %      MCV 90.3 fL      MCH 27.9 pg      MCHC 30.9 g/dL      RDW 12.9 %      RDW-SD 42.5 fl      MPV 10.1 fL      Platelets 380 10*3/mm3      Neutrophil % 79.5 %      Lymphocyte % 7.7 %      Monocyte % 9.9 %      Eosinophil % 1.9 %      Basophil % 0.3 %      Immature Grans % 0.7 %      Neutrophils, Absolute 9.93 10*3/mm3      Lymphocytes, Absolute 0.96 10*3/mm3      Monocytes, Absolute 1.24 10*3/mm3      Eosinophils, Absolute 0.24 10*3/mm3      Basophils, Absolute 0.04 10*3/mm3      Immature Grans, Absolute 0.09 10*3/mm3      nRBC 0.0 /100 WBC         Imaging Results (Last 72 Hours)     ** No results found for the last 72 hours. **              Assessment/Plan       Acute appendicitis    Acute respiratory failure with hypoxia (HCC)    Elevated serum creatinine    Leukocytosis      We will continue IV antibiotics 1 more day.  TRAE KANG drain.  If white count remains slightly elevated may repeat CT scan      Milly Valverde MD  02/14/22  10:15 CST

## 2022-02-14 NOTE — CASE MANAGEMENT/SOCIAL WORK
Continued Stay Note  SAUL Davis     Patient Name: Kj Cerrato  MRN: [id number]  Today's Date: 2/14/2022    Admit Date: 2/8/2022     Discharge Plan     Row Name 02/14/22 1143       Plan    Plan Home    Patient/Family in Agreement with Plan yes    Plan Comments Pt plans to go home at d/c. Will follow in case he will need home oxygen at d/c.               Discharge Codes    No documentation.                     BHAVNA Hayes

## 2022-02-14 NOTE — PROGRESS NOTES
1           [facility] Medicine Services  INPATIENT PROGRESS NOTE    Patient Name: Kj Cerrato  Date of Admission: 2/8/2022  Today's Date: 02/14/22  Length of Stay: 6  Primary Care Physician: Philippe Oliveira DO    Subjective   Chief Complaint: Follow-up  HPI     Hospitalist service consulted on February 9 for acute hypoxic respiratory failure.  It is suspected that it was secondary to flash pulmonary edema intraoperatively.  Patient did well on Lasix.    EF is normal at 51 to 55%    Discussed with nurse Sepideh  Patient reportedly doing well  Reviewed PT note.  Patient reportedly had O2 in the mid 80s while at rest.  Patient was placed on oxygen.  So far patient is back on room air with saturation anywhere from 92 and 93%    Renal function improved  Oxygenation improved  Patient has negative fluid balance of about 600 mL the past couple of days.      Patient is doing a lot better.  He is seated around the recliner  He is anticipating that he will be going home tomorrow.  Significant other is at bedside    Review of Systems     All pertinent negatives and positives are as above. All other systems have been reviewed and are negative unless otherwise stated.     Objective    Temp:  [98.2 °F (36.8 °C)-98.5 °F (36.9 °C)] 98.4 °F (36.9 °C)  Heart Rate:  [66-84] 68  Resp:  [16] 16  BP: (127-142)/(62-77) 131/68  Physical Exam  Pleasant man  No distress  He is in room air  No distress   Seated on recliner having supper  GEN: Awake, alert, interactive, in NAD  HEENT: PERRLA, EOMI, Anicteric, Trachea midline  Lungs:  CTAB without wheezes or rales.  Heart: RRR, +S1/s2, no rub   ABD:  + bowel sounds, abdominal bandage in place, drain with minimal serosanguineous fluid.  Extremities: atraumatic, no cyanosis, no pitting edema  Skin: no rashes or petechiae  Neuro: AAOx3, no focal deficits  Psych: normal mood & affect      Results Review:  I have reviewed the labs, radiology results, and  diagnostic studies.    Laboratory Data:   Results from last 7 days   Lab Units 02/14/22  0905 02/13/22  0731 02/12/22  0712   WBC 10*3/mm3 14.05* 11.41* 12.50*   HEMOGLOBIN g/dL 13.3 12.7* 12.4*   HEMATOCRIT % 43.4 41.2 40.1   PLATELETS 10*3/mm3 443 385 380        Results from last 7 days   Lab Units 02/14/22  0708 02/13/22  0731 02/12/22  0712 02/09/22  0830 02/08/22  1955   SODIUM mmol/L 142 144 141   < > 142   POTASSIUM mmol/L 3.9 4.0 4.4   < > 4.7   CHLORIDE mmol/L 103 104 103   < > 104   CO2 mmol/L 28.0 32.0* 29.0   < > 22.0   BUN mg/dL 27* 33* 45*   < > 33*   CREATININE mg/dL 1.29* 1.26 1.46*   < > 2.19*   CALCIUM mg/dL 8.5* 9.2 9.3   < > 9.8   BILIRUBIN mg/dL  --   --   --   --  1.6*   ALK PHOS U/L  --   --   --   --  39   ALT (SGPT) U/L  --   --   --   --  13   AST (SGOT) U/L  --   --   --   --  15   GLUCOSE mg/dL 119* 105* 101*   < > 135*    < > = values in this interval not displayed.       Culture Data:   Blood Culture   Date Value Ref Range Status   02/08/2022 No growth at 5 days  Final   02/08/2022 No growth at 5 days  Final       Radiology Data:   Imaging Results (Last 24 Hours)     Procedure Component Value Units Date/Time    CT Abdomen Pelvis With Contrast [[id number]] Collected: 02/14/22 1249     Updated: 02/14/22 1259    Narrative:      CT ABDOMEN PELVIS W CONTRAST- 2/14/2022 11:12 AM CST     HISTORY: Abdominal pain, fever, post-op; K35.80-Unspecified acute  appendicitis; K37-Unspecified appendicitis; R13.10-Dysphagia,  unspecified; Z74.09-Other reduced mobility, appendectomy on 2/8/2022     COMPARISON: 2/8/2022     DOSE LENGTH PRODUCT: 320 mGy cm. Automated exposure control was also  utilized to decrease patient radiation dose.     TECHNIQUE: Axial images the abdomen pelvis are obtained following IV  contrast. No oral contrast administered.     FINDINGS:  There is no enhancing liver mass. 10 mm hypodensity along the  anterior inferior right hepatic lobe may represent  perihepatic  inflammation/fluid. The spleen, pancreas, gallbladder, and adrenal  glands are unremarkable. There are bilateral renal cysts. No enhancing  renal mass identified. Nonobstructing inferior left intrarenal stone  suspected. No hydronephrosis. Air within the bladder may relate to  recent Sánchez catheterization. Coarse prostate calcification.     Small volume of free fluid within the lower pelvis likely reactive.  Right lower quadrant surgical drain. No discrete loculated abscess  collection. No inflammatory changes within the right mesentery. Moderate  stool of the colon. Sigmoid colonic diverticulosis. No small bowel  dilatation. Diffuse vascular calcification with no regional aneurysm.  Dense right greater than left proximal renal artery calcification. Fatty  containing right inguinal hernia. No pathologic lymphadenopathy.     Patchy bibasilar atelectasis suspected.     No focal destructive osseous lesions.       Impression:      1. Postoperative changes from recent appendectomy. Surgical drain  remains within the right lower quadrant. Inflammatory changes of the  mesentery with no drainable loculated abscess collection. Small volume  reactive free fluid within the lower pelvis.  2. Patchy bibasilar atelectasis.  3. Nonobstructing left intrarenal stones. Bilateral renal cysts. No  hydronephrosis.  4. Moderately dense diffuse vascular calcification with no regional  aneurysm.  This report was finalized on 02/14/2022 12:56 by Dr. Edilma Ramirez MD.          I have reviewed the patient's current medications.     Assessment/Plan     Active Hospital Problems    Diagnosis    • **Acute appendicitis    • Acute respiratory failure with hypoxia (HCC)    • Elevated serum creatinine    • Leukocytosis      · Status post lap abelardo for ruptured appendix.  Drain in place.  Postoperative follow-up per primary service.  On antibiotic  · Acute hypoxic respiratory failure -suspected by Dr. Sandhu is flash pulmonary edema  intraoperatively.E F with preserved ejection fraction and no overt diastolic dysfunction.  Now on 2 L nasal cannula.  Encouraged to continue on incentive spirometry.  There may also be component of hypoventilated lungs.  · Acute renal insufficiency perioperatively -back to normal  · Leukocytosis in the setting of perforated appendix.  Currently on Zosyn per primary service  · History of hypertension-on atenolol; monitor blood pressure     Doing better from my standpoint  Continue present management per primary service  discusssed with nurse Sepideh Kebede incentive spirometry      atenolol, 50 mg, Oral, Q24H  enoxaparin, 30 mg, Subcutaneous, Daily  finasteride, 5 mg, Oral, Daily  gabapentin, 300 mg, Oral, Nightly  piperacillin-tazobactam, 2.25 g, Intravenous, Q8H            Discharge Planning: Per primary service    Electronically signed by Marco Cardenas MD, 02/14/22, 17:30 CST.

## 2022-02-14 NOTE — PLAN OF CARE
Goal Outcome Evaluation:  Plan of Care Reviewed With: patient   Progress: improving      Pt with no c/o pain thus far this shift. IID between IV abx. Up with asst x1 and walker. Voiding per BRP. Pt placed on 2L while sleeping this shift for sats dipping into the 80's; sat since maintained in mid 90's. VSS. Safety maintained.

## 2022-02-14 NOTE — PLAN OF CARE
Goal Outcome Evaluation:  Plan of Care Reviewed With: patient        Progress: no change  Outcome Summary: No c/o pain today. IID. IV abx. Tolerating regular/gi soft diet; very little appetite today. WBC 14.05 this am. CT abd/pelvis said no drainable abscess collection. KANG to be d/c'd. VSS.

## 2022-02-14 NOTE — PLAN OF CARE
VSS, patient has been up and ambulating in hallway with SBA and rolling walker, currently on room air - monitoring O2 sats, ning d/cd - currently DTV, IV abx as ordered, medicated X1 for pain, new order for pO pain medication as needed, home meds resumed, tolerating GI soft diet    Problem: Adult Inpatient Plan of Care  Goal: Plan of Care Review  Outcome: Ongoing, Progressing  Flowsheets  Taken 2/13/2022 1830 by Cathy Patel RN  Progress: improving  Taken 2/13/2022 1108 by Ernestina Chinchilla, PT DPT  Plan of Care Reviewed With: patient   Goal Outcome Evaluation:           Progress: improving

## 2022-02-14 NOTE — THERAPY TREATMENT NOTE
Acute Care - Physical Therapy Treatment Note  [facility]     Patient Name: Kj Cerrato  : 1932  MRN: [id number]  Today's Date: 2022      Visit Dx:     ICD-10-CM ICD-9-CM   1. Acute appendicitis, unspecified acute appendicitis type  K35.80 540.9   2. Appendicitis  K37 541   3. Dysphagia, unspecified type  R13.10 787.20   4. Impaired mobility  Z74.09 799.89     Patient Active Problem List   Diagnosis   • Renal artery stenosis (HCC)   • Aortoiliac occlusive disease (HCC)   • Bilateral carotid artery stenosis   • Acute appendicitis   • Acute respiratory failure with hypoxia (HCC)   • Elevated serum creatinine   • Leukocytosis     Past Medical History:   Diagnosis Date   • Atherosclerosis    • BPH (benign prostatic hyperplasia)    • Carotid artery disease (HCC)    • High cholesterol    • Hypertension    • Kidney cysts    • Kidney stone    • Renal artery stenosis (HCC)    • Renal artery stenosis (HCC) 2018   • Renal insufficiency    • Solitary lung nodule    • Stroke ([facility])     Residual speech problems.     Past Surgical History:   Procedure Laterality Date   • APPENDECTOMY N/A 2022    Procedure: APPENDECTOMY LAPAROSCOPIC;  Surgeon: Milly Valverde MD;  Location: [facility];  Service: General;  Laterality: N/A;   • CAROTID ENDARTERECTOMY Right     Per Dr Idris Dotson   • PROSTATE SURGERY       PT Assessment (last 12 hours)     PT Evaluation and Treatment     Row Name 22 0934          Physical Therapy Time and Intention    Subjective Information no complaints  -     Document Type therapy note (daily note)  -KIKE     Mode of Treatment physical therapy  -KIKE     Row Name 2234          General Information    Existing Precautions/Restrictions fall; oxygen therapy device and L/min  -KIKE     Row Name 2234          Pain Scale: Numbers Pre/Post-Treatment    Pretreatment Pain Rating 0/10 - no pain  -KIKE     Row Name 2234          Bed Mobility    Supine-Sit [place] (Bed  Mobility) supervision  -KIKE     Sit-Supine [place] (Bed Mobility) supervision  -KIKE     Row Name 02/14/22 0934          Transfers    Sit-Stand [place] (Transfers) standby assist  -KIKE     Row Name 02/14/22 0934          Gait/Stairs (Locomotion)    [place] Level (Gait) verbal cues; contact guard  -KIKE     Assistive Device (Gait) walker, front-wheeled  -KIKE     Distance in Feet (Gait) 400  -KIKE     Bilateral Gait Deviations forward flexed posture  -KIKE     Row Name             Wound 02/08/22 2235 abdomen Incision    Wound - Properties Group Placement Date: 02/08/22 - Placement Time: 2235 -SH Location: abdomen  -SH Primary Wound Type: Incision  -SH     Retired Wound - Properties Group Date first assessed: 02/08/22 -SH Time first assessed: 2235 -SH Location: abdomen  -SH Primary Wound Type: Incision  -SH     Row Name 02/14/22 0934          Vital Signs    Pre SpO2 (%) 85  -KIKE     O2 Delivery Pre Treatment room air  -KIKE     Intra SpO2 (%) 96  -KIKE     O2 Delivery Intra Treatment nasal cannula  2L decreased to 1L, O2 stayed at 93%  -KIKE     Post SpO2 (%) 93  -KIKE     O2 Delivery Post Treatment nasal cannula  -KIKE     Pre Patient Position Sitting  -KIKE     Intra Patient Position Standing  -KIKE     Post Patient Position Sitting  -KIKE     Row Name 02/14/22 0934          Positioning and Restraints    Pre-Treatment Position in bed  -KIKE     Post Treatment Position bed  -KIKE     In Bed fowlers; call light within reach; encouraged to call for assist; side rails up x2  -KIKE           User Key  (r) = Recorded By, (t) = Taken By, (c) = Cosigned By    Initials Name Provider Type    Ashwin Coles PTA Physical Therapy Assistant    Ivonne Vance, RN Registered Nurse                Physical Therapy Education                 Title: PT OT SLP Therapies (In Progress)     Topic: Physical Therapy (In Progress)     Point: Mobility training (Done)     Learning Progress Summary           Patient Acceptance, E, VU by SB at  2/13/2022 1128    Comment: pt edu on POC, benefits of act, d/c plans                   Point: Home exercise program (Not Started)     Learner Progress:  Not documented in this visit.          Point: Body mechanics (Done)     Learning Progress Summary           Patient Acceptance, E, VU by SB at 2/13/2022 1128    Comment: pt edu on POC, benefits of act, d/c plans                   Point: Precautions (Done)     Learning Progress Summary           Patient Acceptance, E, VU by SB at 2/13/2022 1128    Comment: pt edu on POC, benefits of act, d/c plans                               User Key     Initials Effective Dates Name Provider Type Discipline     06/16/21 -  Ernestina Chinchilla, PT DPT Physical Therapist PT              PT Recommendation and Plan     Plan of Care Reviewed With: patient  Progress: improving  Outcome Summary: Pt performed bed mobility with supervision.  Transfered sit to stand with SBA.  Amb 400' with RWX and SBA.  Pt was on room air at rest with O2 at 85%, donned O2 at 1L, increased to 93% and remained in the 90's during tx.  Left pt in bed on 1L  O2.       Time Calculation:    PT Charges     Row Name 02/14/22 0934             Time Calculation    Start Time 0934  -KIKE      Stop Time 0948  -KIKE      Time Calculation (min) 14 min  -KIKE      PT Received On 02/14/22  -KIKE              Time Calculation- PT    Total Timed Code Minutes- PT 14 minute(s)  -KIKE              Timed Charges    14725 - Gait Training Minutes  14  -KIKE              Total Minutes    Timed Charges Total Minutes 14  -KIKE       Total Minutes 14  -KIKE            User Key  (r) = Recorded By, (t) = Taken By, (c) = Cosigned By    Initials Name Provider Type    Ashwin Coles PTA Physical Therapy Assistant              Therapy Charges for Today     Code Description Service Date Service Provider Modifiers Qty    [id number] HC GAIT TRAINING EA 15 MIN 2/14/2022 Ashwin Dumas PTA GP 1          PT G-Codes  Outcome Measure Options: AM-PAC 6  Clicks Basic Mobility (PT)  AM-PAC 6 Clicks Score (PT): 19    Ashwin Dumas, PTA  2/14/2022

## 2022-02-15 LAB
ANION GAP SERPL CALCULATED.3IONS-SCNC: 9 MMOL/L (ref 5–15)
BASOPHILS # BLD AUTO: 0.05 10*3/MM3 (ref 0–0.2)
BASOPHILS NFR BLD AUTO: 0.3 % (ref 0–1.5)
BUN SERPL-MCNC: 22 MG/DL (ref 8–23)
BUN/CREAT SERPL: 16.1 (ref 7–25)
CALCIUM SPEC-SCNC: 8.6 MG/DL (ref 8.6–10.5)
CHLORIDE SERPL-SCNC: 104 MMOL/L (ref 98–107)
CO2 SERPL-SCNC: 29 MMOL/L (ref 22–29)
CREAT SERPL-MCNC: 1.37 MG/DL (ref 0.76–1.27)
DEPRECATED RDW RBC AUTO: 42.7 FL (ref 37–54)
EOSINOPHIL # BLD AUTO: 0.38 10*3/MM3 (ref 0–0.4)
EOSINOPHIL NFR BLD AUTO: 2.6 % (ref 0.3–6.2)
ERYTHROCYTE [DISTWIDTH] IN BLOOD BY AUTOMATED COUNT: 13 % (ref 12.3–15.4)
GFR SERPL CREATININE-BSD FRML MDRD: 49 ML/MIN/1.73
GLUCOSE SERPL-MCNC: 103 MG/DL (ref 65–99)
HCT VFR BLD AUTO: 42.5 % (ref 37.5–51)
HGB BLD-MCNC: 13.3 G/DL (ref 13–17.7)
IMM GRANULOCYTES # BLD AUTO: 0.54 10*3/MM3 (ref 0–0.05)
IMM GRANULOCYTES NFR BLD AUTO: 3.7 % (ref 0–0.5)
LYMPHOCYTES # BLD AUTO: 1.82 10*3/MM3 (ref 0.7–3.1)
LYMPHOCYTES NFR BLD AUTO: 12.6 % (ref 19.6–45.3)
MCH RBC QN AUTO: 28.1 PG (ref 26.6–33)
MCHC RBC AUTO-ENTMCNC: 31.3 G/DL (ref 31.5–35.7)
MCV RBC AUTO: 89.9 FL (ref 79–97)
MONOCYTES # BLD AUTO: 1.54 10*3/MM3 (ref 0.1–0.9)
MONOCYTES NFR BLD AUTO: 10.7 % (ref 5–12)
NEUTROPHILS NFR BLD AUTO: 10.13 10*3/MM3 (ref 1.7–7)
NEUTROPHILS NFR BLD AUTO: 70.1 % (ref 42.7–76)
NRBC BLD AUTO-RTO: 0 /100 WBC (ref 0–0.2)
PLATELET # BLD AUTO: 450 10*3/MM3 (ref 140–450)
PMV BLD AUTO: 9.8 FL (ref 6–12)
POTASSIUM SERPL-SCNC: 4.3 MMOL/L (ref 3.5–5.2)
RBC # BLD AUTO: 4.73 10*6/MM3 (ref 4.14–5.8)
SODIUM SERPL-SCNC: 142 MMOL/L (ref 136–145)
WBC NRBC COR # BLD: 14.46 10*3/MM3 (ref 3.4–10.8)

## 2022-02-15 PROCEDURE — 25010000002 PIPERACILLIN SOD-TAZOBACTAM PER 1 G: Performed by: SPECIALIST

## 2022-02-15 PROCEDURE — 97116 GAIT TRAINING THERAPY: CPT

## 2022-02-15 PROCEDURE — 85025 COMPLETE CBC W/AUTO DIFF WBC: CPT | Performed by: SPECIALIST

## 2022-02-15 PROCEDURE — 80048 BASIC METABOLIC PNL TOTAL CA: CPT | Performed by: SPECIALIST

## 2022-02-15 PROCEDURE — 25010000002 ENOXAPARIN PER 10 MG: Performed by: SPECIALIST

## 2022-02-15 RX ORDER — SODIUM CHLORIDE 9 MG/ML
100 INJECTION, SOLUTION INTRAVENOUS CONTINUOUS
Status: DISCONTINUED | OUTPATIENT
Start: 2022-02-15 | End: 2022-02-16 | Stop reason: HOSPADM

## 2022-02-15 RX ORDER — METRONIDAZOLE 500 MG/1
500 TABLET ORAL EVERY 8 HOURS SCHEDULED
Status: DISCONTINUED | OUTPATIENT
Start: 2022-02-15 | End: 2022-02-16 | Stop reason: HOSPADM

## 2022-02-15 RX ORDER — LEVOFLOXACIN 500 MG/1
500 TABLET, FILM COATED ORAL EVERY 24 HOURS
Status: DISCONTINUED | OUTPATIENT
Start: 2022-02-15 | End: 2022-02-16 | Stop reason: HOSPADM

## 2022-02-15 RX ADMIN — LEVOFLOXACIN 500 MG: 500 TABLET, FILM COATED ORAL at 10:39

## 2022-02-15 RX ADMIN — TAZOBACTAM SODIUM AND PIPERACILLIN SODIUM 2.25 G: 250; 2 INJECTION, SOLUTION INTRAVENOUS at 00:34

## 2022-02-15 RX ADMIN — METRONIDAZOLE 500 MG: 500 TABLET ORAL at 20:32

## 2022-02-15 RX ADMIN — FINASTERIDE 5 MG: 5 TABLET, FILM COATED ORAL at 08:51

## 2022-02-15 RX ADMIN — SODIUM CHLORIDE 100 ML/HR: 9 INJECTION, SOLUTION INTRAVENOUS at 11:47

## 2022-02-15 RX ADMIN — SODIUM CHLORIDE 100 ML/HR: 9 INJECTION, SOLUTION INTRAVENOUS at 20:32

## 2022-02-15 RX ADMIN — TAZOBACTAM SODIUM AND PIPERACILLIN SODIUM 2.25 G: 250; 2 INJECTION, SOLUTION INTRAVENOUS at 07:24

## 2022-02-15 RX ADMIN — METRONIDAZOLE 500 MG: 500 TABLET ORAL at 13:18

## 2022-02-15 RX ADMIN — ENOXAPARIN SODIUM 30 MG: 30 INJECTION SUBCUTANEOUS at 08:53

## 2022-02-15 RX ADMIN — ATENOLOL 50 MG: 50 TABLET ORAL at 08:49

## 2022-02-15 RX ADMIN — GABAPENTIN 300 MG: 300 CAPSULE ORAL at 20:32

## 2022-02-15 NOTE — PLAN OF CARE
Goal Outcome Evaluation:  Plan of Care Reviewed With: patient        Progress: improving  Outcome Summary: No c/o pain. Lap x3 d/i with g/t. IVF initiated today. Sats acceptable on RA. Up to chair for most of day. Voiding. Tolerating regular/gi soft diet. VSS.

## 2022-02-15 NOTE — PROGRESS NOTES
1           [facility] Medicine Services  INPATIENT PROGRESS NOTE    Patient Name: Kj Cerrato  Date of Admission: 2/8/2022  Today's Date: 02/15/22  Length of Stay: 7  Primary Care Physician: Philippe Oliveira DO    Subjective   Chief Complaint: Follow-up  HPI   Patient has poor oral intake as documented in input and output.  Noted slow increased in creatinine    Patient maintaining O2 in room air anywhere from 90 to 94%. Patient encouraged to continue using incentive spirometry    Noted as well patient has been switched to oral antibiotic by primary service.    States urinating well  Expressed that he was reaching 1500 Jarod on incentive spirometry.  He also mentioned that the drain was removed yesterday.  Review of Systems     All pertinent negatives and positives are as above. All other systems have been reviewed and are negative unless otherwise stated.     Objective    Temp:  [98.2 °F (36.8 °C)-98.6 °F (37 °C)] 98.4 °F (36.9 °C)  Heart Rate:  [66-79] 79  Resp:  [16-18] 16  BP: (125-150)/(60-68) 125/66  Physical Exam  Pleasant man  No distress  He is in room air  No distress   Seated on recliner working on incentive spirometry when I came in.  He was glad to tell me that he was reaching 1500 and it was hard work.  GEN: Awake, alert, interactive, in NAD  HEENT: PERRLA, EOMI, Anicteric, Trachea midline  Lungs:  CTAB without wheezes or rales.  Heart: RRR, +S1/s2, no rub   ABD:  + bowel sounds, abdominal bandage in place  Extremities: atraumatic, no cyanosis, no pitting edema  Skin: no rashes or petechiae  Neuro: AAOx3, no focal deficits  Psych: normal mood & affect      Results Review:  I have reviewed the labs, radiology results, and diagnostic studies.    Laboratory Data:   Results from last 7 days   Lab Units 02/15/22  0631 02/14/22  0905 02/13/22  0731   WBC 10*3/mm3 14.46* 14.05* 11.41*   HEMOGLOBIN g/dL 13.3 13.3 12.7*   HEMATOCRIT % 42.5 43.4 41.2   PLATELETS 10*3/mm3 450  443 385        Results from last 7 days   Lab Units 02/15/22  0631 02/14/22  0708 02/13/22  0731 02/09/22  0830 02/08/22  1955   SODIUM mmol/L 142 142 144   < > 142   POTASSIUM mmol/L 4.3 3.9 4.0   < > 4.7   CHLORIDE mmol/L 104 103 104   < > 104   CO2 mmol/L 29.0 28.0 32.0*   < > 22.0   BUN mg/dL 22 27* 33*   < > 33*   CREATININE mg/dL 1.37* 1.29* 1.26   < > 2.19*   CALCIUM mg/dL 8.6 8.5* 9.2   < > 9.8   BILIRUBIN mg/dL  --   --   --   --  1.6*   ALK PHOS U/L  --   --   --   --  39   ALT (SGPT) U/L  --   --   --   --  13   AST (SGOT) U/L  --   --   --   --  15   GLUCOSE mg/dL 103* 119* 105*   < > 135*    < > = values in this interval not displayed.       Culture Data:   Blood Culture   Date Value Ref Range Status   02/08/2022 No growth at 5 days  Final   02/08/2022 No growth at 5 days  Final       Radiology Data:   Imaging Results (Last 24 Hours)     Procedure Component Value Units Date/Time    CT Abdomen Pelvis With Contrast [[id number]] Collected: 02/14/22 1249     Updated: 02/14/22 1259    Narrative:      CT ABDOMEN PELVIS W CONTRAST- 2/14/2022 11:12 AM CST     HISTORY: Abdominal pain, fever, post-op; K35.80-Unspecified acute  appendicitis; K37-Unspecified appendicitis; R13.10-Dysphagia,  unspecified; Z74.09-Other reduced mobility, appendectomy on 2/8/2022     COMPARISON: 2/8/2022     DOSE LENGTH PRODUCT: 320 mGy cm. Automated exposure control was also  utilized to decrease patient radiation dose.     TECHNIQUE: Axial images the abdomen pelvis are obtained following IV  contrast. No oral contrast administered.     FINDINGS:  There is no enhancing liver mass. 10 mm hypodensity along the  anterior inferior right hepatic lobe may represent perihepatic  inflammation/fluid. The spleen, pancreas, gallbladder, and adrenal  glands are unremarkable. There are bilateral renal cysts. No enhancing  renal mass identified. Nonobstructing inferior left intrarenal stone  suspected. No hydronephrosis. Air within the bladder may  relate to  recent Sánchez catheterization. Coarse prostate calcification.     Small volume of free fluid within the lower pelvis likely reactive.  Right lower quadrant surgical drain. No discrete loculated abscess  collection. No inflammatory changes within the right mesentery. Moderate  stool of the colon. Sigmoid colonic diverticulosis. No small bowel  dilatation. Diffuse vascular calcification with no regional aneurysm.  Dense right greater than left proximal renal artery calcification. Fatty  containing right inguinal hernia. No pathologic lymphadenopathy.     Patchy bibasilar atelectasis suspected.     No focal destructive osseous lesions.       Impression:      1. Postoperative changes from recent appendectomy. Surgical drain  remains within the right lower quadrant. Inflammatory changes of the  mesentery with no drainable loculated abscess collection. Small volume  reactive free fluid within the lower pelvis.  2. Patchy bibasilar atelectasis.  3. Nonobstructing left intrarenal stones. Bilateral renal cysts. No  hydronephrosis.  4. Moderately dense diffuse vascular calcification with no regional  aneurysm.  This report was finalized on 02/14/2022 12:56 by Dr. Edilma Ramirez MD.          I have reviewed the patient's current medications.     Assessment/Plan     Active Hospital Problems    Diagnosis    • **Acute appendicitis    • Acute respiratory failure with hypoxia (HCC)    • Elevated serum creatinine    • Leukocytosis        · Status post lap abelardo for ruptured appendix.  Drain in place.  Postoperative follow-up per primary service.  On antibiotic  · Acute hypoxic respiratory failure -suspected by Dr. Sandhu is flash pulmonary edema intraoperatively.E F with preserved ejection fraction and no overt diastolic dysfunction.  Now on room air. Encouraged to continue on incentive spirometry.  There may also be component of hypoventilated lungs.  · Acute renal insufficiency perioperatively -patient has poor oral  intake. Also had IV contrast exam today. Slightly increase in creatinine compared couple of days ago. Will support with IV fluid while in the hospital and monitor renal function, fluid status.  · Leukocytosis in the setting of perforated appendix. Patient been switched by primary service to levofloxacin and metronidazole.  · History of hypertension-on atenolol; monitor blood pressure. Stable     Other plans per primary service  atenolol, 50 mg, Oral, Q24H  enoxaparin, 30 mg, Subcutaneous, Daily  finasteride, 5 mg, Oral, Daily  gabapentin, 300 mg, Oral, Nightly  levoFLOXacin, 500 mg, Oral, Q24H  metroNIDAZOLE, 500 mg, Oral, Q8H    Discussed with nurse Bunn                Discharge Planning:tbd      Electronically signed by Marco Cardenas MD, 02/15/22, 11:26 CST.

## 2022-02-15 NOTE — PROGRESS NOTES
CT scan reviewed.  No abscess noted.  KANG drain removed.  His white count is slightly up today.  We will switch him to oral Levaquin and Flagyl as there is still a fair amount of inflammatory changes in the right lower quadrant seen on CT scan.  His exam is slightly improved with his tenderness over there.  We will continue to monitor daily white count tomorrow.  Hopefully this will trend down he can go home on oral antibiotics

## 2022-02-15 NOTE — PLAN OF CARE
Goal Outcome Evaluation:  Plan of Care Reviewed With: patient        Progress: improving  Outcome Summary: Ntn follow up. Pt is on a GI soft diet. He is tolerating diet. Appetite has improved, he has consumed 75% of the last 3 meals. Hoping to go home soon. Cont to follow and encourage intake.

## 2022-02-15 NOTE — PLAN OF CARE
Goal Outcome Evaluation:  Plan of Care Reviewed With: patient        Progress: improving  Outcome Summary: Pt was sitting up in the chair on room air with O2 at 93%.  transfered sit to stand with SBA, Amb 400' with CGA on room air. Pt's O2 sats remained in the 90's thoughout tx.

## 2022-02-15 NOTE — PLAN OF CARE
Goal Outcome Evaluation:  Plan of Care Reviewed With: patient        Progress: improving  Outcome Summary: PO pain med x 1. IID. IV abx given as ordered. Lap x 3 with g/t. Safety maintained.

## 2022-02-15 NOTE — THERAPY TREATMENT NOTE
Acute Care - Physical Therapy Treatment Note  [facility]     Patient Name: Kj Cerrato  : 1932  MRN: [id number]  Today's Date: 2/15/2022      Visit Dx:     ICD-10-CM ICD-9-CM   1. Acute appendicitis, unspecified acute appendicitis type  K35.80 540.9   2. Appendicitis  K37 541   3. Dysphagia, unspecified type  R13.10 787.20   4. Impaired mobility  Z74.09 799.89     Patient Active Problem List   Diagnosis   • Renal artery stenosis (HCC)   • Aortoiliac occlusive disease (HCC)   • Bilateral carotid artery stenosis   • Acute appendicitis   • Acute respiratory failure with hypoxia (HCC)   • Elevated serum creatinine   • Leukocytosis     Past Medical History:   Diagnosis Date   • Atherosclerosis    • BPH (benign prostatic hyperplasia)    • Carotid artery disease (HCC)    • High cholesterol    • Hypertension    • Kidney cysts    • Kidney stone    • Renal artery stenosis (HCC)    • Renal artery stenosis (HCC) 2018   • Renal insufficiency    • Solitary lung nodule    • Stroke ([facility])     Residual speech problems.     Past Surgical History:   Procedure Laterality Date   • APPENDECTOMY N/A 2022    Procedure: APPENDECTOMY LAPAROSCOPIC;  Surgeon: Milly Valverde MD;  Location: [facility];  Service: General;  Laterality: N/A;   • CAROTID ENDARTERECTOMY Right     Per Dr Idris Dotson   • PROSTATE SURGERY       PT Assessment (last 12 hours)     PT Evaluation and Treatment     Row Name 02/15/22 1004          Physical Therapy Time and Intention    Subjective Information no complaints  -     Document Type therapy note (daily note)  -     Mode of Treatment physical therapy  -     Row Name 02/15/22 1004          General Information    Existing Precautions/Restrictions fall  -     Row Name 02/15/22 1004          Pain Scale: Numbers Pre/Post-Treatment    Pretreatment Pain Rating 0/10 - no pain  -     Row Name 02/15/22 1004          Bed Mobility    Comment (Bed Mobility) in the chair  -     Row Name 02/15/22  1004          Transfers    Sit-Stand Kings (Transfers) standby assist  -KIKE     Row Name 02/15/22 1004          Gait/Stairs (Locomotion)    [place]ings Level (Gait) contact guard  -KIKE     Assistive Device (Gait) --  no AD  -KIKE     Distance in Feet (Gait) 400  -KIKE     Bilateral Gait Deviations forward flexed posture  -KIKE     Row Name             Wound 02/08/22 2235 abdomen Incision    Wound - Properties Group Placement Date: 02/08/22 - Placement Time: 2235 -SH Location: abdomen  -SH Primary Wound Type: Incision  -SH     Retired Wound - Properties Group Date first assessed: 02/08/22 - Time first assessed: 2235 -SH Location: abdomen  -SH Primary Wound Type: Incision  -SH     Row Name 02/15/22 1004          Vital Signs    Pre SpO2 (%) 91  -KIKE     O2 Delivery Pre Treatment room air  -KIKE     Intra SpO2 (%) 92  -KIKE     O2 Delivery Intra Treatment room air  -KIKE     Post SpO2 (%) 93  -KIKE     O2 Delivery Post Treatment room air  -KIKE     Pre Patient Position Sitting  -KIKE     Intra Patient Position Standing  -KIKE     Post Patient Position Sitting  -KIKE     Row Name 02/15/22 1004          Positioning and Restraints    Pre-Treatment Position sitting in chair/recliner  -KIKE     Post Treatment Position chair  -KIKE     In Chair sitting; call light within reach; encouraged to call for assist; with family/caregiver  -KIKE           User Key  (r) = Recorded By, (t) = Taken By, (c) = Cosigned By    Initials Name Provider Type    Ashwin Coles, PTA Physical Therapy Assistant    Ivonne Vance, RN Registered Nurse                Physical Therapy Education                 Title: PT OT SLP Therapies (In Progress)     Topic: Physical Therapy (In Progress)     Point: Mobility training (Done)     Learning Progress Summary           Patient Acceptance, E, VU by SB at 2/13/2022 1128    Comment: pt edu on POC, benefits of act, d/c plans                   Point: Home exercise program (Not Started)     Learner Progress:  Not  documented in this visit.          Point: Body mechanics (Done)     Learning Progress Summary           Patient Acceptance, E, VU by SB at 2/13/2022 1128    Comment: pt edu on POC, benefits of act, d/c plans                   Point: Precautions (Done)     Learning Progress Summary           Patient Acceptance, E, VU by SB at 2/13/2022 1128    Comment: pt edu on POC, benefits of act, d/c plans                               User Key     Initials Effective Dates Name Provider Type Discipline     06/16/21 -  Ernestina Chinchilla, PT DPT Physical Therapist PT              PT Recommendation and Plan     Plan of Care Reviewed With: patient  Progress: improving  Outcome Summary: Pt was sitting up in the chair on room air with O2 at 93%.  transfered sit to stand with SBA, Amb 400' with CGA on room air. Pt's O2 sats remained in the 90's thoughout tx.       Time Calculation:    PT Charges     Row Name 02/15/22 1004             Time Calculation    Start Time 1004  -KIKE      Stop Time 1016  -KIKE      Time Calculation (min) 12 min  -KIKE      PT Received On 02/15/22  -KIKE              Time Calculation- PT    Total Timed Code Minutes- PT 12 minute(s)  -KIKE              Timed Charges    08447 - Gait Training Minutes  12  -KIKE              Total Minutes    Timed Charges Total Minutes 12  -KIKE       Total Minutes 12  -KIKE            User Key  (r) = Recorded By, (t) = Taken By, (c) = Cosigned By    Initials Name Provider Type    Ashwin Coles PTA Physical Therapy Assistant              Therapy Charges for Today     Code Description Service Date Service Provider Modifiers Qty    49048563122 HC GAIT TRAINING EA 15 MIN 2/14/2022 Ashwin Dumas PTA GP 1    77036073293 HC GAIT TRAINING EA 15 MIN 2/15/2022 Ashwin Dumas PTA GP 1          PT G-Codes  Outcome Measure Options: AM-PAC 6 Clicks Basic Mobility (PT)  AM-PAC 6 Clicks Score (PT): 19    Ashwin Dumas PTA  2/15/2022

## 2022-02-16 ENCOUNTER — READMISSION MANAGEMENT (OUTPATIENT)
Dept: CALL CENTER | Facility: HOSPITAL | Age: 87
End: 2022-02-16

## 2022-02-16 VITALS
HEART RATE: 71 BPM | OXYGEN SATURATION: 96 % | BODY MASS INDEX: 25.28 KG/M2 | RESPIRATION RATE: 18 BRPM | SYSTOLIC BLOOD PRESSURE: 158 MMHG | WEIGHT: 176.59 LBS | TEMPERATURE: 97.6 F | DIASTOLIC BLOOD PRESSURE: 63 MMHG | HEIGHT: 70 IN

## 2022-02-16 LAB
ANION GAP SERPL CALCULATED.3IONS-SCNC: 8 MMOL/L (ref 5–15)
BASOPHILS # BLD AUTO: 0.08 10*3/MM3 (ref 0–0.2)
BASOPHILS NFR BLD AUTO: 0.6 % (ref 0–1.5)
BUN SERPL-MCNC: 22 MG/DL (ref 8–23)
BUN/CREAT SERPL: 18.5 (ref 7–25)
CALCIUM SPEC-SCNC: 7.8 MG/DL (ref 8.6–10.5)
CHLORIDE SERPL-SCNC: 107 MMOL/L (ref 98–107)
CO2 SERPL-SCNC: 24 MMOL/L (ref 22–29)
CREAT SERPL-MCNC: 1.19 MG/DL (ref 0.76–1.27)
DEPRECATED RDW RBC AUTO: 42.2 FL (ref 37–54)
EOSINOPHIL # BLD AUTO: 0.38 10*3/MM3 (ref 0–0.4)
EOSINOPHIL NFR BLD AUTO: 2.7 % (ref 0.3–6.2)
ERYTHROCYTE [DISTWIDTH] IN BLOOD BY AUTOMATED COUNT: 13 % (ref 12.3–15.4)
GFR SERPL CREATININE-BSD FRML MDRD: 58 ML/MIN/1.73
GLUCOSE SERPL-MCNC: 102 MG/DL (ref 65–99)
HCT VFR BLD AUTO: 36.3 % (ref 37.5–51)
HGB BLD-MCNC: 11.6 G/DL (ref 13–17.7)
IMM GRANULOCYTES # BLD AUTO: 0.69 10*3/MM3 (ref 0–0.05)
IMM GRANULOCYTES NFR BLD AUTO: 4.9 % (ref 0–0.5)
LYMPHOCYTES # BLD AUTO: 1.63 10*3/MM3 (ref 0.7–3.1)
LYMPHOCYTES NFR BLD AUTO: 11.6 % (ref 19.6–45.3)
MCH RBC QN AUTO: 28.4 PG (ref 26.6–33)
MCHC RBC AUTO-ENTMCNC: 32 G/DL (ref 31.5–35.7)
MCV RBC AUTO: 89 FL (ref 79–97)
MONOCYTES # BLD AUTO: 1.3 10*3/MM3 (ref 0.1–0.9)
MONOCYTES NFR BLD AUTO: 9.3 % (ref 5–12)
NEUTROPHILS NFR BLD AUTO: 70.9 % (ref 42.7–76)
NEUTROPHILS NFR BLD AUTO: 9.93 10*3/MM3 (ref 1.7–7)
NRBC BLD AUTO-RTO: 0 /100 WBC (ref 0–0.2)
PLATELET # BLD AUTO: 379 10*3/MM3 (ref 140–450)
PMV BLD AUTO: 9.6 FL (ref 6–12)
POTASSIUM SERPL-SCNC: 4.2 MMOL/L (ref 3.5–5.2)
RBC # BLD AUTO: 4.08 10*6/MM3 (ref 4.14–5.8)
SODIUM SERPL-SCNC: 139 MMOL/L (ref 136–145)
WBC NRBC COR # BLD: 14.01 10*3/MM3 (ref 3.4–10.8)

## 2022-02-16 PROCEDURE — 80048 BASIC METABOLIC PNL TOTAL CA: CPT | Performed by: SPECIALIST

## 2022-02-16 PROCEDURE — 94799 UNLISTED PULMONARY SVC/PX: CPT

## 2022-02-16 PROCEDURE — 85025 COMPLETE CBC W/AUTO DIFF WBC: CPT | Performed by: SPECIALIST

## 2022-02-16 RX ORDER — HYDROCODONE BITARTRATE AND ACETAMINOPHEN 7.5; 325 MG/1; MG/1
1 TABLET ORAL EVERY 4 HOURS PRN
Qty: 30 TABLET | Refills: 0 | Status: SHIPPED | OUTPATIENT
Start: 2022-02-16

## 2022-02-16 RX ORDER — LEVOFLOXACIN 500 MG/1
500 TABLET, FILM COATED ORAL DAILY
Qty: 7 TABLET | Refills: 0 | Status: SHIPPED | OUTPATIENT
Start: 2022-02-16 | End: 2022-02-23

## 2022-02-16 RX ORDER — METRONIDAZOLE 500 MG/1
500 TABLET ORAL 3 TIMES DAILY
Qty: 21 TABLET | Refills: 0 | Status: SHIPPED | OUTPATIENT
Start: 2022-02-16 | End: 2022-02-23

## 2022-02-16 RX ADMIN — SODIUM CHLORIDE 100 ML/HR: 9 INJECTION, SOLUTION INTRAVENOUS at 05:28

## 2022-02-16 RX ADMIN — METRONIDAZOLE 500 MG: 500 TABLET ORAL at 05:28

## 2022-02-16 NOTE — DISCHARGE SUMMARY
Milly Valverde MD Discharge Summary    Date of Admission: 2/8/2022  Date of Discharge:  2/16/2022    Discharge Diagnosis: Acute perforated appendicitis with diffuse peritonitis, advanced age    Presenting Problem/History of Present Illness    History and Physical as outlined in the chart    Hospital Course  Patient was admitted and underwent the above operation.  He had an ileus postop but once this resolved his diet was advanced.  KANG drain was serous.  He had persistent elevation of his white count and some right-sided tenderness.  A CT scan was repeated on 2/14 which revealed some inflammatory changes in the right lower quadrant but no abscess.  Antibiotics were switched to Levaquin and Flagyl p.o.  White count remains 14,000 slightly down from yesterday and will be discharged home on the Levaquin and Flagyl as well as p.o. pain meds with follow-up in 6 days.  He is instructed to call or come back to the emergency room if fevers worsening pain or other symptoms develop patient will be discharged to home.  See medication reconciliation for medications at discharge.    Procedures Performed  Procedure(s):  APPENDECTOMY LAPAROSCOPIC       Consults:   Consults     Date and Time Order Name Status Description    2/9/2022  9:04 AM Inpatient Hospitalist Consult Completed           Condition on Discharge:  stable      Discharge Disposition  Home or Self Care    Discharge Medications     Discharge Medications      New Medications      Instructions Start Date   HYDROcodone-acetaminophen 7.5-325 MG per tablet  Commonly known as: NORCO   1 tablet, Oral, Every 4 Hours PRN      levoFLOXacin 500 MG tablet  Commonly known as: Levaquin   500 mg, Oral, Daily      metroNIDAZOLE 500 MG tablet  Commonly known as: Flagyl   500 mg, Oral, 3 Times Daily         Continue These Medications      Instructions Start Date   atenolol 50 MG tablet  Commonly known as: TENORMIN   atenolol 50 mg tablet   Take 1 tablet every day by oral route.       clopidogrel 75 MG tablet  Commonly known as: PLAVIX   clopidogrel 75 mg tablet   Take 1 tablet every day by oral route.      finasteride 5 MG tablet  Commonly known as: PROSCAR   finasteride 5 mg tablet   Take 1 tablet every day by oral route.      gabapentin 300 MG capsule  Commonly known as: NEURONTIN   300 mg, Every Night at Bedtime      lisinopril 10 MG tablet  Commonly known as: PRINIVIL,ZESTRIL   10 mg, Oral, Daily      pravastatin 40 MG tablet  Commonly known as: PRAVACHOL   pravastatin 40 mg tablet   Take 1 tablet every day by oral route.      Protonix 40 MG EC tablet  Generic drug: pantoprazole   Protonix 40 mg tablet,delayed release   Take 1 tablet every day by oral route at bedtime.             Discharge Diet:     Activity at Discharge:     Follow-up Appointments  No future appointments.  Additional Instructions for the Follow-ups that You Need to Schedule     Discharge Follow-up with Specified Provider: me; 6 Days   As directed      To: me    Follow Up: 6 Days               Test Results Pending at Discharge       Milly Valverde MD  02/16/22  07:50 CST    Time: Time spent at discharge 30 minutes

## 2022-02-16 NOTE — THERAPY DISCHARGE NOTE
Acute Care - Physical Therapy Discharge Summary  [facility]       Patient Name: Kj Cerrato  : 1932  MRN: [id number]    Today's Date: 2022                 Admit Date: 2022      PT Recommendation and Plan    Visit Dx:    ICD-10-CM ICD-9-CM   1. Acute appendicitis, unspecified acute appendicitis type  K35.80 540.9   2. Appendicitis  K37 541   3. Dysphagia, unspecified type  R13.10 787.20   4. Impaired mobility  Z74.09 799.89                PT Rehab Goals     Row Name 22 1343             Bed Mobility Goal 1 (PT)    Activity/Assistive Device (Bed Mobility Goal 1, PT) sit to supine; supine to sit  -AB      [place] Level/Cues Needed (Bed Mobility Goal 1, PT) independent  -AB      Time Frame (Bed Mobility Goal 1, PT) long term goal (LTG)  -AB      Progress/Outcomes (Bed Mobility Goal 1, PT) goal not met  -AB              Transfer Goal 1 (PT)    Activity/Assistive Device (Transfer Goal 1, PT) sit-to-stand/stand-to-sit; bed-to-chair/chair-to-bed  -AB      [place] Level/Cues Needed (Transfer Goal 1, PT) supervision required  -AB      Time Frame (Transfer Goal 1, PT) long term goal (LTG)  -AB      Progress/Outcome (Transfer Goal 1, PT) goal not met  -AB              Gait Training Goal 1 (PT)    Activity/Assistive Device (Gait Training Goal 1, PT) gait (walking locomotion); decrease fall risk; improve balance and speed; increase endurance/gait distance; walker, rolling  -AB      [place] Level (Gait Training Goal 1, PT) standby assist  -AB      Distance (Gait Training Goal 1, PT) 250  -AB      Time Frame (Gait Training Goal 1, PT) long term goal (LTG)  -AB      Progress/Outcome (Gait Training Goal 1, PT) goal partially met  -AB            User Key  (r) = Recorded By, (t) = Taken By, (c) = Cosigned By    Initials Name Provider Type Discipline    Che Biggs, PTA Physical Therapy Assistant PT                    PT Discharge Summary  Anticipated Discharge Disposition (PT): home with  assist  Reason for Discharge: Discharge from facility  Outcomes Achieved: Refer to plan of care for updates on goals achieved  Discharge Destination: Home with assist      Che Carbajal, PTA   2/16/2022

## 2022-02-16 NOTE — PROGRESS NOTES
[facility] Medicine Services  INPATIENT PROGRESS NOTE    Patient Name: Kj Cerrato  Date of Admission: 2/8/2022  Today's Date: 02/16/22  Length of Stay: 8  Primary Care Physician: Philippe Oliveira DO    Subjective   Chief Complaint: Follow-up  HPI   Patient doing well  Accompanied by significant other at bedside  Anticipating going home  Breathing better  Urinating well        Review of Systems     All pertinent negatives and positives are as above. All other systems have been reviewed and are negative unless otherwise stated.     Objective    Temp:  [97.6 °F (36.4 °C)-98.4 °F (36.9 °C)] 97.6 °F (36.4 °C)  Heart Rate:  [67-80] 71  Resp:  [16-20] 18  BP: (118-158)/(54-64) 158/63  Physical Exam  Pleasant man  No distress  He is in room air  No distress   Seated on recliner working on incentive spirometry when I came in.   GEN: Awake, alert, interactive, in NAD  HEENT: PERRLA, EOMI, Anicteric, Trachea midline  Lungs:  CTAB without wheezes or rales.  Heart: RRR, +S1/s2, no rub   ABD:  + bowel sounds, abdominal bandage in place  Extremities: atraumatic, no cyanosis, no pitting edema  Skin: no rashes or petechiae  Neuro: AAOx3, no focal deficits  Psych: normal mood & affect         Results Review:  I have reviewed the labs, radiology results, and diagnostic studies.    Laboratory Data:   Results from last 7 days   Lab Units 02/16/22  0504 02/15/22  0631 02/14/22  0905   WBC 10*3/mm3 14.01* 14.46* 14.05*   HEMOGLOBIN g/dL 11.6* 13.3 13.3   HEMATOCRIT % 36.3* 42.5 43.4   PLATELETS 10*3/mm3 379 450 443        Results from last 7 days   Lab Units 02/16/22  0504 02/15/22  0631 02/14/22  0708   SODIUM mmol/L 139 142 142   POTASSIUM mmol/L 4.2 4.3 3.9   CHLORIDE mmol/L 107 104 103   CO2 mmol/L 24.0 29.0 28.0   BUN mg/dL 22 22 27*   CREATININE mg/dL 1.19 1.37* 1.29*   CALCIUM mg/dL 7.8* 8.6 8.5*   GLUCOSE mg/dL 102* 103* 119*       Culture Data:   No results found for: BLOODCX, URINECX,  WOUNDCX, MRSACX, RESPCX, STOOLCX    Radiology Data:   Imaging Results (Last 24 Hours)     ** No results found for the last 24 hours. **          I have reviewed the patient's current medications.     Assessment/Plan     Active Hospital Problems    Diagnosis    • **Acute appendicitis    • Acute respiratory failure with hypoxia (HCC)    • Elevated serum creatinine    • Leukocytosis        · Status post lap abelardo for ruptured appendix.    · Acute hypoxic respiratory failure -suspected by Dr. Sandhu is flash pulmonary edema intraoperatively.E F with preserved ejection fraction and no overt diastolic dysfunction.  Now on room air. Encouraged to continue on incentive spirometry.  There may also be component of hypoventilated lungs.  · Acute renal insufficiency perioperatively -patient has poor oral intake.  · Leukocytosis in the setting of perforated appendix.   · History of hypertension-on atenolol; monitor blood pressure. Stable      Renal function improved  Oxygenation maintained in room air  Hemodynamically stable and appropriate for discharge  Instructed to follow closely with primary care provider in 1 week or as directed by Dr. Valverde  Follow-up with Dr. Valverde per his recommendation  Okay for for discharge from my standpoint              Electronically signed by Marco Cardenas MD, 02/16/22, 09:17 CST.

## 2022-02-16 NOTE — CASE MANAGEMENT/SOCIAL WORK
Continued Stay Note   [facility]     Patient Name: Kj Cerrato  MRN: [id number]  Today's Date: 2/16/2022    Admit Date: 2/8/2022     Discharge Plan     Row Name 02/16/22 1003       Plan    Plan Home    Patient/Family in Agreement with Plan yes    Final Discharge Disposition Code 01 - home or self-care    Final Note Pt is going home today. He is now on room air.               Discharge Codes    No documentation.               Expected Discharge Date and Time     Expected Discharge Date Expected Discharge Time    Feb 16, 2022             BHAVNA Hayes

## 2022-02-16 NOTE — PAYOR COMM NOTE
"Fabiola Reynolds (89 y.o. Male) [phone]  Additional clinical   Requesting cont stay review   Pt remains in [facility] phone    Fax               Date of Birth Social Security Number Address Home Phone MRN    09/17/1932  [address] [place] [address]  [id number]    [facility] Status             Other        Admission Date Admission Type Admitting Provider Attending Provider Department, Room/Bed    2/8/22 Emergency Milly Valverde MD Tyrrell, Dana R, MD [facility] 3C, 378/1    Discharge Date Discharge Disposition Discharge Destination                         Attending Provider: Milly Valverde MD    Allergies: No Known Allergies    Isolation: None   Infection: None   Code Status: No CPR   Advance Care Planning Activity    Ht: 177.8 cm (70\")   Wt: 80.1 kg (176 lb 9.4 oz)    Admission Cmt: None   Principal Problem: Acute appendicitis [K35.80]                 Active Insurance as of 2/8/2022     Primary Coverage     Payor Plan Insurance Group Employer/Plan Group    AETNA MEDICARE REPLACEMENT AETNA MEDICARE REPLACEMENT [id number]     Payor Plan Address Payor Plan Phone Number Payor Plan Fax Number Effective Dates    [address] [phone]  1/1/2018 - None Entered    [facility] [id number]       Subscriber Name Subscriber Birth Date Member ID       FABIOLA REYNOLDS 9/17/1932 [id number]                 Emergency Contacts      (Rel.) Home Phone Work Phone Mobile Phone    rosario escobar (Daughter) -- [phone]              Current Facility-Administered Medications   Medication Dose Route Frequency Provider Last Rate Last Admin   • acetaminophen (TYLENOL) tablet 1,000 mg  1,000 mg Oral Q8H PRN Shanthi Vora MD       • atenolol (TENORMIN) tablet 50 mg  50 mg Oral Q24H Shanthi Vora MD   50 mg at 02/15/22 0849   • enoxaparin (LOVENOX) syringe 30 mg  30 mg Subcutaneous Daily Abram" Milly ZAMBRANO MD   30 mg at 02/15/22 0853   • finasteride (PROSCAR) tablet 5 mg  5 mg Oral Daily Shanthi Vora MD   5 mg at 02/15/22 0851   • gabapentin (NEURONTIN) capsule 300 mg  300 mg Oral Nightly Shanthi Vora MD   300 mg at 02/15/22 2032   • levoFLOXacin (LEVAQUIN) tablet 500 mg  500 mg Oral Q24H Milly Valverde MD   500 mg at 02/15/22 1039   • metroNIDAZOLE (FLAGYL) tablet 500 mg  500 mg Oral Q8H Milly Valverde MD   500 mg at 02/16/22 0528   • ondansetron (ZOFRAN) injection 4 mg  4 mg Intravenous Q6H PRN Milly Valverde MD   4 mg at 02/09/22 1531   • oxyCODONE (ROXICODONE) immediate release tablet 5 mg  5 mg Oral Q6H PRN Shanthi Vora MD   5 mg at 02/14/22 2031    And   • oxyCODONE (ROXICODONE) immediate release tablet 5 mg  5 mg Oral Q6H PRN Shanthi Vora MD       • sodium chloride 0.9 % infusion  100 mL/hr Intravenous Continuous Marco Cardenas  mL/hr at 02/16/22 0528 100 mL/hr at 02/16/22 0528   • traMADol (ULTRAM) tablet 25 mg  25 mg Oral Q6H PRN Shanthi Vora MD            Physician Progress Notes (last 48 hours)      Marco Cardenas MD at 02/15/22 1125          1           [facility] Medicine Services  INPATIENT PROGRESS NOTE    Patient Name: Kj Cerrato  Date of Admission: 2/8/2022  Today's Date: 02/15/22  Length of Stay: 7  Primary Care Physician: Philippe Oliveira DO    Subjective   Chief Complaint: Follow-up  HPI   Patient has poor oral intake as documented in input and output.  Noted slow increased in creatinine    Patient maintaining O2 in room air anywhere from 90 to 94%. Patient encouraged to continue using incentive spirometry    Noted as well patient has been switched to oral antibiotic by primary service.    States urinating well  Expressed that he was reaching 1500 Jarod on incentive spirometry.  He also mentioned that the drain was removed yesterday.  Review of Systems     All pertinent negatives  and positives are as above. All other systems have been reviewed and are negative unless otherwise stated.     Objective    Temp:  [98.2 °F (36.8 °C)-98.6 °F (37 °C)] 98.4 °F (36.9 °C)  Heart Rate:  [66-79] 79  Resp:  [16-18] 16  BP: (125-150)/(60-68) 125/66  Physical Exam  Pleasant man  No distress  He is in room air  No distress   Seated on recliner working on incentive spirometry when I came in.  He was glad to tell me that he was reaching 1500 and it was hard work.  GEN: Awake, alert, interactive, in NAD  HEENT: PERRLA, EOMI, Anicteric, Trachea midline  Lungs:  CTAB without wheezes or rales.  Heart: RRR, +S1/s2, no rub   ABD:  + bowel sounds, abdominal bandage in place, drain with minimal serosanguineous fluid.  Extremities: atraumatic, no cyanosis, no pitting edema  Skin: no rashes or petechiae  Neuro: AAOx3, no focal deficits  Psych: normal mood & affect      Results Review:  I have reviewed the labs, radiology results, and diagnostic studies.    Laboratory Data:   Results from last 7 days   Lab Units 02/15/22  0631 02/14/22  0905 02/13/22  0731   WBC 10*3/mm3 14.46* 14.05* 11.41*   HEMOGLOBIN g/dL 13.3 13.3 12.7*   HEMATOCRIT % 42.5 43.4 41.2   PLATELETS 10*3/mm3 450 443 385        Results from last 7 days   Lab Units 02/15/22  0631 02/14/22  0708 02/13/22  0731 02/09/22  0830 02/08/22  1955   SODIUM mmol/L 142 142 144   < > 142   POTASSIUM mmol/L 4.3 3.9 4.0   < > 4.7   CHLORIDE mmol/L 104 103 104   < > 104   CO2 mmol/L 29.0 28.0 32.0*   < > 22.0   BUN mg/dL 22 27* 33*   < > 33*   CREATININE mg/dL 1.37* 1.29* 1.26   < > 2.19*   CALCIUM mg/dL 8.6 8.5* 9.2   < > 9.8   BILIRUBIN mg/dL  --   --   --   --  1.6*   ALK PHOS U/L  --   --   --   --  39   ALT (SGPT) U/L  --   --   --   --  13   AST (SGOT) U/L  --   --   --   --  15   GLUCOSE mg/dL 103* 119* 105*   < > 135*    < > = values in this interval not displayed.       Culture Data:   Blood Culture   Date Value Ref Range Status   02/08/2022 No growth at 5 days   Final   02/08/2022 No growth at 5 days  Final       Radiology Data:   Imaging Results (Last 24 Hours)     Procedure Component Value Units Date/Time    CT Abdomen Pelvis With Contrast [[id number]] Collected: 02/14/22 1249     Updated: 02/14/22 1259    Narrative:      CT ABDOMEN PELVIS W CONTRAST- 2/14/2022 11:12 AM CST     HISTORY: Abdominal pain, fever, post-op; K35.80-Unspecified acute  appendicitis; K37-Unspecified appendicitis; R13.10-Dysphagia,  unspecified; Z74.09-Other reduced mobility, appendectomy on 2/8/2022     COMPARISON: 2/8/2022     DOSE LENGTH PRODUCT: 320 mGy cm. Automated exposure control was also  utilized to decrease patient radiation dose.     TECHNIQUE: Axial images the abdomen pelvis are obtained following IV  contrast. No oral contrast administered.     FINDINGS:  There is no enhancing liver mass. 10 mm hypodensity along the  anterior inferior right hepatic lobe may represent perihepatic  inflammation/fluid. The spleen, pancreas, gallbladder, and adrenal  glands are unremarkable. There are bilateral renal cysts. No enhancing  renal mass identified. Nonobstructing inferior left intrarenal stone  suspected. No hydronephrosis. Air within the bladder may relate to  recent Sánchez catheterization. Coarse prostate calcification.     Small volume of free fluid within the lower pelvis likely reactive.  Right lower quadrant surgical drain. No discrete loculated abscess  collection. No inflammatory changes within the right mesentery. Moderate  stool of the colon. Sigmoid colonic diverticulosis. No small bowel  dilatation. Diffuse vascular calcification with no regional aneurysm.  Dense right greater than left proximal renal artery calcification. Fatty  containing right inguinal hernia. No pathologic lymphadenopathy.     Patchy bibasilar atelectasis suspected.     No focal destructive osseous lesions.       Impression:      1. Postoperative changes from recent appendectomy. Surgical drain  remains within the  right lower quadrant. Inflammatory changes of the  mesentery with no drainable loculated abscess collection. Small volume  reactive free fluid within the lower pelvis.  2. Patchy bibasilar atelectasis.  3. Nonobstructing left intrarenal stones. Bilateral renal cysts. No  hydronephrosis.  4. Moderately dense diffuse vascular calcification with no regional  aneurysm.  This report was finalized on 02/14/2022 12:56 by Dr. Edilma Ramirez MD.          I have reviewed the patient's current medications.     Assessment/Plan     Active Hospital Problems    Diagnosis    • **Acute appendicitis    • Acute respiratory failure with hypoxia (HCC)    • Elevated serum creatinine    • Leukocytosis        · Status post lap abelardo for ruptured appendix.  Drain in place.  Postoperative follow-up per primary service.  On antibiotic  · Acute hypoxic respiratory failure -suspected by Dr. Sandhu is flash pulmonary edema intraoperatively.E F with preserved ejection fraction and no overt diastolic dysfunction.  Now on room air. Encouraged to continue on incentive spirometry.  There may also be component of hypoventilated lungs.  · Acute renal insufficiency perioperatively -patient has poor oral intake. Also had IV contrast exam today. Slightly increase in creatinine compared couple of days ago. Will support with IV fluid while in the hospital and monitor renal function, fluid status.  · Leukocytosis in the setting of perforated appendix. Patient been switched by primary service to levofloxacin and metronidazole.  · History of hypertension-on atenolol; monitor blood pressure. Stable     Other plans per primary service  atenolol, 50 mg, Oral, Q24H  enoxaparin, 30 mg, Subcutaneous, Daily  finasteride, 5 mg, Oral, Daily  gabapentin, 300 mg, Oral, Nightly  levoFLOXacin, 500 mg, Oral, Q24H  metroNIDAZOLE, 500 mg, Oral, Q8H    Discussed with nurse Bunn                Discharge Planning:tbd      Electronically signed by Marco Cardenas MD,  02/15/22, 11:26 CST.      Electronically signed by Marco Cardenas MD at 02/15/22 1217     Milly Valverde MD at 02/15/22 0816        CT scan reviewed.  No abscess noted.  KANG drain removed.  His white count is slightly up today.  We will switch him to oral Levaquin and Flagyl as there is still a fair amount of inflammatory changes in the right lower quadrant seen on CT scan.  His exam is slightly improved with his tenderness over there.  We will continue to monitor daily white count tomorrow.  Hopefully this will trend down he can go home on oral antibiotics    Electronically signed by Milly Valverde MD at 02/15/22 0816     Marco Cardenas MD at 02/14/22 1340          1           [facility] Medicine Services  INPATIENT PROGRESS NOTE    Patient Name: Kj Cerrato  Date of Admission: 2/8/2022  Today's Date: 02/14/22  Length of Stay: 6  Primary Care Physician: Philippe Oliveira DO    Subjective   Chief Complaint: Follow-up  HPI     Hospitalist service consulted on February 9 for acute hypoxic respiratory failure.  It is suspected that it was secondary to flash pulmonary edema intraoperatively.  Patient did well on Lasix.    EF is normal at 51 to 55%    Discussed with nurse Sepideh  Patient reportedly doing well  Reviewed PT note.  Patient reportedly had O2 in the mid 80s while at rest.  Patient was placed on oxygen.  So far patient is back on room air with saturation anywhere from 92 and 93%    Renal function improved  Oxygenation improved  Patient has negative fluid balance of about 600 mL the past couple of days.      Patient is doing a lot better.  He is seated around the recliner  He is anticipating that he will be going home tomorrow.  Significant other is at bedside    Review of Systems     All pertinent negatives and positives are as above. All other systems have been reviewed and are negative unless otherwise stated.     Objective    Temp:  [98.2 °F (36.8  °C)-98.5 °F (36.9 °C)] 98.4 °F (36.9 °C)  Heart Rate:  [66-84] 68  Resp:  [16] 16  BP: (127-142)/(62-77) 131/68  Physical Exam  Pleasant man  No distress  He is in room air  No distress   Seated on recliner having supper  GEN: Awake, alert, interactive, in NAD  HEENT: PERRLA, EOMI, Anicteric, Trachea midline  Lungs:  CTAB without wheezes or rales.  Heart: RRR, +S1/s2, no rub   ABD:  + bowel sounds, abdominal bandage in place, drain with minimal serosanguineous fluid.  Extremities: atraumatic, no cyanosis, no pitting edema  Skin: no rashes or petechiae  Neuro: AAOx3, no focal deficits  Psych: normal mood & affect      Results Review:  I have reviewed the labs, radiology results, and diagnostic studies.    Laboratory Data:   Results from last 7 days   Lab Units 02/14/22  0905 02/13/22  0731 02/12/22  0712   WBC 10*3/mm3 14.05* 11.41* 12.50*   HEMOGLOBIN g/dL 13.3 12.7* 12.4*   HEMATOCRIT % 43.4 41.2 40.1   PLATELETS 10*3/mm3 443 385 380        Results from last 7 days   Lab Units 02/14/22  0708 02/13/22  0731 02/12/22  0712 02/09/22  0830 02/08/22  1955   SODIUM mmol/L 142 144 141   < > 142   POTASSIUM mmol/L 3.9 4.0 4.4   < > 4.7   CHLORIDE mmol/L 103 104 103   < > 104   CO2 mmol/L 28.0 32.0* 29.0   < > 22.0   BUN mg/dL 27* 33* 45*   < > 33*   CREATININE mg/dL 1.29* 1.26 1.46*   < > 2.19*   CALCIUM mg/dL 8.5* 9.2 9.3   < > 9.8   BILIRUBIN mg/dL  --   --   --   --  1.6*   ALK PHOS U/L  --   --   --   --  39   ALT (SGPT) U/L  --   --   --   --  13   AST (SGOT) U/L  --   --   --   --  15   GLUCOSE mg/dL 119* 105* 101*   < > 135*    < > = values in this interval not displayed.       Culture Data:   Blood Culture   Date Value Ref Range Status   02/08/2022 No growth at 5 days  Final   02/08/2022 No growth at 5 days  Final       Radiology Data:   Imaging Results (Last 24 Hours)     Procedure Component Value Units Date/Time    CT Abdomen Pelvis With Contrast [[id number]] Collected: 02/14/22 1249     Updated: 02/14/22 1254     Narrative:      CT ABDOMEN PELVIS W CONTRAST- 2/14/2022 11:12 AM CST     HISTORY: Abdominal pain, fever, post-op; K35.80-Unspecified acute  appendicitis; K37-Unspecified appendicitis; R13.10-Dysphagia,  unspecified; Z74.09-Other reduced mobility, appendectomy on 2/8/2022     COMPARISON: 2/8/2022     DOSE LENGTH PRODUCT: 320 mGy cm. Automated exposure control was also  utilized to decrease patient radiation dose.     TECHNIQUE: Axial images the abdomen pelvis are obtained following IV  contrast. No oral contrast administered.     FINDINGS:  There is no enhancing liver mass. 10 mm hypodensity along the  anterior inferior right hepatic lobe may represent perihepatic  inflammation/fluid. The spleen, pancreas, gallbladder, and adrenal  glands are unremarkable. There are bilateral renal cysts. No enhancing  renal mass identified. Nonobstructing inferior left intrarenal stone  suspected. No hydronephrosis. Air within the bladder may relate to  recent Sánchez catheterization. Coarse prostate calcification.     Small volume of free fluid within the lower pelvis likely reactive.  Right lower quadrant surgical drain. No discrete loculated abscess  collection. No inflammatory changes within the right mesentery. Moderate  stool of the colon. Sigmoid colonic diverticulosis. No small bowel  dilatation. Diffuse vascular calcification with no regional aneurysm.  Dense right greater than left proximal renal artery calcification. Fatty  containing right inguinal hernia. No pathologic lymphadenopathy.     Patchy bibasilar atelectasis suspected.     No focal destructive osseous lesions.       Impression:      1. Postoperative changes from recent appendectomy. Surgical drain  remains within the right lower quadrant. Inflammatory changes of the  mesentery with no drainable loculated abscess collection. Small volume  reactive free fluid within the lower pelvis.  2. Patchy bibasilar atelectasis.  3. Nonobstructing left intrarenal stones.  Bilateral renal cysts. No  hydronephrosis.  4. Moderately dense diffuse vascular calcification with no regional  aneurysm.  This report was finalized on 02/14/2022 12:56 by Dr. Edilma Ramirez MD.          I have reviewed the patient's current medications.     Assessment/Plan     Active Hospital Problems    Diagnosis    • **Acute appendicitis    • Acute respiratory failure with hypoxia (HCC)    • Elevated serum creatinine    • Leukocytosis      · Status post lap abelardo for ruptured appendix.  Drain in place.  Postoperative follow-up per primary service.  On antibiotic  · Acute hypoxic respiratory failure -suspected by Dr. Sandhu is flash pulmonary edema intraoperatively.E F with preserved ejection fraction and no overt diastolic dysfunction.  Now on 2 L nasal cannula.  Encouraged to continue on incentive spirometry.  There may also be component of hypoventilated lungs.  · Acute renal insufficiency perioperatively -back to normal  · Leukocytosis in the setting of perforated appendix.  Currently on Zosyn per primary service  · History of hypertension-on atenolol; monitor blood pressure     Doing better from my standpoint  Continue present management per primary service  discusssed with nurse Sepideh Kebede incentive spirometry      atenolol, 50 mg, Oral, Q24H  enoxaparin, 30 mg, Subcutaneous, Daily  finasteride, 5 mg, Oral, Daily  gabapentin, 300 mg, Oral, Nightly  piperacillin-tazobactam, 2.25 g, Intravenous, Q8H            Discharge Planning: Per primary service    Electronically signed by Marco Cardenas MD, 02/14/22, 17:30 CST.      Electronically signed by Marco Cardenas MD at 02/14/22 1811     Milly Valverde MD at 02/14/22 1015          Milly Valverde MD Progress Note     LOS: 6 days   Patient Care Team:  Philippe Oliveira,  as PCP - General (Family Medicine)        Subjective     Doing well.  Bowel movements are becoming more solid.  Tolerating regular diet.    Objective     Vital  Signs  Temp:  [98.2 °F (36.8 °C)-98.5 °F (36.9 °C)] 98.5 °F (36.9 °C)  Heart Rate:  [66-84] 84  Resp:  [16] 16  BP: (127-142)/(62-77) 142/77    Intake & Output (last 3 days)       02/11 0701  02/12 0700 02/12 0701  02/13 0700 02/13 0701  02/14 0700 02/14 0701  02/15 0700    P.O.  260 480     I.V. (mL/kg)  115.8 (1.4)      IV Piggyback  100 100     Total Intake(mL/kg)  475.8 (5.9) 580 (7.2)     Urine (mL/kg/hr) 1700 (0.9) 750 (0.4) 875 (0.5)     Drains 175 25 40 40    Stool  0      Total Output 1875 775 915 40    Net -1875 -299.2 -335 -40            Urine Unmeasured Occurrence   3 x     Stool Unmeasured Occurrence  4 x            Physical Exam:     General Appearance:    Alert, cooperative, in no acute distress   Lungs:     respirations regular, even and unlabored    Heart:    Regular rhythm and normal rate, normal S1 and S2, no            murmur, no gallop, no rub   Chest Wall:    No abnormalities observed   Abdomen:      Still little full especially on the right side.  KANG is serous   Extremities: No edema,    Results Review:     I reviewed the patient's new clinical results.    Lab Results (last 72 hours)     Procedure Component Value Units Date/Time    CBC Auto Differential [[id number]]  (Abnormal) Collected: 02/14/22 0905    Specimen: Blood Updated: 02/14/22 0933     WBC 14.05 10*3/mm3      RBC 4.84 10*6/mm3      Hemoglobin 13.3 g/dL      Hematocrit 43.4 %      MCV 89.7 fL      MCH 27.5 pg      MCHC 30.6 g/dL      RDW 13.0 %      RDW-SD 42.6 fl      MPV 9.8 fL      Platelets 443 10*3/mm3      nRBC 0.0 /100 WBC     Manual Differential [[id number]] Collected: 02/14/22 0905    Specimen: Blood Updated: 02/14/22 0920    CBC & Differential [[id number]]  (Abnormal) Collected: 02/14/22 0905    Specimen: Blood Updated: 02/14/22 0919    Narrative:      The following orders were created for panel order CBC & Differential.  Procedure                               Abnormality         Status                     ---------                                -----------         ------                     CBC Auto Differential[[id number]]        Abnormal            Preliminary result           Please view results for these tests on the individual orders.    Basic Metabolic Panel [[id number]]  (Abnormal) Collected: 02/14/22 0708    Specimen: Blood Updated: 02/14/22 0751     Glucose 119 mg/dL      BUN 27 mg/dL      Creatinine 1.29 mg/dL      Sodium 142 mmol/L      Potassium 3.9 mmol/L      Chloride 103 mmol/L      CO2 28.0 mmol/L      Calcium 8.5 mg/dL      eGFR Non African Amer 52 mL/min/1.73      BUN/Creatinine Ratio 20.9     Anion Gap 11.0 mmol/L     Narrative:      GFR Normal >60  Chronic Kidney Disease <60  Kidney Failure <15      Blood Culture - Blood, Arm, Right [[id number]]  (Normal) Collected: 02/08/22 2145    Specimen: Blood from Arm, Right Updated: 02/13/22 2215     Blood Culture No growth at 5 days    Blood Culture - Blood, Arm, Right [[id number]]  (Normal) Collected: 02/08/22 2155    Specimen: Blood from Arm, Right Updated: 02/13/22 2215     Blood Culture No growth at 5 days    Basic Metabolic Panel [[id number]]  (Abnormal) Collected: 02/13/22 0731    Specimen: Blood Updated: 02/13/22 0824     Glucose 105 mg/dL      BUN 33 mg/dL      Creatinine 1.26 mg/dL      Sodium 144 mmol/L      Potassium 4.0 mmol/L      Chloride 104 mmol/L      CO2 32.0 mmol/L      Calcium 9.2 mg/dL      eGFR Non African Amer 54 mL/min/1.73      BUN/Creatinine Ratio 26.2     Anion Gap 8.0 mmol/L     Narrative:      GFR Normal >60  Chronic Kidney Disease <60  Kidney Failure <15      Manual Differential [[id number]]  (Abnormal) Collected: 02/13/22 0731    Specimen: Blood Updated: 02/13/22 0823     Neutrophil % 71.0 %      Lymphocyte % 10.0 %      Monocyte % 14.0 %      Eosinophil % 3.0 %      Basophil % 1.0 %      Atypical Lymphocyte % 1.0 %      Neutrophils Absolute 8.10 10*3/mm3      Lymphocytes Absolute 1.26 10*3/mm3      Monocytes Absolute 1.60 10*3/mm3      Eosinophils  Absolute 0.34 10*3/mm3      Basophils Absolute 0.11 10*3/mm3      RBC Morphology Normal     WBC Morphology Normal     Platelet Morphology Normal    CBC & Differential [[id number]]  (Abnormal) Collected: 02/13/22 0731    Specimen: Blood Updated: 02/13/22 0823    Narrative:      The following orders were created for panel order CBC & Differential.  Procedure                               Abnormality         Status                     ---------                               -----------         ------                     CBC Auto Differential[[id number]]        Abnormal            Final result                 Please view results for these tests on the individual orders.    CBC Auto Differential [[id number]]  (Abnormal) Collected: 02/13/22 0731    Specimen: Blood Updated: 02/13/22 0823     WBC 11.41 10*3/mm3      RBC 4.54 10*6/mm3      Hemoglobin 12.7 g/dL      Hematocrit 41.2 %      MCV 90.7 fL      MCH 28.0 pg      MCHC 30.8 g/dL      RDW 13.0 %      RDW-SD 42.9 fl      MPV 9.8 fL      Platelets 385 10*3/mm3     Basic Metabolic Panel [[id number]]  (Abnormal) Collected: 02/12/22 0712    Specimen: Blood Updated: 02/12/22 0814     Glucose 101 mg/dL      BUN 45 mg/dL      Creatinine 1.46 mg/dL      Sodium 141 mmol/L      Potassium 4.4 mmol/L      Chloride 103 mmol/L      CO2 29.0 mmol/L      Calcium 9.3 mg/dL      eGFR Non African Amer 45 mL/min/1.73      BUN/Creatinine Ratio 30.8     Anion Gap 9.0 mmol/L     Narrative:      GFR Normal >60  Chronic Kidney Disease <60  Kidney Failure <15      Magnesium [[id number]]  (Normal) Collected: 02/12/22 0712    Specimen: Blood Updated: 02/12/22 0814     Magnesium 2.3 mg/dL     CBC & Differential [[id number]]  (Abnormal) Collected: 02/12/22 0712    Specimen: Blood Updated: 02/12/22 0748    Narrative:      The following orders were created for panel order CBC & Differential.  Procedure                               Abnormality         Status                     ---------                                -----------         ------                     CBC Auto Differential[[id number]]        Abnormal            Final result                 Please view results for these tests on the individual orders.    CBC Auto Differential [[id number]]  (Abnormal) Collected: 02/12/22 0712    Specimen: Blood Updated: 02/12/22 0748     WBC 12.50 10*3/mm3      RBC 4.44 10*6/mm3      Hemoglobin 12.4 g/dL      Hematocrit 40.1 %      MCV 90.3 fL      MCH 27.9 pg      MCHC 30.9 g/dL      RDW 12.9 %      RDW-SD 42.5 fl      MPV 10.1 fL      Platelets 380 10*3/mm3      Neutrophil % 79.5 %      Lymphocyte % 7.7 %      Monocyte % 9.9 %      Eosinophil % 1.9 %      Basophil % 0.3 %      Immature Grans % 0.7 %      Neutrophils, Absolute 9.93 10*3/mm3      Lymphocytes, Absolute 0.96 10*3/mm3      Monocytes, Absolute 1.24 10*3/mm3      Eosinophils, Absolute 0.24 10*3/mm3      Basophils, Absolute 0.04 10*3/mm3      Immature Grans, Absolute 0.09 10*3/mm3      nRBC 0.0 /100 WBC         Imaging Results (Last 72 Hours)     ** No results found for the last 72 hours. **              Assessment/Plan       Acute appendicitis    Acute respiratory failure with hypoxia (HCC)    Elevated serum creatinine    Leukocytosis      We will continue IV antibiotics 1 more day.  DC KANG drain.  If white count remains slightly elevated may repeat CT scan      Milly Valverde MD  02/14/22  10:15 CST        Electronically signed by Milly Valverde MD at 02/14/22 1016          2/14  On room air  o2 sat 88%   Placed o2  2 lit  Sat 94 %  2/15  Sat 90 and 91 % on room air  Placed o2 1.5 lit per n/c       ivfl 100 hr

## 2022-02-16 NOTE — PLAN OF CARE
Goal Outcome Evaluation:  Plan of Care Reviewed With: patient        Progress: improving  Outcome Summary: IVF infusing. Up with assit. Lap x 3 with g/t. Tolerating PO abx. VSS.

## 2022-02-16 NOTE — PAYOR COMM NOTE
"Ref:  [id number]    [facility]  SHARON,  [phone]  OR  FAX  [phone]     Fabiola Reynolds (89 y.o. Male)             Date of Birth Social Security Number Address Home Phone MRN    09/17/1932  [address] [place] [address]  [id number]    [employer] Status             Other        Admission Date Admission Type Admitting Provider Attending Provider Department, Room/Bed    2/8/22 Emergency Milly Valverde MD  [facility] 3C, 378/1    Discharge Date Discharge Disposition Discharge Destination          2/16/2022 Home or Self Care              Attending Provider: (none)   Allergies: No Known Allergies    Isolation: None   Infection: None   Code Status: No CPR   Advance Care Planning Activity    Ht: 177.8 cm (70\")   Wt: 80.1 kg (176 lb 9.4 oz)    Admission Cmt: None   Principal Problem: Acute appendicitis [K35.80]                 Active Insurance as of 2/8/2022     Primary Coverage     Payor Plan Insurance Group Employer/Plan Group    AETNA MEDICARE REPLACEMENT AETNA MEDICARE REPLACEMENT [id number]     Payor Plan Address Payor Plan Phone Number Payor Plan Fax Number Effective Dates    [address] [phone]  1/1/2018 - None Entered    [facility] [id number]       Subscriber Name Subscriber Birth Date Member ID       FABIOLA REYNOLDS 9/17/1932 [id number]                 Emergency Contacts      (Rel.) Home Phone Work Phone Mobile Phone    rosario escobar (Daughter) -- [phone]               Discharge Summary      Milly Valverde MD at 02/16/22 0749          Milly Valverde MD Discharge Summary    Date of Admission: 2/8/2022  Date of Discharge:  2/16/2022    Discharge Diagnosis: Acute perforated appendicitis with diffuse peritonitis, advanced age    Presenting Problem/History of Present Illness    History and Physical as outlined in the chart    Hospital Course  Patient was admitted and underwent the above operation.  He had an ileus postop " but once this resolved his diet was advanced.  KANG drain was serous.  He had persistent elevation of his white count and some right-sided tenderness.  A CT scan was repeated on 2/14 which revealed some inflammatory changes in the right lower quadrant but no abscess.  Antibiotics were switched to Levaquin and Flagyl p.o.  White count remains 14,000 slightly down from yesterday and will be discharged home on the Levaquin and Flagyl as well as p.o. pain meds with follow-up in 6 days.  He is instructed to call or come back to the emergency room if fevers worsening pain or other symptoms develop patient will be discharged to home.  See medication reconciliation for medications at discharge.    Procedures Performed  Procedure(s):  APPENDECTOMY LAPAROSCOPIC       Consults:   Consults     Date and Time Order Name Status Description    2/9/2022  9:04 AM Inpatient Hospitalist Consult Completed           Condition on Discharge:  stable      Discharge Disposition  Home or Self Care    Discharge Medications     Discharge Medications      New Medications      Instructions Start Date   HYDROcodone-acetaminophen 7.5-325 MG per tablet  Commonly known as: NORCO   1 tablet, Oral, Every 4 Hours PRN      levoFLOXacin 500 MG tablet  Commonly known as: Levaquin   500 mg, Oral, Daily      metroNIDAZOLE 500 MG tablet  Commonly known as: Flagyl   500 mg, Oral, 3 Times Daily         Continue These Medications      Instructions Start Date   atenolol 50 MG tablet  Commonly known as: TENORMIN   atenolol 50 mg tablet   Take 1 tablet every day by oral route.      clopidogrel 75 MG tablet  Commonly known as: PLAVIX   clopidogrel 75 mg tablet   Take 1 tablet every day by oral route.      finasteride 5 MG tablet  Commonly known as: PROSCAR   finasteride 5 mg tablet   Take 1 tablet every day by oral route.      gabapentin 300 MG capsule  Commonly known as: NEURONTIN   300 mg, Every Night at Bedtime      lisinopril 10 MG tablet  Commonly known as:  PRINIVIL,ZESTRIL   10 mg, Oral, Daily      pravastatin 40 MG tablet  Commonly known as: PRAVACHOL   pravastatin 40 mg tablet   Take 1 tablet every day by oral route.      Protonix 40 MG EC tablet  Generic drug: pantoprazole   Protonix 40 mg tablet,delayed release   Take 1 tablet every day by oral route at bedtime.             Discharge Diet:     Activity at Discharge:     Follow-up Appointments  No future appointments.  Additional Instructions for the Follow-ups that You Need to Schedule     Discharge Follow-up with Specified Provider: me; 6 Days   As directed      To: me    Follow Up: 6 Days               Test Results Pending at Discharge       Milly Piper MD  02/16/22  07:50 CST    Time: Time spent at discharge 30 minutes             Electronically signed by Milly Piper MD at 02/16/22 0752       Discharge Order (From admission, onward)     Start     Ordered    02/16/22 0747  Discharge patient  Once        Expected Discharge Date: 02/16/22    Discharge Disposition: Home or Self Care    Physician of Record for Attribution - Please select from Treatment Team: MILLY PIPER [[id number]]    Review needed by CMO to determine Physician of Record: No       Question Answer Comment   Physician of Record for Attribution - Please select from Treatment Team MILLY PIPER    Review needed by CMO to determine Physician of Record No        02/16/22 0749

## 2022-02-17 NOTE — OUTREACH NOTE
Prep Survey      Responses   [facility] facility patient discharged from? [place]   Is LACE score < 7 ? No   Emergency Room discharge w/ pulse ox? No   Eligibility Readm Mgmt   Discharge diagnosis Acute appendicitis   Does the patient have one of the following disease processes/diagnoses(primary or secondary)? General Surgery   Does the patient have Home health ordered? No   Is there a DME ordered? No   Comments regarding appointments Go to Milly Valverde MD on 02/22/22 @ 0945 and Philippe Oliveira DO 02/23/22 on 0900   Prep survey completed? Yes          Nataliia Robledo RN

## 2022-02-18 ENCOUNTER — READMISSION MANAGEMENT (OUTPATIENT)
Dept: CALL CENTER | Facility: HOSPITAL | Age: 87
End: 2022-02-18

## 2022-02-18 NOTE — OUTREACH NOTE
General Surgery Week 1 Survey      Responses   [facility] patient discharged from? [place]   Does the patient have one of the following disease processes/diagnoses(primary or secondary)? General Surgery   Week 1 attempt successful? Yes   Call start time 1006   Call end time 1010   Discharge diagnosis Acute appendicitis   Is patient permission given to speak with other caregiver? No   Meds reviewed with patient/caregiver? Yes   Does the patient have all medications related to this admission filled (includes all antibiotics, pain medications, etc.) Yes   Is the patient taking all medications as directed (includes completed medication regime)? Yes   Does the patient have a follow up appointment scheduled with their surgeon? Yes  [2/22/22]   Has the patient kept scheduled appointments due by today? N/A   Comments PCP 2/23/22   Has home health visited the patient within 72 hours of discharge? N/A   Psychosocial issues? No   Did the patient receive a copy of their discharge instructions? Yes   Nursing interventions Reviewed instructions with patient   What is the patient's perception of their health status since discharge? Improving   Nursing interventions Nurse provided patient education   Is the patient /caregiver able to teach back basic post-op care? Practice 'cough and deep breath',  Keep incision areas clean,dry and protected   Is the patient/caregiver able to teach back signs and symptoms of incisional infection? Fever,  Increased drainage or bleeding,  Increased redness, swelling or pain at the incisonal site   Is the patient/caregiver able to teach back steps to recovery at home? Set small, achievable goals for return to baseline health,  Rest and rebuild strength, gradually increase activity,  Eat a well-balance diet   If the patient is a current smoker, are they able to teach back resources for cessation? Not a smoker   Is the patient/caregiver able to teach back the hierarchy of who to call/visit for  symptoms/problems? PCP, Specialist, Home health nurse, Urgent Care, ED, 911 Yes   Week 1 call completed? Yes          Lucrecia Reddy RN

## 2022-02-28 ENCOUNTER — READMISSION MANAGEMENT (OUTPATIENT)
Dept: CALL CENTER | Facility: HOSPITAL | Age: 87
End: 2022-02-28

## 2022-03-04 ENCOUNTER — HOSPITAL ENCOUNTER (EMERGENCY)
Facility: HOSPITAL | Age: 87
Discharge: HOME | End: 2022-03-04
Attending: FAMILY MEDICINE | Admitting: FAMILY MEDICINE

## 2022-03-04 ENCOUNTER — APPOINTMENT (OUTPATIENT)
Dept: CT IMAGING | Facility: HOSPITAL | Age: 87
End: 2022-03-04

## 2022-03-04 ENCOUNTER — APPOINTMENT (OUTPATIENT)
Dept: GENERAL RADIOLOGY | Facility: HOSPITAL | Age: 87
End: 2022-03-04

## 2022-03-04 VITALS
TEMPERATURE: 98 F | DIASTOLIC BLOOD PRESSURE: 61 MMHG | OXYGEN SATURATION: 94 % | BODY MASS INDEX: 25.11 KG/M2 | HEIGHT: 67 IN | WEIGHT: 160 LBS | SYSTOLIC BLOOD PRESSURE: 98 MMHG | HEART RATE: 88 BPM | RESPIRATION RATE: 18 BRPM

## 2022-03-04 DIAGNOSIS — N39.0 URINARY TRACT INFECTION WITHOUT HEMATURIA, SITE UNSPECIFIED: Primary | ICD-10-CM

## 2022-03-04 DIAGNOSIS — K52.9 PROCTOCOLITIS: ICD-10-CM

## 2022-03-04 LAB
ALBUMIN SERPL-MCNC: 3.2 G/DL (ref 3.5–5.2)
ALBUMIN SERPL-MCNC: 3.8 G/DL (ref 3.5–5.2)
ALBUMIN/GLOB SERPL: 1 G/DL
ALBUMIN/GLOB SERPL: 1.1 G/DL
ALP SERPL-CCNC: 38 U/L (ref 39–117)
ALP SERPL-CCNC: 44 U/L (ref 39–117)
ALT SERPL W P-5'-P-CCNC: 15 U/L (ref 1–41)
ALT SERPL W P-5'-P-CCNC: 17 U/L (ref 1–41)
ANION GAP SERPL CALCULATED.3IONS-SCNC: 10 MMOL/L (ref 5–15)
ANION GAP SERPL CALCULATED.3IONS-SCNC: 11 MMOL/L (ref 5–15)
APTT PPP: 29.7 SECONDS (ref 24.1–35)
AST SERPL-CCNC: 19 U/L (ref 1–40)
AST SERPL-CCNC: 23 U/L (ref 1–40)
BACTERIA UR QL AUTO: ABNORMAL /HPF
BASOPHILS # BLD AUTO: 0.04 10*3/MM3 (ref 0–0.2)
BASOPHILS NFR BLD AUTO: 0.5 % (ref 0–1.5)
BILIRUB SERPL-MCNC: 0.3 MG/DL (ref 0–1.2)
BILIRUB SERPL-MCNC: 0.4 MG/DL (ref 0–1.2)
BILIRUB UR QL STRIP: NEGATIVE
BUN SERPL-MCNC: 24 MG/DL (ref 8–23)
BUN SERPL-MCNC: 26 MG/DL (ref 8–23)
BUN/CREAT SERPL: 14.5 (ref 7–25)
BUN/CREAT SERPL: 15.1 (ref 7–25)
CALCIUM SPEC-SCNC: 8.3 MG/DL (ref 8.6–10.5)
CALCIUM SPEC-SCNC: 9.3 MG/DL (ref 8.6–10.5)
CHLORIDE SERPL-SCNC: 104 MMOL/L (ref 98–107)
CHLORIDE SERPL-SCNC: 108 MMOL/L (ref 98–107)
CLARITY UR: CLEAR
CO2 SERPL-SCNC: 22 MMOL/L (ref 22–29)
CO2 SERPL-SCNC: 22 MMOL/L (ref 22–29)
COLOR UR: ABNORMAL
CREAT SERPL-MCNC: 1.59 MG/DL (ref 0.76–1.27)
CREAT SERPL-MCNC: 1.79 MG/DL (ref 0.76–1.27)
CRP SERPL-MCNC: 4.2 MG/DL (ref 0–0.5)
D-LACTATE SERPL-SCNC: 1.5 MMOL/L (ref 0.5–2)
D-LACTATE SERPL-SCNC: 2.7 MMOL/L (ref 0.5–2)
DEPRECATED RDW RBC AUTO: 47 FL (ref 37–54)
EGFRCR SERPLBLD CKD-EPI 2021: 35.8 ML/MIN/1.73
EGFRCR SERPLBLD CKD-EPI 2021: 41.2 ML/MIN/1.73
EOSINOPHIL # BLD AUTO: 0.23 10*3/MM3 (ref 0–0.4)
EOSINOPHIL NFR BLD AUTO: 3 % (ref 0.3–6.2)
ERYTHROCYTE [DISTWIDTH] IN BLOOD BY AUTOMATED COUNT: 14 % (ref 12.3–15.4)
GLOBULIN UR ELPH-MCNC: 3.1 GM/DL
GLOBULIN UR ELPH-MCNC: 3.5 GM/DL
GLUCOSE SERPL-MCNC: 102 MG/DL (ref 65–99)
GLUCOSE SERPL-MCNC: 137 MG/DL (ref 65–99)
GLUCOSE UR STRIP-MCNC: NEGATIVE MG/DL
HCT VFR BLD AUTO: 37.7 % (ref 37.5–51)
HGB BLD-MCNC: 11.8 G/DL (ref 13–17.7)
HGB UR QL STRIP.AUTO: NEGATIVE
HYALINE CASTS UR QL AUTO: ABNORMAL /LPF
IMM GRANULOCYTES # BLD AUTO: 0.03 10*3/MM3 (ref 0–0.05)
IMM GRANULOCYTES NFR BLD AUTO: 0.4 % (ref 0–0.5)
INR PPP: 1.05 (ref 0.91–1.09)
KETONES UR QL STRIP: ABNORMAL
LEUKOCYTE ESTERASE UR QL STRIP.AUTO: ABNORMAL
LIPASE SERPL-CCNC: 27 U/L (ref 13–60)
LYMPHOCYTES # BLD AUTO: 1.33 10*3/MM3 (ref 0.7–3.1)
LYMPHOCYTES NFR BLD AUTO: 17.1 % (ref 19.6–45.3)
MAGNESIUM SERPL-MCNC: 1.9 MG/DL (ref 1.6–2.4)
MCH RBC QN AUTO: 28.6 PG (ref 26.6–33)
MCHC RBC AUTO-ENTMCNC: 31.3 G/DL (ref 31.5–35.7)
MCV RBC AUTO: 91.5 FL (ref 79–97)
MONOCYTES # BLD AUTO: 1.14 10*3/MM3 (ref 0.1–0.9)
MONOCYTES NFR BLD AUTO: 14.6 % (ref 5–12)
NEUTROPHILS NFR BLD AUTO: 5.02 10*3/MM3 (ref 1.7–7)
NEUTROPHILS NFR BLD AUTO: 64.4 % (ref 42.7–76)
NITRITE UR QL STRIP: NEGATIVE
NRBC BLD AUTO-RTO: 0 /100 WBC (ref 0–0.2)
PH UR STRIP.AUTO: <=5 [PH] (ref 5–8)
PLATELET # BLD AUTO: 384 10*3/MM3 (ref 140–450)
PMV BLD AUTO: 9.6 FL (ref 6–12)
POTASSIUM SERPL-SCNC: 4.6 MMOL/L (ref 3.5–5.2)
POTASSIUM SERPL-SCNC: 4.8 MMOL/L (ref 3.5–5.2)
PROT SERPL-MCNC: 6.3 G/DL (ref 6–8.5)
PROT SERPL-MCNC: 7.3 G/DL (ref 6–8.5)
PROT UR QL STRIP: ABNORMAL
PROTHROMBIN TIME: 13.3 SECONDS (ref 11.9–14.6)
RBC # BLD AUTO: 4.12 10*6/MM3 (ref 4.14–5.8)
RBC # UR STRIP: ABNORMAL /HPF
REF LAB TEST METHOD: ABNORMAL
SODIUM SERPL-SCNC: 137 MMOL/L (ref 136–145)
SODIUM SERPL-SCNC: 140 MMOL/L (ref 136–145)
SP GR UR STRIP: 1.02 (ref 1–1.03)
SQUAMOUS #/AREA URNS HPF: ABNORMAL /HPF
UROBILINOGEN UR QL STRIP: ABNORMAL
WBC # UR STRIP: ABNORMAL /HPF
WBC NRBC COR # BLD: 7.79 10*3/MM3 (ref 3.4–10.8)

## 2022-03-04 PROCEDURE — 85730 THROMBOPLASTIN TIME PARTIAL: CPT | Performed by: FAMILY MEDICINE

## 2022-03-04 PROCEDURE — 25010000002 CEFTRIAXONE PER 250 MG: Performed by: FAMILY MEDICINE

## 2022-03-04 PROCEDURE — 80053 COMPREHEN METABOLIC PANEL: CPT | Performed by: FAMILY MEDICINE

## 2022-03-04 PROCEDURE — 71045 X-RAY EXAM CHEST 1 VIEW: CPT

## 2022-03-04 PROCEDURE — 86140 C-REACTIVE PROTEIN: CPT | Performed by: FAMILY MEDICINE

## 2022-03-04 PROCEDURE — P9612 CATHETERIZE FOR URINE SPEC: HCPCS

## 2022-03-04 PROCEDURE — 25010000002 IOPAMIDOL 61 % SOLUTION: Performed by: FAMILY MEDICINE

## 2022-03-04 PROCEDURE — 93005 ELECTROCARDIOGRAM TRACING: CPT | Performed by: FAMILY MEDICINE

## 2022-03-04 PROCEDURE — 74177 CT ABD & PELVIS W/CONTRAST: CPT

## 2022-03-04 PROCEDURE — 93010 ELECTROCARDIOGRAM REPORT: CPT | Performed by: INTERNAL MEDICINE

## 2022-03-04 PROCEDURE — 87086 URINE CULTURE/COLONY COUNT: CPT | Performed by: FAMILY MEDICINE

## 2022-03-04 PROCEDURE — 70450 CT HEAD/BRAIN W/O DYE: CPT

## 2022-03-04 PROCEDURE — 85610 PROTHROMBIN TIME: CPT | Performed by: FAMILY MEDICINE

## 2022-03-04 PROCEDURE — 81001 URINALYSIS AUTO W/SCOPE: CPT | Performed by: FAMILY MEDICINE

## 2022-03-04 PROCEDURE — 36415 COLL VENOUS BLD VENIPUNCTURE: CPT

## 2022-03-04 PROCEDURE — 96365 THER/PROPH/DIAG IV INF INIT: CPT

## 2022-03-04 PROCEDURE — 83735 ASSAY OF MAGNESIUM: CPT | Performed by: FAMILY MEDICINE

## 2022-03-04 PROCEDURE — 99283 EMERGENCY DEPT VISIT LOW MDM: CPT

## 2022-03-04 PROCEDURE — 85025 COMPLETE CBC W/AUTO DIFF WBC: CPT | Performed by: FAMILY MEDICINE

## 2022-03-04 PROCEDURE — 83690 ASSAY OF LIPASE: CPT | Performed by: FAMILY MEDICINE

## 2022-03-04 PROCEDURE — 96361 HYDRATE IV INFUSION ADD-ON: CPT

## 2022-03-04 PROCEDURE — 87040 BLOOD CULTURE FOR BACTERIA: CPT | Performed by: FAMILY MEDICINE

## 2022-03-04 PROCEDURE — 83605 ASSAY OF LACTIC ACID: CPT | Performed by: FAMILY MEDICINE

## 2022-03-04 RX ORDER — SODIUM CHLORIDE 9 MG/ML
125 INJECTION, SOLUTION INTRAVENOUS CONTINUOUS
Status: DISCONTINUED | OUTPATIENT
Start: 2022-03-04 | End: 2022-03-04 | Stop reason: HOSPADM

## 2022-03-04 RX ORDER — METRONIDAZOLE 500 MG/1
500 TABLET ORAL 3 TIMES DAILY
Qty: 30 TABLET | Refills: 0 | Status: SHIPPED | OUTPATIENT
Start: 2022-03-04

## 2022-03-04 RX ORDER — CIPROFLOXACIN 500 MG/1
500 TABLET, FILM COATED ORAL 2 TIMES DAILY
Qty: 20 TABLET | Refills: 0 | Status: SHIPPED | OUTPATIENT
Start: 2022-03-04

## 2022-03-04 RX ADMIN — SODIUM CHLORIDE 500 ML: 9 INJECTION, SOLUTION INTRAVENOUS at 14:46

## 2022-03-04 RX ADMIN — SODIUM CHLORIDE 1 G: 900 INJECTION INTRAVENOUS at 16:47

## 2022-03-04 RX ADMIN — SODIUM CHLORIDE 125 ML/HR: 9 INJECTION, SOLUTION INTRAVENOUS at 15:22

## 2022-03-04 RX ADMIN — IOPAMIDOL 100 ML: 612 INJECTION, SOLUTION INTRAVENOUS at 15:53

## 2022-03-04 RX ADMIN — SODIUM CHLORIDE 500 ML: 9 INJECTION, SOLUTION INTRAVENOUS at 17:38

## 2022-03-04 NOTE — ED NOTES
Pt unable to void. Post void done as general bladder scan with 0ml noted on 8 attempts with this RN and brodie Shetty Sherry, RN  03/04/22 8576

## 2022-03-04 NOTE — ED NOTES
Cathed with 50cc dark molina yellow urine obtained. U/A sent.      Hyacinth Leos, SILVA  03/04/22 7993

## 2022-03-05 LAB — BACTERIA SPEC AEROBE CULT: NO GROWTH

## 2022-03-05 NOTE — ED PROVIDER NOTES
Subjective   Mr. Cerrato is an 89-year-old gentleman who presents with complaints of dysuria and low blood pressure.  He also feels dizzy especially when he stands up.  He says that he has great difficulty urinating and can only urinate a little bit whenever he has a bowel movement.  He has had no diarrhea.  The patient has a low-grade fever reported from 2 days ago but none recently.  He had no nausea or vomiting.  He was trying to drink fluids aggressively this morning.          Review of Systems   Genitourinary: Positive for dysuria.   Neurological: Positive for dizziness and weakness.   All other systems reviewed and are negative.      Past Medical History:   Diagnosis Date   • Atherosclerosis    • BPH (benign prostatic hyperplasia)    • Carotid artery disease (HCC)    • High cholesterol    • Hypertension    • Kidney cysts    • Kidney stone    • Renal artery stenosis (HCC)    • Renal artery stenosis (HCC) 2018   • Renal insufficiency    • Solitary lung nodule    • Stroke (HCC)     Residual speech problems.       No Known Allergies    Past Surgical History:   Procedure Laterality Date   • APPENDECTOMY N/A 2022    Procedure: APPENDECTOMY LAPAROSCOPIC;  Surgeon: Milly Valverde MD;  Location: [facility];  Service: General;  Laterality: N/A;   • CAROTID ENDARTERECTOMY Right     Per Dr Idris Dotson   • PROSTATE SURGERY         Family History   Problem Relation Age of Onset   • Other Mother          after cranial surgery   • Cancer Father        Social History     Socioeconomic History   • Marital status:    Tobacco Use   • Smoking status: Former Smoker     Quit date: 1970     Years since quittin.2   • Smokeless tobacco: Never Used   Substance and Sexual Activity   • Alcohol use: No   • Drug use: No   • Sexual activity: Defer           Objective   Physical Exam  Vitals and nursing note reviewed.   Constitutional:       Appearance: He is well-developed.   HENT:      Head: Normocephalic and  atraumatic.      Right Ear: External ear normal.      Left Ear: External ear normal.      Nose: Nose normal.   Eyes:      Extraocular Movements: Extraocular movements intact.      Conjunctiva/sclera: Conjunctivae normal.   Cardiovascular:      Rate and Rhythm: Normal rate and regular rhythm.      Pulses: Normal pulses.      Heart sounds: Normal heart sounds.   Pulmonary:      Effort: Pulmonary effort is normal.      Breath sounds: Normal breath sounds.   Abdominal:      General: Bowel sounds are normal.      Palpations: Abdomen is soft.   Musculoskeletal:         General: Normal range of motion.      Cervical back: Normal range of motion and neck supple.   Skin:     General: Skin is warm and dry.      Capillary Refill: Capillary refill takes less than 2 seconds.   Neurological:      General: No focal deficit present.      Mental Status: He is alert and oriented to person, place, and time.   Psychiatric:         Behavior: Behavior normal.         Thought Content: Thought content normal.         Judgment: Judgment normal.         Procedures           ED Course                                                 MDM  Number of Diagnoses or Management Options     Amount and/or Complexity of Data Reviewed  Clinical lab tests: reviewed and ordered  Tests in the radiology section of CPT®: reviewed and ordered  Tests in the medicine section of CPT®: reviewed and ordered  Decide to obtain previous medical records or to obtain history from someone other than the patient: yes    Patient Progress  Patient progress: stable      Final diagnoses:   Urinary tract infection without hematuria, site unspecified   Proctocolitis       ED Disposition  ED Disposition     ED Disposition Condition Comment    Discharge Stable           Philippe Oliveira, [address]  [address]  [facility] [id number] [phone]    Call in 3 days           Medication List      New Prescriptions    ciprofloxacin 500 MG tablet  Commonly known as:  CIPRO  Take 1 tablet by mouth 2 (Two) Times a Day.     metroNIDAZOLE 500 MG tablet  Commonly known as: FLAGYL  Take 1 tablet by mouth 3 (Three) Times a Day.           Where to Get Your Medications      These medications were sent to [facility]/pharmacy #[id number] - [place], [place] - [address] - [phone]  - [phone] [address] [address]    Phone: [phone]   · ciprofloxacin 500 MG tablet  · metroNIDAZOLE 500 MG tablet       The patient's work-up did suggest urinary tract infection and a CT of his abdomen pelvis did suggest proctocolitis.  I discussed this with Dr. Millard and he said all that all other things being equal he was comfortable with the patient being discharged home with antibiotics to treat both.  I did discuss the possibility of admission with the patient and his family member but they are both keen to go home and feel much better after some IV fluids.  His repeat blood test actually showed significant improvement a lactate going from 2.72 normal and his creatinine improving on repeat labs.  Overall I am comfortable discharging the patient home but made it clear that they are to return to the ED if he gets worse in any way.  He is going to try and stay well-hydrated.     Robin Ford MD  03/04/22 4256

## 2022-03-05 NOTE — DISCHARGE INSTRUCTIONS
As we discussed, we will try and get you home but you must return to the ED if you get worse in any way.

## 2022-03-08 LAB
QT INTERVAL: 400 MS
QTC INTERVAL: 434 MS

## 2022-03-09 ENCOUNTER — READMISSION MANAGEMENT (OUTPATIENT)
Dept: CALL CENTER | Facility: HOSPITAL | Age: 87
End: 2022-03-09

## 2022-03-09 LAB
BACTERIA SPEC AEROBE CULT: NORMAL
BACTERIA SPEC AEROBE CULT: NORMAL

## 2022-03-09 NOTE — OUTREACH NOTE
"General Surgery Week 3 Survey    Flowsheet Row Responses   [facility] patient discharged from? [place]   Does the patient have one of the following disease processes/diagnoses(primary or secondary)? General Surgery   Week 3 attempt successful? Yes   Call start time 1304   Call end time 1306   Discharge diagnosis Acute appendicitis   Meds reviewed with patient/caregiver? Yes   Is the patient taking all medications as directed (includes completed medication regime)? Yes   Has the patient kept scheduled appointments due by today? Yes   What is the patient's perception of their health status since discharge? Improving   Is the patient/caregiver able to teach back signs and symptoms of incisional infection? Fever, Pus or odor from incision   Is the patient/caregiver able to teach back steps to recovery at home? Rest and rebuild strength, gradually increase activity, Eat a well-balance diet   Week 3 call completed? Yes   Revoked No further contact(revokes)-requires comment   Is the patient interested in additional calls from an ambulatory ?  NOTE:  applies to high risk patients requiring additional follow-up. No   Graduated/Revoked comments Pt stated, \"well, I don't know what you'd do for me\" when asked about a call next week          SHARON JENNINGS - Registered Nurse  "

## (undated) DEVICE — TBG PENCL TELESCP MEGADYNE SMOKE EVAC 10FT

## (undated) DEVICE — TRAP FLD MINIVAC MEGADYNE 100ML

## (undated) DEVICE — 3M™ STERI-STRIP™ REINFORCED ADHESIVE SKIN CLOSURES, R1547, 1/2 IN X 4 IN (12 MM X 100 MM), 6 STRIPS/ENVELOPE: Brand: 3M™ STERI-STRIP™

## (undated) DEVICE — GLV SURG BIOGEL M LTX PF 7 1/2

## (undated) DEVICE — PK TURNOVER RM ADV

## (undated) DEVICE — RESERVOIR,SUCTION,100CC,SILICONE: Brand: MEDLINE

## (undated) DEVICE — ENDOPATH PNEUMONEEDLE INSUFFLATION NEEDLES WITH LUER LOCK CONNECTORS 120MM: Brand: ENDOPATH

## (undated) DEVICE — APPL CHLORAPREP HI/LITE 26ML ORNG

## (undated) DEVICE — ENDOPOUCH RETRIEVER SPECIMEN RETRIEVAL BAGS: Brand: ENDOPOUCH RETRIEVER

## (undated) DEVICE — ANTIBACTERIAL UNDYED BRAIDED (POLYGLACTIN 910), SYNTHETIC ABSORBABLE SUTURE: Brand: COATED VICRYL

## (undated) DEVICE — CLTH CLENS READYCLEANSE PERI CARE PK/5

## (undated) DEVICE — SUT VIC 0 UR6 27IN VCP603H

## (undated) DEVICE — PAD LAP CHOLE: Brand: MEDLINE INDUSTRIES, INC.

## (undated) DEVICE — ENDOPATH XCEL WITH OPTIVIEW TECHNOLOGY UNIVERSAL TROCAR STABILITY SLEEVES: Brand: ENDOPATH XCEL OPTIVIEW

## (undated) DEVICE — MONOPOLAR METZENBAUM SCISSOR, MINI BLADE TIP, DISPOSABLE: Brand: MONOPOLAR METZENBAUM SCISSOR, MINI BLADE TIP, DISPOSABLE

## (undated) DEVICE — ENDOPATH XCEL DILATING TIP TROCARS WITH STABILITY SLEEVES: Brand: ENDOPATH XCEL

## (undated) DEVICE — KT CLN CLEANOR SCPE

## (undated) DEVICE — ENDOPATH ETS-FLEX45 ARTICULATING ENDOSCOPIC LINEAR CUTTER, NO RELOAD: Brand: ENDOPATH

## (undated) DEVICE — Device

## (undated) DEVICE — 2, DISPOSABLE SUCTION/IRRIGATOR WITHOUT DISPOSABLE TIP: Brand: STRYKEFLOW

## (undated) DEVICE — ENDOPATH XCEL WITH OPTIVIEW TECHNOLOGY DILATING TIP TROCARS WITH STABILITY SLEEVES: Brand: ENDOPATH XCEL OPTIVIEW

## (undated) DEVICE — TOTAL TRAY, 16FR 10ML SIL FOLEY, URN: Brand: MEDLINE